# Patient Record
Sex: FEMALE | Race: OTHER | HISPANIC OR LATINO | Employment: UNEMPLOYED | URBAN - METROPOLITAN AREA
[De-identification: names, ages, dates, MRNs, and addresses within clinical notes are randomized per-mention and may not be internally consistent; named-entity substitution may affect disease eponyms.]

---

## 2018-04-17 ENCOUNTER — HOSPITAL ENCOUNTER (EMERGENCY)
Facility: HOSPITAL | Age: 23
Discharge: HOME | End: 2018-04-17

## 2018-04-17 VITALS
WEIGHT: 160 LBS | HEART RATE: 96 BPM | RESPIRATION RATE: 18 BRPM | DIASTOLIC BLOOD PRESSURE: 98 MMHG | SYSTOLIC BLOOD PRESSURE: 178 MMHG | OXYGEN SATURATION: 100 % | TEMPERATURE: 98.6 F

## 2018-04-17 DIAGNOSIS — H10.33 ACUTE BACTERIAL CONJUNCTIVITIS OF BOTH EYES: Primary | ICD-10-CM

## 2018-04-17 PROCEDURE — 99282 EMERGENCY DEPT VISIT SF MDM: CPT

## 2018-04-17 RX ORDER — ERYTHROMYCIN 5 MG/G
OINTMENT OPHTHALMIC EVERY 6 HOURS SCHEDULED
Qty: 3.5 G | Refills: 0 | Status: SHIPPED | OUTPATIENT
Start: 2018-04-17 | End: 2018-04-24

## 2018-04-17 NOTE — DISCHARGE INSTRUCTIONS
Conjuntivitis   LO QUE USTED DEBE SABER:   Conjuntivitis, también llamado madhuri myers, es inflamación de jones conjuntiva  La conjuntiva es courtney membrana delgada que cubre la parte anterior de jones madhuri y el interior de joslyn párpados  La conjuntiva ayuda a mantener jones madhuri protegido y húmedo  INSTRUCCIONES:   Medicamentos:   · Medicamento contra alergia:  Gem medicamento ayuda a disminuir Jade Spaniel o inflamación ocular causado por alergias  [employer] puede ser administrado en forma de Page, gotas oculares o aerosol de nariz  · Antibióticos:  Si la causa de jones conjuntivitis courtney bacteria, usted puede requerir antibióticos  Puede recibir [employer] forma de píldora, gotas oculares o pomada para el madhuri  · Medicamento de esteroide:  Gem medicamento ayuda con la disminución de inflamación  Puede ser administrado en forma de píldora, gotas oculares o aerosol de nariz  · [place] joslyn medicamentos daniela se le haya indicado  Llame a jones proveedor de angeles si piensa que jones medicamento no le está ayudando o tiene efectos secundarios  Infórmele si es alérgico a algún medicamento  Mantenga courtney lista de joslyn medicamentos, vitaminas, y hierbas que está tomando  Incluya la cantidad que manjeet, la Martín galicia, y por qué las manjeet  Traiga la lista o las botellas de las píldoras a joslyn visitas de seguimiento  Lleve siempre consigo courtney lista de joslyn medicamentos en [place] de emergencia  Programe courtney emmanuel con jones médico de cabecera daniela indicado:  Puede ser necesario regresar para más exámenes oculares  Estos ayudarán a jones médico de cabecera determinar si existe daño ocular  Anote joslyn preguntas para acordarse de hacerlas shannan joslyn visitas  Evite la propagación de conjuntivitis:   · Lave joslyn bartolo frecuentemente:  Lave joslyn bartolo antes de tocar joslyn ojos   También, lave joslyn bartolo antes de que prepare o coma alimentos y después que utilice el baño o cambie un pañal     · Evite alergenos:  Trate de evitar las cosas que provocan joslyn alergias, tales daniela las [place], el polvo y la césped  · Evite contacto:  No comparta toallas o paños para lavarse  Trate de alejarse de otras personas lo más posible  Pregunte cuándo puede regresar a la escuela o al [place]  · Disponga de los cosméticos oculares:  Disponga del rímel u otros cosméticos del madhuri  Manejar joslyn síntomas:  · Aplique courtney compresa fría: Moje un paño para lavarse con agua tibia y colócalo en jones madhuri  Fate ayudará a reducir el hinchazón  · Utilice gotas oculares:  Gotas oculares o lágrimas artificiales son disponibles sin [place] Plump  [place] para mantener el madhuri húmedo  · No utilice lentes de contacto:  Pueden irritar jones madhuri  Disponga de joslyn lentes actuales y pregunte cuándo puede volver a utilizarlos nuevamente  Cuando jones médico lo indica, utilice un par de [employer]  · Shelly Gilding jones madhuri:  Puede ser necesario que lave jones madhuri con salino para disminuir joslyn síntomas  Pida más información sobre cómo limpiar jones madhuri  Comuníquese con jones médico de cabecera si:  · Jones vista se vuelve nublosa  · Usted tiene pequeñas protuberancias o manchas de caryl en jones madhuri  · Usted tiene preguntas o inquietudes sobre jones condición o cuidado  Regrese a la rosie de emergencia si:  · Empeora el hinchazón en jones madhuri aún después de jones tratamiento  · Jones visión empeora repentinamente o no puede janie nada en absoluto  · Jones madhuri comienza a sangrar  © 2014 [address] is for End User's use only and may not be sold, redistributed or otherwise used for commercial purposes  All illustrations and images included in CareNotes® are the copyrighted property of A D A M , Inc  or Jean Peterson  Esta información es sólo para uso en educación  Jones intención no es darle un consejo médico sobre enfermedades o tratamientos   Colsulte con jones Flako Yandel farmacéutico antes de seguir cualquier régimen médico para saber si es seguro y [place] para usted

## 2018-04-17 NOTE — ED PROVIDER NOTES
History  Chief Complaint   Patient presents with    Eye Redness     Patient reports bilateral eye redness and draiange that began two days ago  28-year-old female who presents for evaluation of eye redness x2 days  Symptoms started in the left eye and have now began to develop in the right eye  She describes eye redness purulent drainage and crusting of her eyes  She has not attempted any alleviating factors  She is here with her daughter with similar symptoms  She wears glasses sometimes but no contacts  She denies any trauma or injury to her eye  She denies any blurred vision double vision or vision loss  She has no other complaints at this time  She denies any fevers or chills, eye swelling, chest pain, shortness breath, abdominal pain, vomiting, URI symptoms  None       History reviewed  No pertinent past medical history  History reviewed  No pertinent surgical history  History reviewed  No pertinent family history  I have reviewed and agree with the history as documented  Social History   Substance Use Topics    Smoking status: Never Smoker    Smokeless tobacco: Never Used    Alcohol use No        Review of Systems   Constitutional: Negative for chills and fever  HENT: Negative for congestion, facial swelling, rhinorrhea and sore throat  Eyes: Positive for discharge and redness  Negative for photophobia, pain, itching and visual disturbance  Respiratory: Negative for cough and shortness of breath  Cardiovascular: Negative for chest pain  Gastrointestinal: Negative for abdominal pain, nausea and vomiting  Neurological: Negative for dizziness, light-headedness and headaches         Physical Exam  ED Triage Vitals [04/17/18 1251]   Temperature Pulse Respirations Blood Pressure SpO2   98 6 °F (37 °C) 96 18 (!) 178/98 100 %      Temp Source Heart Rate Source Patient Position - Orthostatic VS BP Location FiO2 (%)   Oral Monitor Standing Right arm --      Pain Score       4           Orthostatic Vital Signs  Vitals:    04/17/18 1251   BP: (!) 178/98   Pulse: 96   Patient Position - Orthostatic VS: Standing       Physical Exam   Constitutional: She is oriented to person, place, and time  She appears well-developed and well-nourished  Non-toxic appearance  She does not have a sickly appearance  She does not appear ill  No distress  Well appearing   HENT:   Head: Normocephalic and atraumatic  Right Ear: Hearing, tympanic membrane, external ear and ear canal normal    Left Ear: Hearing, tympanic membrane, external ear and ear canal normal    Nose: Nose normal  No mucosal edema or rhinorrhea  Mouth/Throat: Uvula is midline, oropharynx is clear and moist and mucous membranes are normal  No tonsillar exudate  Eyes: EOM and lids are normal  Pupils are equal, round, and reactive to light  Lids are everted and swept, no foreign bodies found  Right eye exhibits no chemosis, no discharge, no exudate and no hordeolum  No foreign body present in the right eye  Left eye exhibits discharge and exudate  Left eye exhibits no chemosis and no hordeolum  No foreign body present in the left eye  Right conjunctiva is injected  Left conjunctiva is injected  No scleral icterus  Right eye exhibits normal extraocular motion  Left eye exhibits normal extraocular motion  Bilateral conjunctival injection in the periphery, left greater than right  There is purulent drainage noted in the left eye  EOMs do not pain, no nystagmus  There is no periorbital erythema or edema  Neck: Normal range of motion  Neck supple  Cardiovascular: Normal rate, regular rhythm and normal heart sounds  Exam reveals no gallop and no friction rub  No murmur heard  Pulmonary/Chest: Effort normal and breath sounds normal  No respiratory distress  She has no decreased breath sounds  She has no wheezes  She has no rhonchi  She has no rales  Musculoskeletal: Normal range of motion     Neurological: She is alert and oriented to person, place, and time  Skin: Skin is warm and dry  Capillary refill takes less than 2 seconds  She is not diaphoretic  Psychiatric: She has a normal mood and affect  Nursing note and vitals reviewed  ED Medications  Medications - No data to display    Diagnostic Studies  Results Reviewed     None                 No orders to display              Procedures  Procedures       Phone Contacts  ED Phone Contact    ED Course  ED Course                                MDM  Number of Diagnoses or Management Options  Acute bacterial conjunctivitis of both eyes: new and does not require workup  Diagnosis management comments:   42-year-old female presents for evaluation of eye redness  Symptoms started in the left eye and spread to the right  She has no visual complaints or URI symptoms  Her daughter is also being evaluated for the same symptoms  She does not wear contacts  Will treat for conjunctivitis with erythromycin ointment  Patient instructed to follow up with [facility] and eye doctor if symptoms worsen  Return to ED precautions were given  Patient and family verbalized understanding and agreed with plan  Patient Progress  Patient progress: stable    CritCare Time    Disposition  Final diagnoses:   Acute bacterial conjunctivitis of both eyes     Time reflects when diagnosis was documented in both MDM as applicable and the Disposition within this note     Time User Action Codes Description Comment    4/17/2018  2:16 PM Stanton Cruz Add [H10 33] Acute bacterial conjunctivitis of both eyes       ED Disposition     ED Disposition Condition Comment    Discharge  Vandana Boyce discharge to home/self care      Condition at discharge: Good        Follow-up Information     Follow up With Specialties Details Why Contact Info Additional Colten Byers [facility] Schedule an appointment as soon as possible for a visit  [address] [facility]  [place] [address]  [address] [facility] Schedule an appointment as soon as possible for a visit If symptoms worsen [address] [facility]  [phone]       [facility] Emergency Department Emergency Medicine  If symptoms worsen [address] [facility] ED, [address] [facility] [place], [place], [address]        Discharge Medication List as of 4/17/2018  2:17 PM      START taking these medications    Details   erythromycin (ILOTYCIN) ophthalmic ointment Administer to both eyes every 6 (six) hours for 7 days Place a 1/2 inch ribbon of ointment into the lower eyelid  , Starting Tue 4/17/2018, Until Tue 4/24/2018, Print           No discharge procedures on file      ED Provider  Electronically Signed by           Herminio Sheehan PA-C  04/17/18 7336

## 2018-07-02 ENCOUNTER — APPOINTMENT (EMERGENCY)
Dept: ULTRASOUND IMAGING | Facility: HOSPITAL | Age: 23
End: 2018-07-02
Payer: COMMERCIAL

## 2018-07-02 ENCOUNTER — HOSPITAL ENCOUNTER (EMERGENCY)
Facility: HOSPITAL | Age: 23
Discharge: HOME | End: 2018-07-02
Attending: EMERGENCY MEDICINE | Admitting: EMERGENCY MEDICINE
Payer: COMMERCIAL

## 2018-07-02 VITALS
SYSTOLIC BLOOD PRESSURE: 112 MMHG | TEMPERATURE: 98 F | OXYGEN SATURATION: 100 % | DIASTOLIC BLOOD PRESSURE: 61 MMHG | HEART RATE: 68 BPM | RESPIRATION RATE: 16 BRPM | WEIGHT: 182.1 LBS

## 2018-07-02 DIAGNOSIS — O03.4 INCOMPLETE ABORTION: ICD-10-CM

## 2018-07-02 DIAGNOSIS — O46.90 VAGINAL BLEEDING IN PREGNANCY: Primary | ICD-10-CM

## 2018-07-02 LAB
ABO GROUP BLD: NORMAL
ALBUMIN SERPL BCP-MCNC: 3.9 G/DL (ref 3.5–5)
ALP SERPL-CCNC: 64 U/L (ref 46–116)
ALT SERPL W P-5'-P-CCNC: 18 U/L (ref 12–78)
AST SERPL W P-5'-P-CCNC: 14 U/L (ref 5–45)
ATRIAL RATE: 70 BPM
B-HCG SERPL-ACNC: 3218 MIU/ML
BASOPHILS # BLD AUTO: 0.03 THOUSANDS/ΜL (ref 0–0.1)
BASOPHILS NFR BLD AUTO: 0 % (ref 0–1)
BILIRUB DIRECT SERPL-MCNC: 0.1 MG/DL (ref 0–0.2)
BILIRUB SERPL-MCNC: 0.32 MG/DL (ref 0.2–1)
BLD GP AB SCN SERPL QL: NEGATIVE
EOSINOPHIL # BLD AUTO: 0.04 THOUSAND/ΜL (ref 0–0.61)
EOSINOPHIL NFR BLD AUTO: 0 % (ref 0–6)
ERYTHROCYTE [DISTWIDTH] IN BLOOD BY AUTOMATED COUNT: 13.8 % (ref 11.6–15.1)
GLUCOSE SERPL-MCNC: 160 MG/DL (ref 65–140)
HCT VFR BLD AUTO: 28.6 % (ref 34.8–46.1)
HCT VFR BLD AUTO: 35.6 % (ref 34.8–46.1)
HGB BLD-MCNC: 11.9 G/DL (ref 11.5–15.4)
HGB BLD-MCNC: 9.4 G/DL (ref 11.5–15.4)
LYMPHOCYTES # BLD AUTO: 2.27 THOUSANDS/ΜL (ref 0.6–4.47)
LYMPHOCYTES NFR BLD AUTO: 17 % (ref 14–44)
MCH RBC QN AUTO: 29.2 PG (ref 26.8–34.3)
MCHC RBC AUTO-ENTMCNC: 33.4 G/DL (ref 31.4–37.4)
MCV RBC AUTO: 87 FL (ref 82–98)
MONOCYTES # BLD AUTO: 0.5 THOUSAND/ΜL (ref 0.17–1.22)
MONOCYTES NFR BLD AUTO: 4 % (ref 4–12)
NEUTROPHILS # BLD AUTO: 10.5 THOUSANDS/ΜL (ref 1.85–7.62)
NEUTS SEG NFR BLD AUTO: 79 % (ref 43–75)
NRBC BLD AUTO-RTO: 0 /100 WBCS
P AXIS: 47 DEGREES
PLATELET # BLD AUTO: 292 THOUSANDS/UL (ref 149–390)
PMV BLD AUTO: 10.1 FL (ref 8.9–12.7)
PR INTERVAL: 146 MS
PROT SERPL-MCNC: 7.9 G/DL (ref 6.4–8.2)
QRS AXIS: 73 DEGREES
QRSD INTERVAL: 94 MS
QT INTERVAL: 380 MS
QTC INTERVAL: 410 MS
RBC # BLD AUTO: 4.08 MILLION/UL (ref 3.81–5.12)
RH BLD: POSITIVE
SPECIMEN EXPIRATION DATE: NORMAL
T WAVE AXIS: 44 DEGREES
VENTRICULAR RATE: 70 BPM
WBC # BLD AUTO: 13.34 THOUSAND/UL (ref 4.31–10.16)

## 2018-07-02 PROCEDURE — 96374 THER/PROPH/DIAG INJ IV PUSH: CPT

## 2018-07-02 PROCEDURE — 85014 HEMATOCRIT: CPT | Performed by: EMERGENCY MEDICINE

## 2018-07-02 PROCEDURE — 36415 COLL VENOUS BLD VENIPUNCTURE: CPT | Performed by: EMERGENCY MEDICINE

## 2018-07-02 PROCEDURE — 76815 OB US LIMITED FETUS(S): CPT

## 2018-07-02 PROCEDURE — 86901 BLOOD TYPING SEROLOGIC RH(D): CPT | Performed by: EMERGENCY MEDICINE

## 2018-07-02 PROCEDURE — 82948 REAGENT STRIP/BLOOD GLUCOSE: CPT

## 2018-07-02 PROCEDURE — 86900 BLOOD TYPING SEROLOGIC ABO: CPT | Performed by: EMERGENCY MEDICINE

## 2018-07-02 PROCEDURE — 80076 HEPATIC FUNCTION PANEL: CPT | Performed by: EMERGENCY MEDICINE

## 2018-07-02 PROCEDURE — 85025 COMPLETE CBC W/AUTO DIFF WBC: CPT | Performed by: EMERGENCY MEDICINE

## 2018-07-02 PROCEDURE — 93005 ELECTROCARDIOGRAM TRACING: CPT

## 2018-07-02 PROCEDURE — 86850 RBC ANTIBODY SCREEN: CPT | Performed by: EMERGENCY MEDICINE

## 2018-07-02 PROCEDURE — 84702 CHORIONIC GONADOTROPIN TEST: CPT | Performed by: EMERGENCY MEDICINE

## 2018-07-02 PROCEDURE — 99285 EMERGENCY DEPT VISIT HI MDM: CPT

## 2018-07-02 PROCEDURE — 96361 HYDRATE IV INFUSION ADD-ON: CPT

## 2018-07-02 PROCEDURE — 93010 ELECTROCARDIOGRAM REPORT: CPT | Performed by: INTERNAL MEDICINE

## 2018-07-02 PROCEDURE — 85018 HEMOGLOBIN: CPT | Performed by: EMERGENCY MEDICINE

## 2018-07-02 PROCEDURE — 96375 TX/PRO/DX INJ NEW DRUG ADDON: CPT

## 2018-07-02 RX ORDER — MISOPROSTOL 200 UG/1
800 TABLET ORAL ONCE
Status: COMPLETED | OUTPATIENT
Start: 2018-07-02 | End: 2018-07-02

## 2018-07-02 RX ORDER — IBUPROFEN 600 MG/1
600 TABLET ORAL EVERY 6 HOURS PRN
Qty: 30 TABLET | Refills: 0 | Status: SHIPPED | OUTPATIENT
Start: 2018-07-02 | End: 2019-01-02 | Stop reason: ALTCHOICE

## 2018-07-02 RX ORDER — FENTANYL CITRATE 50 UG/ML
50 INJECTION, SOLUTION INTRAMUSCULAR; INTRAVENOUS ONCE
Status: COMPLETED | OUTPATIENT
Start: 2018-07-02 | End: 2018-07-02

## 2018-07-02 RX ORDER — ONDANSETRON 2 MG/ML
4 INJECTION INTRAMUSCULAR; INTRAVENOUS ONCE
Status: COMPLETED | OUTPATIENT
Start: 2018-07-02 | End: 2018-07-02

## 2018-07-02 RX ADMIN — SODIUM CHLORIDE 1000 ML: 0.9 INJECTION, SOLUTION INTRAVENOUS at 01:24

## 2018-07-02 RX ADMIN — MISOPROSTOL 800 MCG: 200 TABLET ORAL at 04:19

## 2018-07-02 RX ADMIN — FENTANYL CITRATE 50 MCG: 50 INJECTION, SOLUTION INTRAMUSCULAR; INTRAVENOUS at 01:45

## 2018-07-02 RX ADMIN — ONDANSETRON 4 MG: 2 INJECTION INTRAMUSCULAR; INTRAVENOUS at 01:45

## 2018-07-02 NOTE — ED PROVIDER NOTES
History  Chief Complaint   Patient presents with    Vaginal Bleeding     Pt reports she has not had period for 2 months; No home HCG test done; Pt began bleeding tonight and reports large amount of blood; Pt's spouse reports Pt had 5 syncopal episodes tonight; Pt c/o generalized weakness    Weakness - Generalized     30 yo female who missed MP for past 2 weeks who presents after 5 syncopal episodes with vaginal bleeding  Per  and cousin pt started spotting yesterday and bleeding heavily a few hours PTA with large clots  Was in bathroom with  bleeding briskly for 1 hour before they brought her in for eval   Pt very pale and weak  Unable to stand to give urine sample  History provided by:  Patient, spouse and relative   used: No    Vaginal Bleeding   Quality:  Clots, dark red and heavier than menses  Severity:  Severe  Onset quality:  Gradual  Duration:  2 days  Timing:  Constant  Progression:  Worsening  Chronicity:  New  Menstrual history:  Missed period  Possible pregnancy: yes    Context: spontaneously    Relieved by:  Nothing  Worsened by:  Nothing  Ineffective treatments:  None tried  Associated symptoms: abdominal pain (suprapubic cramping), dizziness, fatigue (severe) and nausea    Associated symptoms: no back pain, no dysuria, no fever and no vaginal discharge    Associated symptoms comment:  Syncope x 5      None       History reviewed  No pertinent past medical history  History reviewed  No pertinent surgical history  History reviewed  No pertinent family history  I have reviewed and agree with the history as documented  Social History   Substance Use Topics    Smoking status: Never Smoker    Smokeless tobacco: Never Used    Alcohol use No        Review of Systems   Constitutional: Positive for fatigue (severe)  Negative for appetite change, chills and fever  HENT: Negative for congestion, sore throat and trouble swallowing      Eyes: Negative for visual disturbance  Respiratory: Negative for apnea and shortness of breath  Cardiovascular: Negative for chest pain  Gastrointestinal: Positive for abdominal pain (suprapubic cramping) and nausea  Negative for diarrhea and vomiting  Genitourinary: Positive for menstrual problem (missed x 2 mo) and vaginal bleeding  Negative for dysuria, frequency and vaginal discharge  Musculoskeletal: Negative for back pain, neck pain and neck stiffness  Skin: Negative for pallor and rash  Allergic/Immunologic: Negative for immunocompromised state  Neurological: Positive for dizziness, syncope and weakness  Negative for light-headedness and headaches  Psychiatric/Behavioral: Negative for confusion  All other systems reviewed and are negative  Physical Exam  Physical Exam   Constitutional: She is oriented to person, place, and time  She appears well-developed and well-nourished  She appears distressed (very weak)  HENT:   Head: Normocephalic and atraumatic  Mouth/Throat: Oropharynx is clear and moist    Eyes: EOM are normal  Pupils are equal, round, and reactive to light  Neck: Normal range of motion  Neck supple  Cardiovascular: Normal rate and regular rhythm  No murmur heard  Pulmonary/Chest: Effort normal and breath sounds normal  No respiratory distress  Abdominal: Soft  Bowel sounds are normal  There is tenderness (moderate RLQ tenderness and mild suprapubic tenderness )  Genitourinary:   Genitourinary Comments: Brisk bleeding with large clots, os not visualized   Musculoskeletal: Normal range of motion  Neurological: She is alert and oriented to person, place, and time  Generalized fatigue/weakness   Skin: Skin is warm  Capillary refill takes less than 2 seconds  No rash noted  There is pallor  Nursing note and vitals reviewed        Vital Signs  ED Triage Vitals   Temperature Pulse Respirations Blood Pressure SpO2   07/02/18 0114 07/02/18 0114 07/02/18 0114 07/02/18 0114 07/02/18 0114   98 °F (36 7 °C) 68 16 118/62 100 %      Temp Source Heart Rate Source Patient Position - Orthostatic VS BP Location FiO2 (%)   07/02/18 0114 07/02/18 0114 07/02/18 0130 07/02/18 0130 --   Oral Monitor Lying Left arm       Pain Score       07/02/18 0114       No Pain           Vitals:    07/02/18 0230 07/02/18 0245 07/02/18 0300 07/02/18 0433   BP: 114/65 107/56 105/58 112/61   Pulse: 72 64 64 68   Patient Position - Orthostatic VS: Lying Lying         Visual Acuity  Visual Acuity      Most Recent Value   L Pupil Size (mm)  3   R Pupil Size (mm)  3          ED Medications  Medications   sodium chloride 0 9 % bolus 1,000 mL (0 mL Intravenous Stopped 7/2/18 0218)   fentanyl citrate (PF) 100 MCG/2ML 50 mcg (50 mcg Intravenous Given 7/2/18 0145)   ondansetron (ZOFRAN) injection 4 mg (4 mg Intravenous Given 7/2/18 0145)   misoprostol (CYTOTEC) tablet 800 mcg (800 mcg Oral Given by Other 7/2/18 0419)       Diagnostic Studies  Results Reviewed     Procedure Component Value Units Date/Time    Hemoglobin and hematocrit, blood [[id number]]  (Abnormal) Collected:  07/02/18 0313    Lab Status:  Final result Specimen:  Blood from Arm, Right Updated:  07/02/18 0319     Hemoglobin 9 4 (L) g/dL      Hematocrit 28 6 (L) %     Quantitative hCG [[id number]]  (Abnormal) Collected:  07/02/18 0124    Lab Status:  Final result Specimen:  Blood from Arm, Right Updated:  07/02/18 0216     HCG, Quant 3,218 0 (H) mIU/mL     Narrative:          Expected Ranges:     Approximate               Approximate HCG  Gestation age          Concentration ( mIU/mL)  _____________          ______________________   Edwena Nabeel                      HCG values  0 2-1                       5-50  1-2                           2-3                         100-5000  3-4                         500-46686  4-5                         1000-48003  5-6                         [id number]  6-8                         10605-536300  8-12 [id number]    Hepatic function panel [[id number]]  (Normal) Collected:  18    Lab Status:  Final result Specimen:  Blood from Arm, Right Updated:  18 0216     Total Bilirubin 0 32 mg/dL      Bilirubin, Direct 0 10 mg/dL      Alkaline Phosphatase 64 U/L      AST 14 U/L      ALT 18 U/L      Total Protein 7 9 g/dL      Albumin 3 9 g/dL     CBC and differential [[id number]]  (Abnormal) Collected:  18    Lab Status:  Final result Specimen:  Blood from Arm, Right Updated:  18 013     WBC 13 34 (H) Thousand/uL      RBC 4 08 Million/uL      Hemoglobin 11 9 g/dL      Hematocrit 35 6 %      MCV 87 fL      MCH 29 2 pg      MCHC 33 4 g/dL      RDW 13 8 %      MPV 10 1 fL      Platelets 473 Thousands/uL      nRBC 0 /100 WBCs      Neutrophils Relative 79 (H) %      Lymphocytes Relative 17 %      Monocytes Relative 4 %      Eosinophils Relative 0 %      Basophils Relative 0 %      Neutrophils Absolute 10 50 (H) Thousands/µL      Lymphocytes Absolute 2 27 Thousands/µL      Monocytes Absolute 0 50 Thousand/µL      Eosinophils Absolute 0 04 Thousand/µL      Basophils Absolute 0 03 Thousands/µL     POCT urinalysis dipstick [[id number]]     Lab Status:  No result Specimen:  Urine     POCT pregnancy, urine [[id number]]     Lab Status:  No result     Fingerstick Glucose (POCT) [[id number]]  (Abnormal) Collected:  18 011    Lab Status:  Final result Updated:  18     POC Glucose 160 (H) mg/dl                  US OB pregnancy limited with transvaginal   Final Result by Asiya Hodges MD ([id number])      No IUP  Ectopic pregnancy cannot be excluded  Recommend serial serum beta hCG and/or ultrasound for further evaluation  Based on the clinical history and the appearance of the endometrium which is heterogeneous and extends into the lower uterine segment, I suspect this patient has a missed / in progress               I personally discussed this study with Shaw Branch RN on 7/2/2018 at 3:56 AM                Workstation performed: [id number]                    Procedures  ECG 12 Lead Documentation  Date/Time: 7/2/2018 1:22 AM  Performed by: Jigar Mathur  Authorized by: Jigar Mathur     Indications / Diagnosis:  Syncope  Patient location:  ED  Previous ECG:     Previous ECG:  Unavailable  Interpretation:     Interpretation: normal    Rate:     ECG rate:  70    ECG rate assessment: normal    Rhythm:     Rhythm: sinus rhythm    Ectopy:     Ectopy: none    QRS:     QRS axis:  Normal    QRS intervals:  Normal  Conduction:     Conduction: normal    ST segments:     ST segments:  Normal  T waves:     T waves: normal             Phone Contacts  ED Phone Contact    ED Course  ED Course as of Jul 02 0506   Mon Jul 02, 2018   0113 Pt seen and examined  30 yo female who missed MP for past 2 weeks who presents after 5 syncopal episodes with vaginal bleeding  Per  and cousin pt started spotting yesterday and bleeding heavily a few hours PTA with large clots  Was in bathroom with  bleeding briskly for 1 hour before they brought her in for eval   Pt very pale and weak  Unable to stand to give urine sample  Will check labs including T&S and beta HCG  Will give IVF and check pelvic exam     0138 Pelvic exam performed with Krystal (MELODY) at bedside - large amount of clots with brisk bleeding, os unable to be visualized  Hgb 11 9  IVF, fentanyl and zofran ordered  Will d/w OBGYN      5736 OBGYN paged as per lab quant is being autodiluted and expected to be "very high"  5700 [facility] coming for eval   B HCG 3218     0228 OBGYN down seeing pt - agrees syncope likely from vasovagal response  US r/o ectopic ordered - [employer] calling them in  Will repeat H/H at 2 hour jennifer ~ 0330     0239 OBGYN feels pt likely needs D&E - os open and passing placental tissue        0410 US - No IUP   Ectopic pregnancy cannot be excluded   Recommend serial serum beta hCG and/or ultrasound for further evaluation  Based on the clinical history and the appearance of the endometrium which is heterogeneous and extends into the lower uterine segment, I suspect this patient has a missed / in progress  Repeat Hgb 9 4 - OBGYN aware and down to see pt, thinking best option would be cytotec  MDM  CritCare Time    Disposition  Final diagnoses:   Vaginal bleeding in pregnancy   Incomplete      Time reflects when diagnosis was documented in both MDM as applicable and the Disposition within this note     Time User Action Codes Description Comment    2018  2:20 AM Mercy SCHMITT Add [O46 90] Vaginal bleeding in pregnancy     2018  4:32 AM Socrates Reynolds Add [O03 4] Incomplete        ED Disposition     ED Disposition Condition Comment    Discharge  [facility] discharge to home/self care  Condition at discharge: Good        Follow-up Information     Follow up With Specialties Details Why [facility] Obstetrics and Gynecology Call Please call to schedule follow-up appointment  Brandon Rouse [address]  [phone]          Discharge Medication List as of 2018  4:25 AM      START taking these medications    Details   ibuprofen (MOTRIN) 600 mg tablet Take 1 tablet (600 mg total) by mouth every 6 (six) hours as needed for mild pain or moderate pain, Starting Mon 2018, Print             Outpatient Discharge Orders  hCG, quantitative   Standing Status: Future  Standing Exp   Date: 18         ED Provider  Electronically Signed by           Timoteo Perez DO  18 [id number]

## 2018-07-02 NOTE — ED NOTES
Provider at bedside requesting pelvic setup at this time due to family reports of "alot" of heavy bleeding vaginally        Herbert Gamble RN  07/02/18 5001

## 2018-07-02 NOTE — CONSULTS
Consultation - Gynecology   Sam Reynolds 29 y o  female MRN: [id number]  Unit/Bed#: ED 30 Encounter: [id number]    Chief Complaint   Patient presents with    Vaginal Bleeding     Pt reports she has not had period for 2 months; No home HCG test done; Pt began bleeding tonight and reports large amount of blood; Pt's spouse reports Pt had 5 syncopal episodes tonight; Pt c/o generalized weakness    Weakness - Generalized       History of Present Illness   Physician Requesting Consult: Johan Infante DO  Reason for Consult / Principal Problem: Vaginal Bleeding  Subspeciality: General GYN  HPI: Sam Reynolds is a 29y o  year old female who presents with vaginal bleeding     29 y o  Carmenza Marion, LMP unknown believes in to be in early April, using condoms intermittently for contraception, presents for evaluation of vaginal bleeding  Reports bleeding onset yesterday with spotting, progressing to heavy vaginal bleeding throughout the evening, passing large clots  Per  and cousins reports syncopal episodes when passing clots prior to ED presentation  Also reporting associated cramping and nausea  At time of initial ED presentation, patient did not know she was pregnancy, not completing a home pregnancy test in the setting of missed menstrual periods  Reports regular menses prior to 2018  Hx previous   Inpatient consult to Obstetrics / Gynecology  Consult performed by: Emanuel Sims ordered by: Lashawn Bronson          Review of Systems   Constitutional: Positive for chills and fatigue  Negative for fever  Respiratory: Negative for chest tightness and shortness of breath  Cardiovascular: Negative for chest pain, palpitations and leg swelling  Gastrointestinal: Positive for abdominal pain and nausea  Negative for constipation, diarrhea and vomiting  Genitourinary: Positive for menstrual problem and vaginal bleeding   Negative for dysuria, hematuria, vaginal discharge and vaginal pain  Musculoskeletal: Negative for arthralgias and myalgias  Neurological: Positive for weakness and light-headedness  Negative for dizziness  Historical Information   History reviewed  No pertinent past medical history  History reviewed  No pertinent surgical history  OB History   No data available     History reviewed  No pertinent family history  Social History   History   Alcohol Use No     History   Drug Use No     History   Smoking Status    Never Smoker   Smokeless Tobacco    Never Used       Meds/Allergies   Current Facility-Administered Medications   Medication Dose Route Frequency    misoprostol (CYTOTEC) tablet 800 mcg  800 mcg Oral Once         No Known Allergies    Objective   Vitals: Blood pressure 105/58, pulse 64, temperature 98 °F (36 7 °C), temperature source Oral, resp  rate 14, weight 82 6 kg (182 lb 1 6 oz), last menstrual period 07/01/2018, SpO2 100 %  There is no height or weight on file to calculate BMI  Intake/Output Summary (Last 24 hours) at 07/02/18 0411  Last data filed at 07/02/18 0218   Gross per 24 hour   Intake             1000 ml   Output                0 ml   Net             1000 ml       Invasive Devices     Peripheral Intravenous Line            Peripheral IV 07/02/18 Right Antecubital less than 1 day                Physical Exam   Constitutional: She is oriented to person, place, and time  She appears well-developed and well-nourished  No distress  Cardiovascular: Normal rate, regular rhythm and normal heart sounds  Pulmonary/Chest: Effort normal and breath sounds normal    Abdominal: Soft  Bowel sounds are normal  There is tenderness  There is no rebound and no guarding  Diffuse lower abdominal tenderness, mild    Genitourinary: There is no rash, tenderness or lesion on the right labia  There is no rash, tenderness or lesion on the left labia  There is tenderness and bleeding in the vagina  No foreign body in the vagina   No signs of injury around the vagina  No vaginal discharge found  Genitourinary Comments: Moderate vaginal bleeding + clots, approximately 100 cc evacuated during speculum exam; cervical os visually open passing products of conception    Neurological: She is alert and oriented to person, place, and time  Lab Results:   Admission on 07/02/2018   Component Date Value    POC Glucose 07/02/2018 160*    HCG, Quant 07/02/2018 3218 0*    Total Bilirubin 07/02/2018 0 32     Bilirubin, Direct 07/02/2018 0 10     Alkaline Phosphatase 07/02/2018 64     AST 07/02/2018 14     ALT 07/02/2018 18     Total Protein 07/02/2018 7 9     Albumin 07/02/2018 3 9     WBC 07/02/2018 13 34*    RBC 07/02/2018 4 08     Hemoglobin 07/02/2018 11 9     Hematocrit 07/02/2018 35 6     MCV 07/02/2018 87     MCH 07/02/2018 29 2     MCHC 07/02/2018 33 4     RDW 07/02/2018 13 8     MPV 07/02/2018 10 1     Platelets 86/49/1266 292     nRBC 07/02/2018 0     Neutrophils Relative 07/02/2018 79*    Lymphocytes Relative 07/02/2018 17     Monocytes Relative 07/02/2018 4     Eosinophils Relative 07/02/2018 0     Basophils Relative 07/02/2018 0     Neutrophils Absolute 07/02/2018 10 50*    Lymphocytes Absolute 07/02/2018 2 27     Monocytes Absolute 07/02/2018 0 50     Eosinophils Absolute 07/02/2018 0 04     Basophils Absolute 07/02/2018 0 03     Hemoglobin 07/02/2018 9 4*    Hematocrit 07/02/2018 28 6*    ABO Grouping 07/02/2018 A     Rh Factor 07/02/2018 Positive     Antibody Screen 07/02/2018 Negative     Specimen Expiration Date 07/02/2018 [id number]      Imaging Studies: I have personally reviewed pertinent reports  EKG, Pathology, and Other Studies: I have personally reviewed pertinent reports  PELVIC ULTRASOUND     INDICATION: RLQ abd pain, vag bleeding r/o ectopic      COMPARISON: None      TECHNIQUE:   Transabdominal ultrasound of the pelvis was performed    Additional transvaginal imaging was then performed to better assess myometrial/endometrial architecture and ovarian parenchymal detail  The study includes volumetric sweeps and   traditional still imaging technique        FINDINGS:     No IUP  Ectopic pregnancy cannot be excluded  Recommend serial serum beta hCG and/or ultrasound for further evaluation      UTERUS/ADNEXA:   The uterus and ovaries are within normal limits  Endometrial stripe measures 13 mm and is heterogeneous extending into the lower uterine segment  The cervix remains closed  No free fluid present      IMPRESSION:     No IUP  Ectopic pregnancy cannot be excluded  Recommend serial serum beta hCG and/or ultrasound for further evaluation      Based on the clinical history and the appearance of the endometrium which is heterogeneous and extends into the lower uterine segment, I suspect this patient has a missed / in progress  Assessment/Plan     Assessment:  29year old now  with incomplete , hemodynamically stable  Plan:  Counseled patient on current presentation of miscarriage/incomplete  in the setting of elevated bHCG and brisk vaginal bleeding  Discussed appropriate fall in hemoglobin, given current symptoms and clinical presentation no current need for blood transfusion  Discussed management options regarding incomplete , including expected management vs  Medical management vs  Surgical management  Patient opts for medical management  Counseled patient on continue cramping and bleeding with use of cytotec  Cytotec 800 mcg placed vaginally prior to discharge  Rx motrin 600 mg PO q6h for cramping also provided prior to discharge  Encouraged follow-up at the [facility] in 1 week, along with repeat BHCG for one week  To follow-up in the ED with worsening vaginal bleeding, fevers, chills  Patient agreeable with plan         Code Status: No Order  Advance Directive and Living Will:      Power of : POLST:      Counseling / Coordination of Care  Total floor / unit time spent today30 minutes  minutes  Greater than 50% of total time was spent with the patient and / or family counseling and / or coordination of care      DO Elda

## 2018-07-02 NOTE — ED NOTES
Patient resting in bed comfortably refusing to get into gown  Patient responds to name but not opening eyes   Patient asked why her eyes are closed and reports "Im tired "     Marianne Dwyer RN  07/02/18 0545

## 2019-01-02 ENCOUNTER — HOSPITAL ENCOUNTER (EMERGENCY)
Facility: HOSPITAL | Age: 24
Discharge: HOME | End: 2019-01-02
Attending: EMERGENCY MEDICINE
Payer: COMMERCIAL

## 2019-01-02 VITALS
HEART RATE: 71 BPM | OXYGEN SATURATION: 100 % | SYSTOLIC BLOOD PRESSURE: 113 MMHG | TEMPERATURE: 98.4 F | DIASTOLIC BLOOD PRESSURE: 69 MMHG | RESPIRATION RATE: 18 BRPM

## 2019-01-02 DIAGNOSIS — D64.9 ANEMIA: ICD-10-CM

## 2019-01-02 DIAGNOSIS — Z3A.14 14 WEEKS GESTATION OF PREGNANCY: ICD-10-CM

## 2019-01-02 DIAGNOSIS — O20.0 THREATENED ABORTION: Primary | ICD-10-CM

## 2019-01-02 DIAGNOSIS — N93.9 VAGINAL BLEEDING: ICD-10-CM

## 2019-01-02 LAB
ALBUMIN SERPL BCP-MCNC: 2.7 G/DL (ref 3.5–5)
ALP SERPL-CCNC: 41 U/L (ref 46–116)
ALT SERPL W P-5'-P-CCNC: 11 U/L (ref 12–78)
ANION GAP SERPL CALCULATED.3IONS-SCNC: 8 MMOL/L (ref 4–13)
AST SERPL W P-5'-P-CCNC: 12 U/L (ref 5–45)
B-HCG SERPL-ACNC: ABNORMAL MIU/ML
BACTERIA UR QL AUTO: ABNORMAL /HPF
BASOPHILS # BLD AUTO: 0.03 THOUSANDS/ΜL (ref 0–0.1)
BASOPHILS NFR BLD AUTO: 0 % (ref 0–1)
BILIRUB SERPL-MCNC: 0.15 MG/DL (ref 0.2–1)
BILIRUB UR QL STRIP: NEGATIVE
BUN SERPL-MCNC: 11 MG/DL (ref 5–25)
CALCIUM SERPL-MCNC: 7.8 MG/DL (ref 8.3–10.1)
CHLORIDE SERPL-SCNC: 104 MMOL/L (ref 100–108)
CLARITY UR: CLEAR
CO2 SERPL-SCNC: 26 MMOL/L (ref 21–32)
COLOR UR: YELLOW
CREAT SERPL-MCNC: 0.92 MG/DL (ref 0.6–1.3)
EOSINOPHIL # BLD AUTO: 0.09 THOUSAND/ΜL (ref 0–0.61)
EOSINOPHIL NFR BLD AUTO: 1 % (ref 0–6)
ERYTHROCYTE [DISTWIDTH] IN BLOOD BY AUTOMATED COUNT: 16.7 % (ref 11.6–15.1)
EXT PREG TEST URINE: ABNORMAL
GFR SERPL CREATININE-BSD FRML MDRD: 89 ML/MIN/1.73SQ M
GLUCOSE SERPL-MCNC: 87 MG/DL (ref 65–140)
GLUCOSE UR STRIP-MCNC: NEGATIVE MG/DL
HCT VFR BLD AUTO: 31.2 % (ref 34.8–46.1)
HGB BLD-MCNC: 9.6 G/DL (ref 11.5–15.4)
HGB UR QL STRIP.AUTO: ABNORMAL
IMM GRANULOCYTES # BLD AUTO: 0.03 THOUSAND/UL (ref 0–0.2)
IMM GRANULOCYTES NFR BLD AUTO: 0 % (ref 0–2)
KETONES UR STRIP-MCNC: NEGATIVE MG/DL
LEUKOCYTE ESTERASE UR QL STRIP: ABNORMAL
LYMPHOCYTES # BLD AUTO: 2.07 THOUSANDS/ΜL (ref 0.6–4.47)
LYMPHOCYTES NFR BLD AUTO: 24 % (ref 14–44)
MCH RBC QN AUTO: 24.7 PG (ref 26.8–34.3)
MCHC RBC AUTO-ENTMCNC: 30.8 G/DL (ref 31.4–37.4)
MCV RBC AUTO: 80 FL (ref 82–98)
MONOCYTES # BLD AUTO: 0.56 THOUSAND/ΜL (ref 0.17–1.22)
MONOCYTES NFR BLD AUTO: 6 % (ref 4–12)
NEUTROPHILS # BLD AUTO: 5.96 THOUSANDS/ΜL (ref 1.85–7.62)
NEUTS SEG NFR BLD AUTO: 69 % (ref 43–75)
NITRITE UR QL STRIP: NEGATIVE
NON-SQ EPI CELLS URNS QL MICRO: ABNORMAL /HPF
NRBC BLD AUTO-RTO: 0 /100 WBCS
PH UR STRIP.AUTO: 7 [PH] (ref 4.5–8)
PLATELET # BLD AUTO: 293 THOUSANDS/UL (ref 149–390)
PMV BLD AUTO: 10.1 FL (ref 8.9–12.7)
POTASSIUM SERPL-SCNC: 3.6 MMOL/L (ref 3.5–5.3)
PROT SERPL-MCNC: 7 G/DL (ref 6.4–8.2)
PROT UR STRIP-MCNC: NEGATIVE MG/DL
RBC # BLD AUTO: 3.88 MILLION/UL (ref 3.81–5.12)
RBC #/AREA URNS AUTO: ABNORMAL /HPF
SODIUM SERPL-SCNC: 138 MMOL/L (ref 136–145)
SP GR UR STRIP.AUTO: 1.02 (ref 1–1.03)
UROBILINOGEN UR QL STRIP.AUTO: 0.2 E.U./DL
WBC # BLD AUTO: 8.74 THOUSAND/UL (ref 4.31–10.16)
WBC #/AREA URNS AUTO: ABNORMAL /HPF

## 2019-01-02 PROCEDURE — 87491 CHLMYD TRACH DNA AMP PROBE: CPT | Performed by: NURSE PRACTITIONER

## 2019-01-02 PROCEDURE — 81001 URINALYSIS AUTO W/SCOPE: CPT

## 2019-01-02 PROCEDURE — 36415 COLL VENOUS BLD VENIPUNCTURE: CPT | Performed by: NURSE PRACTITIONER

## 2019-01-02 PROCEDURE — 80053 COMPREHEN METABOLIC PANEL: CPT | Performed by: NURSE PRACTITIONER

## 2019-01-02 PROCEDURE — 85025 COMPLETE CBC W/AUTO DIFF WBC: CPT | Performed by: NURSE PRACTITIONER

## 2019-01-02 PROCEDURE — 87086 URINE CULTURE/COLONY COUNT: CPT

## 2019-01-02 PROCEDURE — 84702 CHORIONIC GONADOTROPIN TEST: CPT | Performed by: NURSE PRACTITIONER

## 2019-01-02 PROCEDURE — 81025 URINE PREGNANCY TEST: CPT | Performed by: EMERGENCY MEDICINE

## 2019-01-02 PROCEDURE — 87591 N.GONORRHOEAE DNA AMP PROB: CPT | Performed by: NURSE PRACTITIONER

## 2019-01-02 PROCEDURE — 99284 EMERGENCY DEPT VISIT MOD MDM: CPT

## 2019-01-02 RX ORDER — PNV NO.95/FERROUS FUM/FOLIC AC 28MG-0.8MG
1 TABLET ORAL DAILY
Qty: 30 TABLET | Refills: 0 | Status: SHIPPED | OUTPATIENT
Start: 2019-01-02 | End: 2019-02-05 | Stop reason: SDUPTHER

## 2019-01-02 NOTE — ED NOTES
Pt ambulatory to the bathroom with steady gait at this time        Johanna Nichols RN  01/02/19 9750

## 2019-01-02 NOTE — ED NOTES
Pt states she is 14 weeks pregnant and has been cramping and bleeding for two weeks  Pt reports changing her panty liner on some day about 2-3x a day  Pt reports increased bleeding today  Pt denies and N/V/D        Alhaji Alatorre RN  01/02/19 6366

## 2019-01-03 ENCOUNTER — HOSPITAL ENCOUNTER (EMERGENCY)
Facility: HOSPITAL | Age: 24
Discharge: HOME | End: 2019-01-03
Attending: EMERGENCY MEDICINE
Payer: COMMERCIAL

## 2019-01-03 ENCOUNTER — TELEPHONE (OUTPATIENT)
Dept: OBGYN CLINIC | Facility: CLINIC | Age: 24
End: 2019-01-03

## 2019-01-03 VITALS
OXYGEN SATURATION: 98 % | TEMPERATURE: 97.8 F | DIASTOLIC BLOOD PRESSURE: 62 MMHG | RESPIRATION RATE: 16 BRPM | SYSTOLIC BLOOD PRESSURE: 116 MMHG | WEIGHT: 153 LBS | HEART RATE: 80 BPM

## 2019-01-03 DIAGNOSIS — O46.90 VAGINAL BLEEDING IN PREGNANCY: Primary | ICD-10-CM

## 2019-01-03 DIAGNOSIS — O46.92 SECOND TRIMESTER BLEEDING: ICD-10-CM

## 2019-01-03 LAB
BACTERIA UR QL AUTO: ABNORMAL /HPF
BASOPHILS # BLD AUTO: 0.04 THOUSANDS/ΜL (ref 0–0.1)
BASOPHILS NFR BLD AUTO: 0 % (ref 0–1)
BILIRUB UR QL STRIP: NEGATIVE
CLARITY UR: CLEAR
COLOR UR: YELLOW
EOSINOPHIL # BLD AUTO: 0.11 THOUSAND/ΜL (ref 0–0.61)
EOSINOPHIL NFR BLD AUTO: 1 % (ref 0–6)
ERYTHROCYTE [DISTWIDTH] IN BLOOD BY AUTOMATED COUNT: 17 % (ref 11.6–15.1)
GLUCOSE UR STRIP-MCNC: NEGATIVE MG/DL
HCT VFR BLD AUTO: 34.5 % (ref 34.8–46.1)
HGB BLD-MCNC: 10.4 G/DL (ref 11.5–15.4)
HGB UR QL STRIP.AUTO: ABNORMAL
IMM GRANULOCYTES # BLD AUTO: 0.05 THOUSAND/UL (ref 0–0.2)
IMM GRANULOCYTES NFR BLD AUTO: 1 % (ref 0–2)
KETONES UR STRIP-MCNC: NEGATIVE MG/DL
LEUKOCYTE ESTERASE UR QL STRIP: ABNORMAL
LYMPHOCYTES # BLD AUTO: 2.04 THOUSANDS/ΜL (ref 0.6–4.47)
LYMPHOCYTES NFR BLD AUTO: 21 % (ref 14–44)
MCH RBC QN AUTO: 24.4 PG (ref 26.8–34.3)
MCHC RBC AUTO-ENTMCNC: 30.1 G/DL (ref 31.4–37.4)
MCV RBC AUTO: 81 FL (ref 82–98)
MONOCYTES # BLD AUTO: 0.62 THOUSAND/ΜL (ref 0.17–1.22)
MONOCYTES NFR BLD AUTO: 6 % (ref 4–12)
NEUTROPHILS # BLD AUTO: 7.03 THOUSANDS/ΜL (ref 1.85–7.62)
NEUTS SEG NFR BLD AUTO: 71 % (ref 43–75)
NITRITE UR QL STRIP: NEGATIVE
NON-SQ EPI CELLS URNS QL MICRO: ABNORMAL /HPF
NRBC BLD AUTO-RTO: 0 /100 WBCS
PH UR STRIP.AUTO: 5.5 [PH] (ref 4.5–8)
PLATELET # BLD AUTO: 334 THOUSANDS/UL (ref 149–390)
PMV BLD AUTO: 10.2 FL (ref 8.9–12.7)
PROT UR STRIP-MCNC: NEGATIVE MG/DL
RBC # BLD AUTO: 4.26 MILLION/UL (ref 3.81–5.12)
RBC #/AREA URNS AUTO: ABNORMAL /HPF
SP GR UR STRIP.AUTO: >=1.03 (ref 1–1.03)
UROBILINOGEN UR QL STRIP.AUTO: 0.2 E.U./DL
WBC # BLD AUTO: 9.89 THOUSAND/UL (ref 4.31–10.16)
WBC #/AREA URNS AUTO: ABNORMAL /HPF

## 2019-01-03 PROCEDURE — 87086 URINE CULTURE/COLONY COUNT: CPT

## 2019-01-03 PROCEDURE — 99284 EMERGENCY DEPT VISIT MOD MDM: CPT

## 2019-01-03 PROCEDURE — 81001 URINALYSIS AUTO W/SCOPE: CPT

## 2019-01-03 PROCEDURE — 36415 COLL VENOUS BLD VENIPUNCTURE: CPT | Performed by: EMERGENCY MEDICINE

## 2019-01-03 PROCEDURE — 85025 COMPLETE CBC W/AUTO DIFF WBC: CPT | Performed by: EMERGENCY MEDICINE

## 2019-01-03 NOTE — CONSULTS
Consultation - Obstetrics & Gynecology   Samosn Herndon 26 y o  female MRN: [id number]  Unit/Bed#: ED 23 Encounter: [id number]    Chief Complaint   Patient presents with    Vaginal Bleeding - Pregnant     Patient presents reporting increased vaginal bleeding, reports blood clot presence, and worsening pain  Seen yesterday for same thing, reports symptoms are worsening  14 weeks pregnant  History of Present Illness   Physician Requesting Consult: Jc Duran MD  Reason for Consult / Principal Problem: Vaginal Bleeding in Pregnancy   Subspeciality: OB-GYN  HPI: 26 y o  Carmen Miramontes at approximately 14 weeks 1 day by patients LMP 9/26/2018 presents for continued evaluation of vaginal bleeding  Patient reports on and off vaginal bleeding for the past 2 weeks, stating that she was passing large clots around Pia time with eventual decrease in bleeding until early yesterday evening, when she reported passing dark blood  Patient denies ever bleeding enough to fill a pad  Patient has yet to establish prenatal care for current pregnancy  Patient seen in the ED earlier this morning for vaginal bleeding, was assessed to be in clinically stable, and was subsequently discharged from the ED with plan for follow-up in the outpatient setting  She called the nursing line at [facility] reporting continued vaginal bleeding and told to seek follow-up in the ED  Since earlier this afternoon, patient reports continued dark red bleeding, along with passing clots the size of a quarter  Occasional cramping, but denies persistent abdominal pain  Denies lightheadedness, dizziness, numbness, weakness  Denies fever, chills, nausea, vomiting, urinary complaints  Inpatient consult to Obstetrics / Gynecology  Consult performed by: Zoila Slade ordered by: Delma VITAL          Review of Systems   Constitutional: Negative for chills, diaphoresis, fatigue and fever     Respiratory: Negative for choking, chest tightness and shortness of breath  Cardiovascular: Negative for chest pain, palpitations and leg swelling  Gastrointestinal: Negative for abdominal pain, constipation, diarrhea, nausea and vomiting  Genitourinary: Positive for vaginal bleeding  Negative for dysuria, hematuria, pelvic pain, vaginal discharge and vaginal pain  Musculoskeletal: Negative for arthralgias, gait problem, joint swelling and myalgias  Neurological: Negative for dizziness, weakness, light-headedness and numbness  Historical Information   History reviewed  No pertinent past medical history  History reviewed  No pertinent surgical history  OB History    Para Term  AB Living   1             SAB TAB Ectopic Multiple Live Births                  # Outcome Date GA Lbr Fabian/2nd Weight Sex Delivery Anes PTL Lv   1 Current                 History reviewed  No pertinent family history  Social History   History   Alcohol Use No     History   Drug Use No     History   Smoking Status    Never Smoker   Smokeless Tobacco    Never Used       Meds/Allergies   No current facility-administered medications for this encounter  No Known Allergies    Objective   Vitals: Blood pressure 121/60, pulse 79, temperature 97 8 °F (36 6 °C), temperature source Temporal, resp  rate 18, weight 69 4 kg (153 lb), last menstrual period 2018, SpO2 100 %  There is no height or weight on file to calculate BMI  No intake or output data in the 24 hours ending 19 1822    Invasive Devices     Peripheral Intravenous Line            Peripheral IV 19 Right Antecubital less than 1 day                Physical Exam   Constitutional: She is oriented to person, place, and time  She appears well-developed and well-nourished  No distress  Cardiovascular: Normal rate, regular rhythm, normal heart sounds and intact distal pulses  Pulmonary/Chest: Effort normal and breath sounds normal  No respiratory distress   She has no wheezes  Abdominal: Soft  Bowel sounds are normal  She exhibits no distension  There is no tenderness  Genitourinary: There is no rash, tenderness or lesion on the right labia  There is no rash, tenderness or lesion on the left labia  There is bleeding in the vagina  No tenderness in the vagina  No vaginal discharge found  Genitourinary Comments: Dark brown old blood in the vaginal vault, no visible clots  Cervix visually long, minimal visual dilation-fingertip on digital check    Neurological: She is alert and oriented to person, place, and time  Skin: Skin is warm and dry  She is not diaphoretic  Psychiatric: She has a normal mood and affect   Her behavior is normal        Lab Results:   Admission on 01/03/2019   Component Date Value    WBC 01/03/2019 9 89     RBC 01/03/2019 4 26     Hemoglobin 01/03/2019 10 4*    Hematocrit 01/03/2019 34 5*    MCV 01/03/2019 81*    MCH 01/03/2019 24 4*    MCHC 01/03/2019 30 1*    RDW 01/03/2019 17 0*    MPV 01/03/2019 10 2     Platelets 10/54/4532 334     nRBC 01/03/2019 0     Neutrophils Relative 01/03/2019 71     Immat GRANS % 01/03/2019 1     Lymphocytes Relative 01/03/2019 21     Monocytes Relative 01/03/2019 6     Eosinophils Relative 01/03/2019 1     Basophils Relative 01/03/2019 0     Neutrophils Absolute 01/03/2019 7 03     Immature Grans Absolute 01/03/2019 0 05     Lymphocytes Absolute 01/03/2019 2 04     Monocytes Absolute 01/03/2019 0 62     Eosinophils Absolute 01/03/2019 0 11     Basophils Absolute 01/03/2019 0 04     Color, UA 01/03/2019 Yellow     Clarity, UA 01/03/2019 Clear     pH, UA 01/03/2019 5 5     Leukocytes, UA 01/03/2019 Small*    Nitrite, UA 01/03/2019 Negative     Protein, UA 01/03/2019 Negative     Glucose, UA 01/03/2019 Negative     Ketones, UA 01/03/2019 Negative     Urobilinogen, UA 01/03/2019 0 2     Bilirubin, UA 01/03/2019 Negative     Blood, UA 01/03/2019 Large*    Specific Gravity, UA 2019 >=1 030     RBC, UA 2019 2-4*    WBC, UA 2019 2-4*    Epithelial Cells 2019 Occasional     Bacteria, UA 2019 Occasional      Imaging Studies: I have personally reviewed pertinent reports  BPD: 2 54-2 66 cm (85q1g-34a4p)  Femur Length: 1 55-1 66 cm ([id number])  FHT: 162      EKG, Pathology, and Other Studies: I have personally reviewed pertinent reports  Assessment/Plan     Assessment/Plan:   at 14w1d gestation with threatened  with otherwise reassuring fetal status  Bleeding precautions reviewed for return to ED: heavy bleeding soaking more than 1 pad per hour, continual passage of large clots; episodes of dizziness, light headedness, passing out; or infectious symptoms  Strongly encourage patient to establish prenatal care with [facility] early next week  Will refer to  center at that time for early anatomy ultrasound to confirm dating  As blood type A+, Rhogam not indicated  Code Status: No Order  Advance Directive and Living Will:      Power of :    POLST:      Counseling / Coordination of Care  Total floor / unit time spent today15 minutes  minutes  Greater than 50% of total time was spent with the patient and / or family counseling and / or coordination of care      Behzad Mensah DO

## 2019-01-03 NOTE — ED ATTENDING ATTESTATION
Yahaira Brock MD, saw and evaluated the patient  I have discussed the patient with the resident/non-physician practitioner and agree with the resident's/non-physician practitioner's findings, Plan of Care, and MDM as documented in the resident's/non-physician practitioner's note, except where noted  All available labs and Radiology studies were reviewed  At this point I agree with the current assessment done in the Emergency Department  I have conducted an independent evaluation of this patient a history and physical is as follows:    25 YO female presents for evaluation of vaginal bleeding during pregnancy  Pt was evaluated for the same yesterday in the ED  States the bleeding has been waxing and waning, currently generally mild  Pt denies abdominal discomfort at this time  She denies dysuria, burning on urination or blood in urine  Pt denies CP/SOB/F/C/N/V/D/C, no dysuria, burning on urination or blood in urine  Gen: Pt is in NAD  HEENT: Head is atraumatic, EOM's intact, neck has FROM  Chest: CTAB, non-tender  Heart: RRR  Abdomen: Soft, NT/ND  Musculoskeletal: FROM in all extremities  Skin: No rash, no ecchymosis  Neuro: Awake, alert, oriented x4; Cranial nerves II-XII intact  Psych: Normal affect    MDM - Pt with continuing vaginal bleeding, states this is generally mild, thinks she may be ~14 weeks pregnant  Will check U/S, Hgb  Will speak with OB        Critical Care Time  CritCare Time    Procedures

## 2019-01-03 NOTE — DISCHARGE INSTRUCTIONS
You are having vaginal bleeding with pregnancy  This may be a threatened /miscarriage  You are to call [facility] tomorrow for an appointment       Sangrado vaginal shannan el primer trimestre   LO QUE NECESITA SABER:   El sangrado vaginal shannan el primer trimestre es un sangrado que ocurre shannan las primeras 13 semanas del [place]  El ritmo cardíaco y el tamaño del corazón de reina bebé están bethany  [facility]CCIONES SOBRE [facility]:   Regrese al departamento de emergencias si:   · Usted tiene fiebre  · Usted tiene dolor o cólicos en el abdomen o la parte baja de la espalda  · Usted tiene sangrado vaginal severo o coágulos  · Le sale un material que parece tejido o coágulos grandes  Recolecte el material y tráigalo con usted  Pregúntele a reina Alina Guise vitaminas y minerales son adecuados para usted  Usted tiene preguntas o inquietudes acerca de reina condición o cuidado  Cuidados personales:   · Esté atenta de reina sangrado  Use toallas sanitarias para saber exactamente cuánto sangrado vaginal está teniendo  No use tampones  Anote cuántos protectores [place] al día  · Pregunte acerca de la [place]  Es probable que usted necesite descansar, limitar ciertas actividades o evitar tener relaciones sexuales hasta que mejoren joslyn síntomas  Acuda a joslyn consultas de control con reina médico según le indicaron  Anote joslyn preguntas para que se acuerde de hacerlas shannan joslyn visitas  ©  2600 [employer] Information is for End User's use only and may not be sold, redistributed or otherwise used for commercial purposes  All illustrations and images included in CareNotes® are the copyrighted property of A D A M , Inc  or Jean Peterson  Esta información es sólo para uso en educación  Reina intención no es darle un consejo médico sobre enfermedades o tratamientos   Colsulte con reina médico, enfermera o farmacéutico antes de seguir cualquier régimen médico para saber si es seguro y efectivo para usted  Amenaza de aborto natural   LO QUE NECESITA SABER:   Caresse Speaks de aborto ocurre cuando se tiene sangrado vaginal dentro de las primeras 20 semanas de embarazo  Eso indica que podría ocurrir un aborto natural  Caresse Speaks de un aborto natural también se le conoce daniela courtney amenaza de pérdida del [facility]  INSTRUCCIONES SOBRE [facility]:   Regrese a la rosie de emergencias si:   · Se siente débil o que se desmaya  · Tiene dolor o cólicos en el abdomen o en la espalda que empeoran  · Tiene sangrado vaginal que en courtney hora empapa por lo menos 1 toalla sanitaria  · Le sale un material que parece tejido o coágulos grandes  Comuníquese con jones médico o obstreta si:   · Usted tiene fiebre  · Tiene problemas para orinar, siente ardor o la necesidad de orinar con frecuencia  · Tiene sangrado vaginal nuevo o que empeora  · Tiene dolor o comezón vaginal o flujo vaginal de color amarillo o joao o maloliente  · Usted tiene preguntas o inquietudes acerca de jones condición o cuidado  Cuidados personales:  Los siguientes podrían ayudar a controlar los síntomas y disminuir jones riesgo de un aborto natural:  · No se ponga nada dentro de jones vagina  No tenga relaciones sexuales, no tome duchas vaginales ni use tampones  Estas acciones pueden aumentar jones riesgo de courtney infección o de un aborto natural      · Repose según le indicaron  No practique ningún ejercicio ni actividades vigorosas  Estas actividades pueden causar un parto prematuro o un aborto natural  Pregunte a jones médico cuáles actividades físicas son las apropiadas para usted  Manténgase saludable shannan el embarazo:   · Consuma alimentos saludables y variados  Los alimentos sanos pueden ayudarla a obtener las proteínas y calorías adicionales que usted necesita shannan el [facility]   Los alimentos saludables incluyen frutas, verduras, pan integral, productos lácteos bajos en grasa, frijoles, kirk magras y pescado  Evite la carne y pescado crudos o que no estén cocinados completamente  Consulte con reina médico en [place] que sea necesario que siga courtney dieta especial      · [place] vitaminas prenatales según las indicaciones  Estas ayudan a obtener la cantidad correcta de vitaminas y minerales  También podrían ayudar a disminuir el riesgo de ciertos defectos de nacimiento  · No consuma alcohol ni drogas ilegales  Estos pueden aumentar el riesgo de un aborto espontáneo o perjudicar al bebé  · No fume  La nicotina y otros químicos en los cigarrillos y cigarros pueden perjudicar a reina bebé y provocar un aborto natural o un parto prematuro  Pida información a reina médico si usted actualmente fuma y necesita ayuda para dejar de fumar  Los cigarrillos electrónicos o tabaco sin humo todavía contienen nicotina  No use estos productos  · Disminuya el riesgo de courtney infección  Siempre lave joslyn bartolo antes de comer o preparar alimentos  No pase tiempo con gente que está enferma  Pregúntele a reina médico si necesita vacunas daniela la vacuna contra la gripe o la hepatitis B  Las vacunas pueden disminuir reina riesgo de contraer infecciones que pueden causar un aborto espontáneo  · Controle joslyn afecciones médicas  Cloretta Favre control reina presión arterial y azúcar en la caryl  Mantenga un peso saludable shannan Cristine Fryer  Programe courtney emmanuel con reina obstétrico daniela se le indique:  Es posible que usted deba acudir a consulta con reina ginecólogo frecuentemente para que le ordene ecografías o más exámenes de [place]  Anote joslyn preguntas para que se acuerde de hacerlas shannan joslyn visitas  © 2017 [employer] Information is for End User's use only and may not be sold, redistributed or otherwise used for commercial purposes  All illustrations and images included in CareNotes® are the copyrighted property of A D A M , Inc  or Jean Peterson  Esta información es sólo para uso en educación   Reina intención no es darle un consejo médico sobre enfermedades o tratamientos  Colsulte con jones Marisol Heidi farmacéutico antes de seguir cualquier régimen médico para saber si es seguro y efectivo para usted

## 2019-01-03 NOTE — DISCHARGE INSTRUCTIONS
El embarazo de la semana 15 a la 18   LO QUE NECESITA SABER:   Ahora que usted está en jones nikole trimestre, tiene [place]  Es posible que también sienta más [place] de lo normal  Usted podría comenzar a experimentar otros síntomas, daniela acidez o mareos  Es posible que usted esté aumentando de ½ a 1 shelia por semana, y que jones embarazo se empiece a notar  Posiblemente usted necesite comenzar a usar ropa de maternidad  INSTRUCCIONES SOBRE EL [facility]ITAL[facility]:   Busque atención médica de inmediato si:   · Usted tiene dolor o cólicos en el abdomen o la parte baja de la espalda  · Usted tiene sangrado vaginal abundante o coágulos  · Le sale un material que parece tejido o coágulos grandes  Recolecte el material y tráigalo con usted  Pregúntele a jones [place] vitaminas y minerales son adecuados para usted  · Usted no puede retener alimentos ni líquidos y está perdiendo [place]  · Usted tiene un sangrado leve  · Usted tiene escalofríos o fiebre  · Usted tiene comezón, ardor o dolor vaginal      · Usted tiene courtney secreción vaginal amarillenta, verdosa, derek o de [employer]  · Usted tiene dolor o ardor al Jacqlyn Peels, orina menos de lo habitual o tiene [place] rosada o sanguinolenta  · Usted tiene preguntas o inquietudes acerca de jones condición o cuidado  Cómo cuidarse en esta etapa de jones embarazo:   · Controle la acidez  comiendo 4 o 5 comidas pequeñas cada día en vez de comidas grandes  Evite los alimentos picantes  Evite comer hannah antes de irse a la cama  · Controle la náusea y el vómito  Evite los alimentos grasosos y picantes  Coma comidas pequeñas shannan el día en vez de porciones grandes  El jengibre puede ayudar a [employer]  Consulte con jones médico acerca de otras formas para disminuir las náuseas y el vómito  · Consuma alimentos saludables y variados    Alimentos saludables incluyen frutas, verduras, panes de cindy integral, alimentos lácteos bajos en grasa, frijoles, kirk magras y pescado  [place] líquidos daniela se le haya indicado  Pregunte cuánto líquido debe mega cada día y cuáles líquidos son los más adecuados para usted  Limite el consumo de cafeína a menos de [address]  Limite el consumo de pescado a 2 porciones cada semana  Escoja pescado con concentraciones bajas de sivan daniela atún al natural enlatado, camarón, salmón, bacalao o tilapia  No  coma pescado con concentraciones altas de sivan daniela pez marlene, caballa gigante, pargo rayado y tiburón  · [id number]  Jones necesidad de ciertas vitaminas y [address] [facility] [place], daniela el ácido fólico, aumenta shannan el [facility]  Las vitaminas prenatales proporcionan algunas de las vitaminas y minerales adicionales que usted necesita  Las vitaminas prenatales también podrían ayudar a disminuir el riesgo de ciertos defectos de nacimiento  · No fume  Si usted fuma, nunca es demasiado tarde para dejar de hacerlo  Fumar aumenta el riesgo de aborto espontáneo y otros problemas de angeles shannan jones [facility]  Fumar puede causar que jones bebé nazca antes de tiempo o que pese menos al nacer  Solicite información a jones médico si usted necesita ayuda para dejar de fumar  · No consuma alcohol  El alcohol pasa de jones cuerpo al bebé a través de la placenta  Puede afectar el desarrollo del cerebro de jones bebé y provocar el síndrome de alcoholismo fetal (SAF)  FAS es un vamsi de condiciones que causan [address], de comportamiento y de crecimiento  · Consulte con jones médico antes de mega cualquier medicamento  Muchos medicamentos pueden perjudicar a jones bebé si usted los manjeet [address]  No tome ningún medicamento, vitaminas, hierbas o suplementos sin cindy consultar con jones Jackie Jorje  use drogas ilegales o de la gaitan (daniela marihuana o cocaína) mientras está embarazada  Consejos de seguridad shannan el embarazo:   · Evite jacuzzis y saunas    No use un jacuzzi o un sauna mientras usted está embarazada, especialmente shannan el primer trimestre  [place]  y los saunas aumentan la temperatura de reina bebé y el riesgo de defectos de nacimiento  · Evite la toxoplasmosis  [place] es courtney infección causada por comer carne cruda o estar cerca del excremento de un lj infectado  [place] puede causar malformaciones congénitas, aborto espontáneo y Stevie Schein  Lávese las bartolo después de tocar carne cruda  Asegúrese de que la carne esté bethany cocida antes de comerla  Evite los huevos crudos y la Maria [place]  Use guantes o pida que alguien la ayude a limpiar la caja de arena del lj mientras usted Pamelia Slot  Cambios que están ocurriendo con reina bebé:  Para las 18 semanas, reina bebé podría medir alrededor de 6 pulgadas de jose desde la aleyda hasta la rabadilla (el cóccix)  Es posible que reina bebé pese alrededor de 11 onzas  Usted podría sentir los movimientos de reina bebé aproximadamente a las 18 semanas o después  Podría ser que los primeros movimientos no idnio tan notorios  Los movimientos podrían sentirse daniela courtney sensación de revoloteo  Reina bebé también hace movimientos de succión y puede escuchar ciertos sonidos  Lo que necesita saber acerca del cuidado prenatal:  Shannan las primeras 29 semanas de reina embarazo, usted tendrá citas mensuales con reina médico  Reina médico le revisará reina presión arterial y peso  Es posible que también necesite alguno de los siguientes tratamientos:  · Un examen de orina  también podría realizarse para revisarle el azúcar y la proteína  Estas son señales de diabetes gestacional o de infección  · Los análisis de caryl  se pueden realizar para revisar si tiene signos de anemia (nivel bajo del kendall)  · La revisión de la altura del fondo uterino  es courtney medición del útero para controlar el desarrollo de reina bebé  [employer] por lo general es igual al número de [address] que usted tiene de embarazo      · Ashley Ibarra podría realizarse para revisar el desarrollo de reina bebé  Es posible que reina médico pueda decirle cuál es el sexo de reina bebé shannan la ecografía  · El ritmo cardíaco de reina bebé  será revisado  © 2017 [address] Information is for End User's use only and may not be sold, redistributed or otherwise used for commercial purposes  All illustrations and images included in CareNotes® are the copyrighted property of A D A M , Inc  or Jean Peterson  Esta información es sólo para uso en educación  Reina intención no es darle un consejo médico sobre enfermedades o tratamientos  Colsulte con reina Nayana Lesser farmacéutico antes de seguir cualquier régimen médico para saber si es seguro y efectivo para usted

## 2019-01-03 NOTE — ED PROVIDER NOTES
History  Chief Complaint   Patient presents with    Vaginal Bleeding - Pregnant     Patient presents reporting increased vaginal bleeding, reports blood clot presence, and worsening pain  Seen yesterday for same thing, reports symptoms are worsening  14 weeks pregnant  Patient seen here yesterday evaluated for vaginal bleeding  Approximately 14 weeks per last menstrual period  Patient is a   She had vaginal bleeding and stable hemoglobin; had 2 large clots this morning; comes back for re-evaluation  She states she called and scheduled OB appointment and they recommended she be re-evaluated again  Denies any chest pain; shortness of breath  Notes intermittent cramping pain left pelvic area  Started last night; is not present now  Denies any urinary symptoms  Had a urine checked yesterday; along with a CBC and CMP  Denies any other symptoms at this time  Has not yet had a formal dating ultrasound  Heart rate within normal limits (144 at bedside)  States going through 3 pads per day            Prior to Admission Medications   Prescriptions Last Dose Informant Patient Reported? Taking? Prenatal Vit-Fe Fumarate-FA (PRENATAL VITAMIN) 28-0 8 mg   No Yes   Sig: Take 1 tablet by mouth daily      Facility-Administered Medications: None       History reviewed  No pertinent past medical history  History reviewed  No pertinent surgical history  History reviewed  No pertinent family history  I have reviewed and agree with the history as documented  Social History   Substance Use Topics    Smoking status: Never Smoker    Smokeless tobacco: Never Used    Alcohol use No        Review of Systems   Constitutional: Negative for activity change, appetite change, chills and fever  HENT: Negative for congestion, ear pain, rhinorrhea, sore throat and trouble swallowing  Eyes: Negative for photophobia and pain     Respiratory: Negative for cough, chest tightness, shortness of breath and wheezing  Cardiovascular: Negative for chest pain and palpitations  Gastrointestinal: Negative for abdominal distention, abdominal pain, constipation, diarrhea, nausea and vomiting  Genitourinary: Positive for pelvic pain and vaginal bleeding  Negative for decreased urine volume, dysuria, flank pain, hematuria, urgency and vaginal discharge  Musculoskeletal: Negative for arthralgias, back pain, joint swelling, neck pain and neck stiffness  Skin: Negative for color change, pallor, rash and wound  Neurological: Negative for dizziness, seizures, syncope, weakness, light-headedness and headaches  Hematological: Negative for adenopathy  Psychiatric/Behavioral: Negative for confusion, hallucinations and self-injury  Physical Exam  ED Triage Vitals [01/03/19 1618]   Temperature Pulse Respirations Blood Pressure SpO2   97 8 °F (36 6 °C) 79 18 121/60 100 %      Temp Source Heart Rate Source Patient Position - Orthostatic VS BP Location FiO2 (%)   Temporal Monitor Sitting Right arm --      Pain Score       5           Orthostatic Vital Signs  Vitals:    01/03/19 1618 01/03/19 1849   BP: 121/60 116/62   Pulse: 79 80   Patient Position - Orthostatic VS: Sitting Sitting       Physical Exam   Constitutional: She is oriented to person, place, and time  She appears well-developed and well-nourished  No distress  HENT:   Head: Normocephalic and atraumatic  Mouth/Throat: Oropharynx is clear and moist  No oropharyngeal exudate  Eyes: Pupils are equal, round, and reactive to light  Conjunctivae and EOM are normal  Right eye exhibits no discharge  Left eye exhibits no discharge  Neck: Normal range of motion  Neck supple  No tracheal deviation present  No thyromegaly present  Cardiovascular: Normal rate and normal heart sounds  No murmur heard  Pulmonary/Chest: Effort normal and breath sounds normal  No respiratory distress  She has no wheezes  She has no rales     No increased work of breathing comfortable   Abdominal: Soft  Bowel sounds are normal  She exhibits no distension  There is no tenderness  There is no rebound and no guarding  No reproducible abdominal discomfort  Musculoskeletal: Normal range of motion  She exhibits no edema, tenderness or deformity  Lymphadenopathy:     She has no cervical adenopathy  Neurological: She is alert and oriented to person, place, and time  No cranial nerve deficit  She exhibits normal muscle tone  Skin: Skin is warm  Capillary refill takes less than 2 seconds  No rash noted  She is not diaphoretic  No erythema  Psychiatric: She has a normal mood and affect   Her behavior is normal        ED Medications  Medications - No data to display    Diagnostic Studies  Results Reviewed     Procedure Component Value Units Date/Time    Urine Microscopic [[id number]]  (Abnormal) Collected:  01/03/19 1738    Lab Status:  Final result Specimen:  Urine from Urine, Clean Catch Updated:  01/03/19 1747     RBC, UA 2-4 (A) /hpf      WBC, UA 2-4 (A) /hpf      Epithelial Cells Occasional /hpf      Bacteria, UA Occasional /hpf     CBC and differential [[id number]]  (Abnormal) Collected:  01/03/19 1725    Lab Status:  Final result Specimen:  Blood from Arm, Right Updated:  01/03/19 1730     WBC 9 89 Thousand/uL      RBC 4 26 Million/uL      Hemoglobin 10 4 (L) g/dL      Hematocrit 34 5 (L) %      MCV 81 (L) fL      MCH 24 4 (L) pg      MCHC 30 1 (L) g/dL      RDW 17 0 (H) %      MPV 10 2 fL      Platelets 270 Thousands/uL      nRBC 0 /100 WBCs      Neutrophils Relative 71 %      Immat GRANS % 1 %      Lymphocytes Relative 21 %      Monocytes Relative 6 %      Eosinophils Relative 1 %      Basophils Relative 0 %      Neutrophils Absolute 7 03 Thousands/µL      Immature Grans Absolute 0 05 Thousand/uL      Lymphocytes Absolute 2 04 Thousands/µL      Monocytes Absolute 0 62 Thousand/µL      Eosinophils Absolute 0 11 Thousand/µL      Basophils Absolute 0 04 Thousands/µL     Urine culture [[id number]] Collected:  01/03/19 1738    Lab Status: In process Specimen:  Urine from Urine, Clean Catch Updated:  01/03/19 1725    POCT urinalysis dipstick [[id number]]  (Abnormal) Resulted:  01/03/19 1721    Lab Status:  Final result Updated:  01/03/19 1723    ED Urine Macroscopic [[id number]]  (Abnormal) Collected:  01/03/19 1738    Lab Status:  Final result Specimen:  Urine Updated:  01/03/19 1721     Color, UA Yellow     Clarity, UA Clear     pH, UA 5 5     Leukocytes, UA Small (A)     Nitrite, UA Negative     Protein, UA Negative mg/dl      Glucose, UA Negative mg/dl      Ketones, UA Negative mg/dl      Urobilinogen, UA 0 2 E U /dl      Bilirubin, UA Negative     Blood, UA Large (A)     Specific Gravity, UA >=1 030    Narrative:       CLINITEK RESULT                 No orders to display         Procedures  Procedures      Phone Consults  ED Phone Contact    ED Course  ED Course as of Jan 03 2132   Thu Jan 03, 2019   1705 Heart rate 144    1726 Spoke with OB will be down to evaluate    1738 9 6 prior Hemoglobin: (!) 10 4   1740 OB at bedside evaluating patient    1806 Past type and screen Rh positive    1810 OB resident checking with attending if patient cleared for discharge were discharged with OB follow-up                                MDM  Number of Diagnoses or Management Options  Second trimester bleeding:   Vaginal bleeding in pregnancy:   Diagnosis management comments: Likely 2nd trimester pregnancy  Almost 16 weeks per our bedside ultrasound  Ob came down and evaluated  OB also gave information for follow-up  Hemoglobin has increased since yesterday  Prior type and screen is Rh positive no indication for RhoGAM   Heart rate of 144 for fetus  Maternal vital signs abdominal exam unremarkable    Discharge with follow-up    CritCare Time    Disposition  Final diagnoses:   Vaginal bleeding in pregnancy   Second trimester bleeding     Time reflects when diagnosis was documented in both MDM as applicable and the Disposition within this note     Time User Action Codes Description Comment    1/3/2019  6:14 PM Robin Matias Add [O46 90] Vaginal bleeding in pregnancy     1/3/2019  7:03 PM Fang Delgado Add [O46 92] Second trimester bleeding       ED Disposition     ED Disposition Condition Comment    [place]  [facility] [place]/self care  Condition at discharge: Good        Follow-up Information     Follow up With Specialties Details Why [address]igo [address]  Obstetrics and Gynecology Call Please call to scheduled follow-up appointment for early next week  [place]  [phone]  [facility] [facility] [address], [address], [address] [place] [address] Emergency Department Emergency Medicine Go in 1 day As needed, If symptoms worsen [facility] [facility]  [phone] [place] ED, [address] [place] [address], [phone]          Discharge Medication List as of 1/3/2019  7:03 PM      CONTINUE these medications which have NOT CHANGED    Details   Prenatal Vit-Fe Fumarate-FA (PRENATAL VITAMIN) 28-0 8 mg Take 1 tablet by mouth daily, Starting Wed 1/2/2019, Print           No discharge procedures on file  ED Provider  Attending physically available and evaluated [facility]  I managed the patient along with the ED Attending      Electronically Signed by         Reymundo Romero DO  01/03/19 1598

## 2019-01-03 NOTE — TELEPHONE ENCOUNTER
Patient called stating that she was seen in the ER on 1/2/2019 and that she was told to go back to the ER  For uncontrolled pain and heavy vaginal bleeding  Patient states that she has left  pelvic pain  Patient instructed to go to ER for evaluation

## 2019-01-03 NOTE — ED NOTES
Pt watching TV in room with no complaints at this time  RN updated pt       Marilee Emmanuel, JENNIFER  01/02/19 8370

## 2019-01-03 NOTE — ED PROVIDER NOTES
History  Chief Complaint   Patient presents with    Vaginal Bleeding     Patient reports she is 14 weeks pregnant and has been experiencing spotting X2 weeks  No call to OB  This is a 26year old female who was pregnant in July 2018, had a miscarriage and came to [facility] ED for evaluation  She states she has no insurance and has not followed up  She states she is now approximately 14 weeks pregnant with LMP 9/26/18 and is now having vaginal bleeding x 1 week  She has not followed up with OB and came to the ED for eval  She states she is using approximately 3 panty liners a day  Denies passing clots  History provided by:  Patient and medical records   used: Yes (rubio )    Vaginal Bleeding   Severity:  Mild  Duration:  1 week  Possible pregnancy: yes        None       History reviewed  No pertinent past medical history  History reviewed  No pertinent surgical history  History reviewed  No pertinent family history  I have reviewed and agree with the history as documented  Social History   Substance Use Topics    Smoking status: Never Smoker    Smokeless tobacco: Never Used    Alcohol use No        Review of Systems   Constitutional: Negative  HENT: Negative  Eyes: Negative  Respiratory: Negative  Gastrointestinal: Negative  Endocrine: Negative  Genitourinary: Positive for vaginal bleeding  Musculoskeletal: Negative  Skin: Negative  Allergic/Immunologic: Negative  Hematological: Negative  Psychiatric/Behavioral: Negative  Physical Exam  Physical Exam   Constitutional: She is oriented to person, place, and time  She appears well-developed and well-nourished  No distress  HENT:   Head: Normocephalic and atraumatic  Eyes: Pupils are equal, round, and reactive to light  EOM are normal    Neck: Normal range of motion  Neck supple  Cardiovascular: Normal rate, regular rhythm and normal heart sounds      Pulmonary/Chest: Effort normal and breath sounds normal    Abdominal: Soft  Bowel sounds are normal  She exhibits no distension  There is tenderness  Genitourinary:   Genitourinary Comments: Pelvic exam: accompanied by Rafia Duke    + vaginal bleeding with insertion of speculum coming from OS  OS appears closed  Cervical GC/Chlamydia culture obtained and sent  Musculoskeletal: Normal range of motion  Neurological: She is alert and oriented to person, place, and time  Skin: Skin is warm and dry  Capillary refill takes less than 2 seconds  She is not diaphoretic  Psychiatric: She has a normal mood and affect  Her behavior is normal  Judgment and thought content normal    Nursing note and vitals reviewed  Vital Signs  ED Triage Vitals [01/02/19 1737]   Temperature Pulse Respirations Blood Pressure SpO2   98 4 °F (36 9 °C) 86 16 131/60 99 %      Temp Source Heart Rate Source Patient Position - Orthostatic VS BP Location FiO2 (%)   Temporal Monitor Sitting Right arm --      Pain Score       5           Vitals:    01/02/19 1737 01/02/19 1844 01/02/19 2030 01/02/19 2131   BP: 131/60 119/62 120/60 113/69   Pulse: 86 79 67 71   Patient Position - Orthostatic VS: Sitting Lying Lying Lying       Visual Acuity      ED Medications  Medications - No data to display    Diagnostic Studies  Results Reviewed     Procedure Component Value Units Date/Time    Chlamydia/GC amplified DNA by PCR [[id number]] Collected:  01/02/19 2203    Lab Status:   In process Specimen:  Genital from Cervix Updated:  01/02/19 2206    Quantitative hCG [[id number]]  (Abnormal) Collected:  01/02/19 1956    Lab Status:  Final result Specimen:  Blood from Arm, Left Updated:  01/02/19 2051     HCG, Quant 102,847 8 (H) mIU/mL     Narrative:          Expected Ranges:     Approximate               Approximate HCG  Gestation age          Concentration ( mIU/mL)  _____________          ______________________   Ayana Hemant                      HCG values  0 2-1 5-50  1-2                           2-3                         100-5000  3-4                         500-71235  4-5                         1000-64670  5-6                         [id number]  6-8                         74845-484790  8-12                        07557-236746    Comprehensive metabolic panel [[id number]]  (Abnormal) Collected:  01/02/19 1956    Lab Status:  Final result Specimen:  Blood from Arm, Left Updated:  01/02/19 2030     Sodium 138 mmol/L      Potassium 3 6 mmol/L      Chloride 104 mmol/L      CO2 26 mmol/L      ANION GAP 8 mmol/L      BUN 11 mg/dL      Creatinine 0 92 mg/dL      Glucose 87 mg/dL      Calcium 7 8 (L) mg/dL      AST 12 U/L      ALT 11 (L) U/L      Alkaline Phosphatase 41 (L) U/L      Total Protein 7 0 g/dL      Albumin 2 7 (L) g/dL      Total Bilirubin 0 15 (L) mg/dL      eGFR 89 ml/min/1 73sq m     Narrative:         National Kidney Disease Education Program recommendations are as follows:  GFR calculation is accurate only with a steady state creatinine  Chronic Kidney disease less than 60 ml/min/1 73 sq  meters  Kidney failure less than 15 ml/min/1 73 sq  meters      CBC and differential [[id number]]  (Abnormal) Collected:  01/02/19 1956    Lab Status:  Final result Specimen:  Blood from Arm, Left Updated:  01/02/19 2014     WBC 8 74 Thousand/uL      RBC 3 88 Million/uL      Hemoglobin 9 6 (L) g/dL      Hematocrit 31 2 (L) %      MCV 80 (L) fL      MCH 24 7 (L) pg      MCHC 30 8 (L) g/dL      RDW 16 7 (H) %      MPV 10 1 fL      Platelets 235 Thousands/uL      nRBC 0 /100 WBCs      Neutrophils Relative 69 %      Immat GRANS % 0 %      Lymphocytes Relative 24 %      Monocytes Relative 6 %      Eosinophils Relative 1 %      Basophils Relative 0 %      Neutrophils Absolute 5 96 Thousands/µL      Immature Grans Absolute 0 03 Thousand/uL      Lymphocytes Absolute 2 07 Thousands/µL      Monocytes Absolute 0 56 Thousand/µL      Eosinophils Absolute 0 09 Thousand/µL Basophils Absolute 0 03 Thousands/µL     Urine Microscopic [[id number]]  (Abnormal) Collected:  01/02/19 1928    Lab Status:  Final result Specimen:  Urine from Urine, Clean Catch Updated:  01/02/19 1949     RBC, UA None Seen /hpf      WBC, UA 4-10 (A) /hpf      Epithelial Cells Occasional /hpf      Bacteria, UA Occasional /hpf     Urine culture [[id number]] Collected:  01/02/19 1928    Lab Status: In process Specimen:  Urine from Urine, Clean Catch Updated:  01/02/19 1925    POCT urinalysis dipstick [[id number]]  (Abnormal) Resulted:  01/02/19 1914    Lab Status:  Final result Updated:  01/02/19 1914    POCT pregnancy, urine [[id number]]  (Abnormal) Resulted:  01/02/19 1913    Lab Status:  Final result Updated:  01/02/19 1913     EXT PREG TEST UR (Ref: Negative) Positive (+)    ED Urine Macroscopic [[id number]]  (Abnormal) Collected:  01/02/19 1928    Lab Status:  Final result Specimen:  Urine Updated:  01/02/19 1912     Color, UA Yellow     Clarity, UA Clear     pH, UA 7 0     Leukocytes, UA Trace (A)     Nitrite, UA Negative     Protein, UA Negative mg/dl      Glucose, UA Negative mg/dl      Ketones, UA Negative mg/dl      Urobilinogen, UA 0 2 E U /dl      Bilirubin, UA Negative     Blood, UA Moderate (A)     Specific [place], UA 1 025    Narrative:       CLINITEK RESULT                 No orders to display              Procedures  Procedures       Phone Contacts  ED Phone Contact    ED Course  ED Course as of Jan 02 2346 Wed Jan 02, 2019 2042  per JENNIFER Alicea  2042 Hgb 9 6 which is baseline from July 2018  It appears pt has iron deficiency anemia  2104 Spoke with Darci Nj resident who states that she can be referred to [facility] as outpatient that they would not do pelvic exam in ED  Type and screen was ordered however did not show up in orders or results  Type and screen from 7/2018 pt is Rh positive    Pt was given repeat beta quant for 1/4/18 and to have results sent to [facility]     /69 (BP Location: Right arm)   Pulse 71   Temp 98 4 °F (36 9 °C) (Temporal)   Resp 18   LMP 2018   SpO2 100%     by RN                       MDM  Number of Diagnoses or Management Options  Diagnosis management comments: Differential diagnosis  Pregnancy with miscarriage  Pregnancy with vaginal bleeding    Plan  Labs  Urine  UAhcg +  FHT    Consult OB        Amount and/or Complexity of Data Reviewed  Clinical lab tests: reviewed and ordered  Review and summarize past medical records: yes      CritCare Time    Disposition  Final diagnoses:   14 weeks gestation of pregnancy   Vaginal bleeding   Threatened    Anemia     Time reflects when diagnosis was documented in both MDM as applicable and the Disposition within this note     Time User Action Codes Description Comment    2019  9:01 PM Jacinto Yogesh Add [Z3A 14] 14 weeks gestation of pregnancy     2019  9:01 PM Jacinto Yogesh Add [N93 9] Vaginal bleeding     2019 10:05 PM Meghan Shines [O20 0] Threatened      2019 10:05 PM Jacinto Yogesh Modify [Z3A 14] 14 weeks gestation of pregnancy     2019 10:05 PM Paris Sow [O20 0] Threatened      2019 10:08 PM Jacinto Yogesh Add [D64 9] Anemia       ED Disposition     ED Disposition Condition Comment    Discharge  [facility] discharge to home/self care      Condition at discharge: Good        Follow-up Information     Follow up With Specialties Details Why Contact Info Additional [facility] Schedule an appointment as soon as possible for a visit in 1 day  [place] [id number]  [address], [address] [place] [address] Emergency Department Emergency Medicine  If symptoms worsen [facility] [place] [address]  [phone] [place] ED, [address]  , [place], [place], [address] Family Medicine  call for a primary care doctor   [address] [place] [address] [place]  Jai skaggss Falguni RIVERA [address] [address] [phone]             Discharge Medication List as of 1/2/2019 10:11 PM      START taking these medications    Details   Prenatal Vit-Fe Fumarate-FA (PRENATAL VITAMIN) 28-0 8 mg Take 1 tablet by mouth daily, Starting Wed 1/2/2019, Print             Outpatient Discharge Orders  hCG, quantitative   Standing Status: Future  Standing Exp   Date: 01/02/20         ED Provider  Electronically Signed by           Cristi Red  01/02/19 5181

## 2019-01-04 LAB
BACTERIA UR CULT: NORMAL
BACTERIA UR CULT: NORMAL
C TRACH DNA SPEC QL NAA+PROBE: NEGATIVE
N GONORRHOEA DNA SPEC QL NAA+PROBE: NEGATIVE

## 2019-01-23 ENCOUNTER — OFFICE VISIT (OUTPATIENT)
Dept: OBGYN CLINIC | Facility: CLINIC | Age: 24
End: 2019-01-23

## 2019-01-23 VITALS — SYSTOLIC BLOOD PRESSURE: 110 MMHG | DIASTOLIC BLOOD PRESSURE: 81 MMHG | HEART RATE: 84 BPM | WEIGHT: 163.8 LBS

## 2019-01-23 DIAGNOSIS — O20.0 THREATENED ABORTION IN SECOND TRIMESTER: Chronic | ICD-10-CM

## 2019-01-23 DIAGNOSIS — Z34.91 PRENATAL CARE, FIRST TRIMESTER: ICD-10-CM

## 2019-01-23 DIAGNOSIS — Z3A.17 17 WEEKS GESTATION OF PREGNANCY: Primary | ICD-10-CM

## 2019-01-23 PROCEDURE — 99214 OFFICE O/P EST MOD 30 MIN: CPT | Performed by: OBSTETRICS & GYNECOLOGY

## 2019-01-23 PROCEDURE — G0145 SCR C/V CYTO,THINLAYER,RESCR: HCPCS | Performed by: OBSTETRICS & GYNECOLOGY

## 2019-01-23 NOTE — PROGRESS NOTES
Assessment and Plan      Problem List Items Addressed This Visit     17 weeks gestation of pregnancy - Primary    Relevant Orders    Prenatal Panel    QUAD Screen    Ambulatory Referral to Maternal Fetal Medicine    Prenatal care, first trimester    Relevant Orders    Liquid-based pap, screening    Threatened  in second trimester (Chronic)     Patient is experiencing vaginal bleeding on and off  Last time she was st ED on 2019  She admit the bleeding get lesser and She is having dark colored  cloths only now  Lucy Henderson is a  at 17w0d gestation via LMP being seen today for her first obstetrical visit  This is not a planned pregnancy  Her obstetrical history is significant for non-compliance and vaginal bleeding   Relationship with FOB: spouse, living together  Patient does intend to breast feed  Pregnancy history fully reviewed  Menstrual History:  OB History      Para Term  AB Living    1              SAB TAB Ectopic Multiple Live Births                      Menarche age: 7  Patient's last menstrual period was 2018  The following portions of the patient's history were reviewed and updated as appropriate: allergies, current medications, past family history, past medical history, past social history, past surgical history and problem list     Review of Systems  Review of Systems   Constitutional: Negative  HENT: Negative  Respiratory: Negative  Cardiovascular: Negative  Gastrointestinal: Negative  Genitourinary: Negative  Objective    Physical Exam   Constitutional: She is oriented to person, place, and time  She appears well-developed and well-nourished  No distress  Cardiovascular: Normal rate  Pulmonary/Chest: Effort normal    Abdominal: Soft  Musculoskeletal: Normal range of motion  Neurological: She is alert and oriented to person, place, and time  Skin: Skin is warm   She is not diaphoretic            TAUS:     BPD: 3 69 17w5d  AC 11 98 cm 17 w 2d  HC 12 92 17w2d   FL 2 54 17w5d   Placenta  posterior   No placenta previa   KAYY 6 2cm       Ambrosio Castellon MD  OBGYN, PGY-1  1/25/2019  11:57 AM

## 2019-01-24 PROBLEM — O20.0 THREATENED ABORTION IN SECOND TRIMESTER: Chronic | Status: ACTIVE | Noted: 2019-01-24

## 2019-01-24 PROBLEM — Z3A.17 17 WEEKS GESTATION OF PREGNANCY: Status: ACTIVE | Noted: 2019-01-24

## 2019-01-24 PROBLEM — Z34.91 PRENATAL CARE, FIRST TRIMESTER: Status: ACTIVE | Noted: 2019-01-24

## 2019-01-24 NOTE — ASSESSMENT & PLAN NOTE
Patient is experiencing vaginal bleeding on and off  Last time she was st ED on 01/03/2019  She admit the bleeding get lesser and She is having dark colored  cloths only now

## 2019-01-29 ENCOUNTER — INITIAL PRENATAL (OUTPATIENT)
Dept: OBGYN CLINIC | Facility: CLINIC | Age: 24
End: 2019-01-29

## 2019-01-29 ENCOUNTER — ULTRASOUND (OUTPATIENT)
Dept: PERINATAL CARE | Facility: CLINIC | Age: 24
End: 2019-01-29
Payer: COMMERCIAL

## 2019-01-29 VITALS
DIASTOLIC BLOOD PRESSURE: 73 MMHG | HEART RATE: 85 BPM | SYSTOLIC BLOOD PRESSURE: 117 MMHG | BODY MASS INDEX: 23.34 KG/M2 | HEIGHT: 70 IN | WEIGHT: 163 LBS

## 2019-01-29 VITALS
HEART RATE: 78 BPM | HEIGHT: 70 IN | SYSTOLIC BLOOD PRESSURE: 120 MMHG | DIASTOLIC BLOOD PRESSURE: 65 MMHG | BODY MASS INDEX: 24.14 KG/M2 | WEIGHT: 168.6 LBS

## 2019-01-29 DIAGNOSIS — O20.0 THREATENED ABORTION IN SECOND TRIMESTER: Primary | Chronic | ICD-10-CM

## 2019-01-29 DIAGNOSIS — Z3A.17 17 WEEKS GESTATION OF PREGNANCY: ICD-10-CM

## 2019-01-29 DIAGNOSIS — Z34.92 PRENATAL CARE IN SECOND TRIMESTER: Primary | ICD-10-CM

## 2019-01-29 PROCEDURE — 99214 OFFICE O/P EST MOD 30 MIN: CPT

## 2019-01-29 PROCEDURE — 76805 OB US >/= 14 WKS SNGL FETUS: CPT | Performed by: OBSTETRICS & GYNECOLOGY

## 2019-01-29 NOTE — PROGRESS NOTES
OB Intake  1  Prenatal care in second trimester  -Prenatal panel lab slip given  -Referral  Maternal Fetal for dating ultrasound   Patient presents for OB intake interview  o Accompanied by: self  o FOB:  - Involved: yes  o                                 o LMP:   Patient's last menstrual period was 2018  o Estimated Date of Delivery: None noted  o Signs and Symptoms of pregnancy:                 - Constipation:    no  - Headaches:   no  - Cramping/spotting:    no  - PICA cravings:    no  - Diabetes: If you answer yes, please order 1 hr gtt testing, 50grams   History of gestational diabetes    no   BMI >35     no   Advance maternal age >35   no   First degree relative with type 2 diabetes    no   History of PCOS   no   Current metformin use    no   Prior history of macrosomia or LGA    no  o Immunization Record  -   - There is no immunization history on file for this patient   o Tdap:  - Counseled to be given after 28 weeks  o Influenza vaccine discussed  o MRSA questionnaire:     negative  o Dental visit within last 6 months-no, recommendations discussed  Interview education:  Educational material provided on After Visit Summary (AVS)   Handouts given at todays visit  o [employer] phone application guide  o [facility] support center  o CDCs Response to [address] [place] [address] Maternal Fetal Medicine  - Sequential screening pamphlet  - Cystic fibrosis pamphlet  o VANESA letter given    [facility] [address] Work   - Dentist    MyChart pending    Interview done by: Bre Hilliard RN 19

## 2019-01-29 NOTE — LETTER
Proof of Pregnancy Letter    Ml Torres  1995  [address] [facility]        01/29/19      Ml Torres is a patient at our facility  Ml Torres Estimated Date of Delivery: 7/3/2019       Any questions or concerns, please feel free to contact our office      Sincerely,     [facility]

## 2019-01-29 NOTE — PATIENT INSTRUCTIONS
Señales shannan el embarazo: Cuando llamar    1  sangrado vaginal  2  Dolor abdominal dax que no desaparece  3  Fiebre (más de 100 4 y no se horace con Tylenol)  4  Vómitos persistentes que payne más de 24 horas  5  dolor de pecho  6  Dolor o ardor al orinar  7  Dolor de gail severo que no se resuelve con Tylenol  8  Visión borrosa o janie puntos en jones visión  9  Hinchazón repentina de jones josé luis o bartolo  10  Enrojecimiento, hinchazón o dolor en courtney pierna  11  Un aumento de peso repentino en pocos días  12  Contar los movimientos fetales del bebé  (después de 28 semanas o el sexto mes de embarazo)  15  Courtney pérdida de líquido acuoso de la vagina: puede ser un chorro, un goteo o courtney humedad continua  14  Después de 20 semanas de embarazo, calambres rítmicos (más de 4 por hora) o menstruales daniela dolor Doree Burch / Bedelia [place] y Embarazo:  La siguiente lista de medicamentos de Onita Erb generalmente se considera mcdowell de mega shannan el [facility]  Tenga cuidado de no duplicar los productos que contienen acetaminofén (Tylenol)  Resfriados / dolor de garganta   Robitussin DM - Simple (guaifenesina)   Aerosol nasal salino   Gárgaras de agua salada caliente   Cepacol pastillas para la garganta o enjuague bucal (cetilpiridinio)   Sucrets (hexylresoricinol)    Juleen Drum LA D - o DESCONGESANTE   Claritin (loratadine)   Zrytec (cetirizina)   Allerga (fexofenadina)      Janet de [place] / Earlene Body y molestias:   Tylenol (paracetamol) (acetaminophen)  NO exceda más de 3000 mg de Tylenol en un período de 24 horas        Acidez   Mylanta (hidróxido de aluminio / simeticona, hidróxido de magnesio)   Maalaox (hidróxido de aluminio y [place], hidróxido de magnesio)   Tums (carbonato de calcio)   Riopan (magaldrate)      Estreñimiento   Colace (docusato de sodio)   Surfak (docusato de sodio)   MiraLAX   Supositorios de glicerina   Flota enema (fosfato de sodio y bifosfato de sodio)  Náuseas vómitos   Vitamina B6 (piridoxina): puede mega 50 mg a la hora de acostarse, 25 mg por la mañana, 25 mg por la tarde   Unisom (doxilamina): se puede usar para las náuseas / vómitos (laureen courtney tableta de 25 mg por la mitad)  Puede causar somnolencia  Dormir   Benadryl (difenhidramina): tome 1-2 tabletas según sea necesario antes de acostarse   Tableta Unisom (doxilamina) de 25 mg    Tableta de melatonina de 5 mg: según sea necesario antes de acostarse      En general, la forma genérica de la medicina suele ser más económica que la forma de levon del [place]  David Side Un embarazo,   CUIDADO AMBULATORIO:   Lo qué necesita saber sobre el Georgi Ruelas: Un embarazo normal dura alrededor de 40 semanas  El primer trimestre dura desde jones último periodo hasta la semana 12 de Georgi Ruelas  El nikole trimestre se extiende desde la semana 13 de jones embarazo hasta la semana 23  El tercer trimestre se extiende desde la semana 24 de embarazo hasta que nazca jones bebé  Si usted conoce la fecha de jones último periodo, jones médico puede calcular la fecha de nacimiento de jones bebé  Es posible que usted de a maureen a jones bebé en cualquier momento desde la semana 37 hasta 2 semanas después de la fecha calculada de [place]  Busque atención médica de inmediato si:   · Usted presenta un karen dolor de gail que no desaparece  · Usted tiene cambios en la visión nuevos o en aumento, daniela visión borrosa o con manchas  · Usted tiene inflamación nueva o creciente en jones josé luis o bartolo  · Usted tiene dolor o cólicos en el abdomen o la parte baja de la espalda  · Usted tiene sangrado vaginal   Comuníquese con jones médico o obstreta si:   · Usted tiene calambres, presión o tensión abdominal     · Usted tiene un cambio en la secreción vaginal     · Usted no puede retener alimentos ni líquidos y está perdiendo [place]  · Usted tiene escalofríos o fiebre      · Usted tiene comezón, ardor o dolor vaginal      · Usted tiene [facility] secreción vaginal amarillenta, verdosa, derek o de [employer]  · Usted tiene dolor o ardor al [facility], orina menos de lo habitual o tiene [place] rosada o sanguinolenta  · Usted tiene preguntas o inquietudes acerca de jones condición o cuidado  Cambios corporales que pueden ocurrir shannan jones embarazo:   · Los cambios en los senos  que usted Kandy Gustavo sensibilidad y cosquilleo shannan la primera parte de jones [facility]  Los senos se volverán más grandes  Es posible que necesite un sostén con soporte  Es posible que usted petrona [place] secreción delgada y CIT[place]AL[place], conocida daniela calostro, que sale de joslyn pezones shannan el nikole trimestre  El calostro es un líquido que se convertirá en [place] alrededor de 3 días después de usted bari dado a maureen  · Cambios en la piel y estrías  podrían ocurrir shannan jones embarazo  Es posible que usted tenga marcas [place], conocidas daniela estrías, en jones piel  Las [employer] se desvanecen después del [facility]  Utilice crema si jones piel está seca y con comezón  La piel de jones josé luis, alrededor de los pezones y debajo de jones ombligo podría oscurecerse  La mayoría del [place], jones piel Monna Baton a jones color normal después del nacimiento de jonse bebé  · El malestar matutino  consiste en náuseas y vómitos que pueden ocurrir en cualquier momento del día  Evite los alimentos grasosos y picantes  Coma comidas pequeñas shannan el día en vez de porciones grandes  El jengibre puede ayudar a [employer]  Consulte con jones médico acerca de otras formas para disminuir las náuseas y el vómito  · Acidez estomacal  puede ser causada por los cambios hormonales shannan jones [place]  El [employer] en crecimiento puede empujar jones estómago hacia arriba y forzar ácido estomacal a acumularse dentro de jones esófago  Pearson 4 o 5 comidas pequeñas cada día en vez de comidas grandes  Evite los alimentos picantes  Evite comer hannah antes de irse a la cama      · Estreñimiento  puede desarrollarse shannan jones embarazo  Para tratar el estreñimiento, coma alimentos altos en fibra daniela cereales con fibra, frijoles, frutas, verduras, panes integrales y [place]  Cherlynn Beath de Tram regular y tome suficiente agua  Es posible que jones médico sugiera un suplemento con fibra para ablandar joslyn evacuaciones intestinales  Consulte con jones médico antes de usar cualquier medicamento para disminuir el estreñimiento  · Las hemorroides  son Jannetta Connolly grandes en el área rectal  Pueden causar dolor, comezón y sangrado de color myers vivo en jones recto  Para disminuir el riesgo de hemorroides, prevenga el estreñimiento y no se esfuerce cuando tenga courtney evacuación intestinal  Si usted tiene hemorroides, sumérjase en courtney bañera con agua tibia para aliviar la incomodidad  Consulte con jones médico cómo puede tratar las hemorroides  · Los calambres y la hinchazón en las piernas  pueden ser causados por niveles bajos de calcio o por el peso adicional del Ansley Callow  Eleve joslyn piernas por encima del nivel de jones corazón para disminuir la hinchazón  Shannan un calambre en la pierna, estire o de un masaje al [employer] que tiene el calambre  El calor puede ayudar a disminuir el dolor y los espasmos musculares  Aplique calor sobre el músculo por 20 a 30 minutos cada 2 horas por la cantidad de días que se le indique  · Dolor en la espalda  puede ocurrir a medida que jones bebé crece  No esté de pie por largos periodos de tiempo ni levante objetos pesados  Use courtney buena postura mientras esté de pie, se agache o se doble  Use zapatos de tacón bajo con un buen soporte  Descansar puede también ayudarla a aliviar el dolor de espalda  Pregunte a jones médico acerca de ejercicios que usted pueda hacer para fortalecer los músculos de jones espalda  Manténgase saludable shannan jones embarazo:   · Consuma alimentos saludables y variados    Alimentos saludables incluyen frutas, verduras, panes de cindy integral, alimentos lácteos bajos en grasa, frijoles, Aundrea Gilbert y pescado  [employer] líquidos daniela se le haya indicado  Pregunte cuánto líquido debe mega cada día y cuáles líquidos son los más adecuados para usted  Limite el consumo de cafeína a menos de [address]  Limite el consumo de pescado a 2 porciones cada semana  Escoja pescado con concentraciones bajas de sivan daniela atún ligero enlatado, camarón, cangrejo, salmón, bacalao o tilapia  No  coma pescado con concentraciones altas de sivan daniela pez marlene, caballa gigante, pargo rayado y tiburón  · [address]  Jones necesidad de ciertas vitaminas y [address], daniela el ácido fólico, aumenta shannan el [facility]  Las vitaminas prenatales proporcionan algunas de las vitaminas y minerales adicionales que usted necesita  Las vitaminas prenatales también podrían ayudar a disminuir el riesgo de ciertos defectos de nacimiento  · Pregunte cuánto peso usted debe aumentar shannan jones embarazo  Demasiado aumento de peso o muy poco puede ser poco saludable para usted y jones bebé  · Consulte con jones médico acerca de hacer ejercicio  El ejercicio moderado puede ayudarla a mantenerse en forma  Jones médico la ayudará a planear un programa de ejercicios que sea seguro para usted shannan jones [facility]  · No fume  Si usted fuma, nunca es demasiado tarde para dejar de hacerlo  Fumar aumenta el riesgo de aborto espontáneo y otros problemas de [place] shannan jones [place]  Fumar puede causar que jones bebé nazca antes de tiempo o que pese menos al nacer  Solicite información a jones médico si usted necesita ayuda para dejar de fumar  · No consuma alcohol  El alcohol pasa de jones cuerpo al bebé a través de la placenta  Puede afectar el desarrollo del cerebro de jones bebé y provocar el síndrome de alcoholismo fetal (SAF)  FAS es un vamsi de condiciones que causan [address], de comportamiento y de crecimiento  · Consulte con jones médico antes de mega cualquier medicamento  Muchos medicamentos pueden perjudicar a reina bebé si usted los manjeet [address]  No tome ningún medicamento, vitaminas, hierbas o suplementos sin cindy consultar con reina Chelsey Nurse  use drogas ilegales o de la gaitan (daniela marihuana o cocaína) mientras está embarazada  Consejos de seguridad:   · Evite jacuzzis y saunas  No use un jacuzzi o un sauna mientras usted está embarazada, especialmente shannan el primer trimestre  [facility] y los saunas aumentan la temperatura de reina bebé y el riesgo de defectos de nacimiento  · Evite la toxoplasmosis  Louin es courtney infección causada por comer carne cruda o estar cerca del excremento de un lj infectado  Louin puede causar malformaciones congénitas, aborto espontáneo y Stevie Schein  Lávese las bartolo después de tocar carne cruda  Asegúrese de que la carne esté bethany cocida antes de comerla  Evite los huevos crudos y la [place]  Use guantes o pida que alguien la ayude a limpiar la caja de arena del lj mientras usted Lilibeth Lopez  · Consulte con reina médico acerca de viajar  El [address] cómodo para viajar es shannan el nikole trimestre  Pregunte a reina médico si usted puede viajar después de las 36 semanas  Es posible que no pueda viajar en avión después de las 36 [address]  También le puede recomendar que evite largos viajes por carretera  Programe un control con reina médico u obstetra según lo indicado:  Vaya a todas joslyn citas prenatales shannan reina embarazo  Anote joslyn preguntas para que se acuerde de hacerlas shannan joslyn visitas  © 2017 [address] Information is for End User's use only and may not be sold, redistributed or otherwise used for commercial purposes  All illustrations and images included in CareNotes® are the copyrighted property of A D A M , Inc  or Jean Peterson  Esta información es sólo para uso en educación  Reina intención no es darle un consejo médico sobre enfermedades o tratamientos  Colsulte con jones Gala Primer farmacéutico antes de seguir cualquier régimen médico para saber si es seguro y efectivo para usted  [place] y vómito en el embarazo   CUIDADO AMBULATORIO:   La náusea y el vómito en el embarazo  pueden suceder a cualquier hora del día  Estos síntomas usualmente comienzan antes de la semana 9 del embarazo y terminan para la semana 14 (nikole trimestre)  Algunas mujeres pueden tener náusea o vómito por un tiempo prolongado  Estos síntomas pueden afectar a algunas mujeres shannan el embarazo  La náusea y el vómito no dañan a jones bebé  Estos síntomas pueden dificultarle chiara actividades diarias  Busque atención médica de inmediato si:   · Usted presenta signos de deshidratación  Por ejemplo, orina de color amarillo oscuro, boca y labios resecos, piel reseca, latido cardíaco acelerado y orinar menos de lo normal     · Usted tiene dolor abdominal intenso  · Usted se siente demasiado débil o mareado daniela para ponerse de pie  · Usted nota caryl en jones vómito o en chiara deposiciones  Pregúntele a jones Tawnya Fernandez vitaminas y minerales son adecuados para usted  · Usted vomita más de 4 veces en 1 día  · Usted no ha podido retener líquidos en el estómago por más de 1 día  · Usted pierde más de 2 libras  · Usted tiene fiebre  · Chiara náuseas y vómito continúan por más de 14 semanas  · Usted tiene preguntas o inquietudes acerca de jones condición o cuidado  El tratamiento  para la náusea y el vómito en el embarazo generalmente no es necesario  Usted puede [employer] alimentos que come y en chiara actividades para ayudar a controlar chiara síntomas  Es posible que usted necesite probar varias cosas para determinar qué funciona mejor para usted  Hable con jones médico si chiara síntomas no mejoran con los cambios que se recomiendan a continuación  Es posible que usted necesite vitamina B6 y medicamento si estos cambios no ayudan o si chiara síntomas se vuelven graves     Cambios de nutrición que usted puede realizar para controlar la náusea y el vómito:   · Coma porciones pequeñas shannan el día en vez de 3 comidas con porciones grandes  Es más probable que usted tenga náusea y vómito cuando jones estómago está vacío  Consuma alimentos bajos en grasa y ricos en proteínas  Ejemplos son [place], frijoles, pavo y dejah sin mervin [place], daniela galletas saladas, cereal seco o un sandwich chico antes de [place]  · Coma galletas saladas o pan katie antes de levantarse de jones cama por la mañana  Levántese de la cama lentamente  Los movimientos repentinos podrían provocarle mareos y [place]  · Consuma alimentos blandos cuando se sienta con náuseas  Ejemplos de alimentos blandos son el pan katie, cereal seco, pasta sin Payton Hora y sims  Otros alimentos blandos incluyen a las [employer], plátanos, gelatina y pretzels  Evite los alimentos condimentados, grasosos y fritos  Evite otros alimentos que le provoquen náuseas  · [place] líquidos que contengan gengibre  [place] refresco de gengibre hecho con gengibre real o té de gengibre hecho con gengibre fresco rallado  Las [employer] o dulces de gengibre también podrían ayudar a aliviar la náusea y el vómito  · [place] líquidos entre alimentos en vez de tomarlos con los alimentos  Espere al menos 30 minutos después de comer para mega líquidos  [place] cantidades pequeñas de líquidos con frecuencia shannan el día para evitar la deshidratación  Consulte cuál es la cantidad de líquido que usted debería consumir al día  Otros cambios que usted puede realizar para controlar la náusea y el vómito:   · Evite los olores que la [place]  Los olores loraine podrían provocar que [employer] náuseas y el vómito, o podrían empeorarlo   Camine un poco, prenda un ventilador o trate de dormir con la ventana abierta para respirar aire fresco  Cuando esté cocinando, jose las ventanas para eliminar el olor que podría provocarle náuseas  · No se cepille joslyn dientes inmediatamente después de comer  si eso le provoca náuseas  · Descanse cuando lo necesite  Comience courtney actividad lentamente y vuelva a reina rutina normal conforme se empiece a sentir mejor  · Hable con reina médico acerca de las vitaminas prenatales  Las vitaminas prenatales pueden provocar náuseas a algunas mujeres  Trate de tomárselas por la noche o con un bocadillo  Si dianne cambio no le [facility], reina médico podría recomendarle un tipo de vitamina diferente  · No use ningún medicamento, vitamina o suplemento para controlar joslyn síntomas sin antes consultarlo con reina médico   Varios medicamentos pueden dañar a reina bebé que no ha nacido  · El ejercicio de ligero a moderado  podría ayudar a aliviar joslyn síntomas  También podría ayudarla a dormir mejor por la noche  Pregunte a reina médico acerca del mejor plan de ejercicio para usted  Acuda a joslyn consultas de control con reina médico según le indicaron  Anote joslyn preguntas para que se acuerde de hacerlas shannan joslyn visitas  © 2017 [facility] [facility] Information is for End User's use only and may not be sold, redistributed or otherwise used for commercial purposes  All illustrations and images included in CareNotes® are the copyrighted property of A D A M , Inc  or Jean Peterson  Esta información es sólo para uso en educación  Reina intención no es darle un consejo médico sobre enfermedades o tratamientos  Colsulte con reina Nayana Lesser farmacéutico antes de seguir cualquier régimen médico para saber si es seguro y efectivo para usted  Virus del Zika: Información para mujeres embarazadas   CUIDADO AMBULATORIO:   El virus del [facility] mosquitos son los portadores del virus del anda  El virus es transmitido a un humano a través de la picadura de un mosquito infectado   El virus también se podría transmitir de [place] Presser persona a otra por medio de la relación sexual  El virus del [facility] se podría transmitir de courtney madre al feto  [place] podría provocar defectos de nacimiento, daniela courtney falta de desarrollo cerebral  También puede causar la pérdida del embarazo  En la actualidad no hay courtney vacuna para evitar el virus del [facility]  Los signos y síntomas más comunes incluyen los siguientes:  Es posible que no presente signos ni síntomas del virus del [facility]  Si usted desarrolla signos o síntomas, podrían presentarse de repente y durar por 2 a 7 alva  Puede presentar cualquiera de los siguientes signos o síntomas:  · Fiebre o escalofríos    · Urticaria    · Dolor de gail    · Dolor muscular o articular    · Enrojecimiento o comezón en los ojos  Pregúntele a jones Cl[place]ia Green vitaminas y minerales son adecuados para usted  · Usted piensa que ha estado expuesto al virus del [facility]  · Tiene síntomas del virus del [facility]  · Usted tiene preguntas o inquietudes acerca de jones condición o cuidado  Evite las picaduras de mosquitos:  No debe viajar a un sitio donde le virus del Zika es común  Pregunte a jones médico qué lugar es seguro para viajar  Evite las picaduras de mosquito para disminuir jones riesgo de contraer la infección por el virus del Zika:  · Aplique repelente de insectos  Pregunte a jones médico cuál repelente de insectos es adecuado para usted  La mayoría de los repelentes de insectos se pueden usar sin riesgos shannan el embarazo  Siga las indicaciones en el empaque del repelente de insectos  La siguiente es courtney lista de consejos para usar el repelente de insectos:     ¨ No aplique repelente de insectos en la piel debajo de la ropa  ¨ Aplique protector solar antes de aplicar repelente de insectos  ¨ Use un repelente de insectos cada vez que tenga planeado estar al [employer]  Use un repelente todo el tiempo si usted viaja o vive en lugares de alto riesgo  Vuelva a aplicarse el repelente según las indicaciones       ¨ Aplíquese repelente de insectos todos los días por 3 semanas después de viajar a áreas de [address] riesgo  · Use courtney camisa de manga larga y pantalones  [place] le protegerá la piel de las picaduras de mosquito  · Use mallas o mosquiteros  Use un mosquitero alrededor de jones cama  Cuando viaje, escoja un lugar que tenga mosquiteros en todas las ventanas y matti  [address] [facility] y matti en jones casa  Repare las redes o mosquiteros que tengan agujeros, o compre mosquiteros o redes nuevos  · [address] matti y ventanas cerradas  En lo posible, use el aire acondicionado para mantener fresca la casa  · Aplique un repelente de insectos en la ropa y el equipo  [place] incluye las botas, pantalones, medias y carpas de campaña  Candace esto cuando acampe, escale o trabaje en el exterior  Usted también puede comprar prendas de vestir e implementos que ya vienen con el repelente de insectos  · Limpie y vacíe todos los recipientes con agua courtney vez a la semana  [address] recipientes para los [address], los baldes de agua, los wilson de desagüe de la casa, los floreros y la hilary para pájaros  Los mosquitos ponen los huevos cerca del agua  Es mejor que vacíe y restriegue estos recipientes con agua y [place]  Mantenga los recipientes de agua cubiertos o bethany cerrados en cuanto sea posible  · Use un repelente de insectos en el interior y exterior de jones casa  Use un atomizador repelente de insectos que es seguro para usar en el interior de jones casa  Coloque un aparato atomizador contra los mosquitos en el exterior de jones casa  Instale el aparato en un lugar oscuro y fresco  Consulte a jones médico dónde puede conseguir estos artículos  Siga las instrucciones que vienen con estos productos  Practique sexo seguro shannan el embarazo:  Lo siguiente disminuirá el riesgo de contraer el virus del [place]  También disminuirá el riesgo de que le pase el virus del Zika a jones bebé    · No tenga contacto sexual con un hombre o courtney rj con infección por el virus del [address] embarazada  No tenga contacto sexual con un hombre o courtney rj que hernandez estado expuestos al virus del Zika mientras usted Lilibeth Maeler  Reina jennifer puede estar en riesgo por exposición si ha viajado a courtney paulette donde existe infección de [facility]  Por sexo nos referimos al sexo oral, vaginal y anal      · Si usted decide tener relaciones sexuales shannan el [place], utilice un condón o un método de parker de látex cada vez que tenga relaciones sexuales  Use protección para todo tipo de contacto sexual con un hombre o courtney rj  [place] incluye el sexo oral, vaginal y anal  Use un condón nuevo o courtney parker de látex cada vez que tenga relaciones sexuales  Asegúrese que el condón se ajuste y esté bethany colocado  Si usted es alérgico al látex, use un producto sin base de látex daniela poliuretano  Para obtener la información más actualizada sobre el virus del Zika:  Lo que se conoce del virus del Zika está cambiando rápidamente  Obtenga la información más actualizada en:  · Centers for Disease Control and Prevention Information on [address]  [address] [facility]  Phone: [phone]  Web Address: [facility]  Acuda a joslyn consultas de control con reina médico según le indicaron  Anote joslyn preguntas para que se acuerde de hacerlas shannan joslyn visitas  © 2017 [address] Information is for End User's use only and may not be sold, redistributed or otherwise used for commercial purposes  All illustrations and images included in CareNotes® are the copyrighted property of A D A M , Inc  or Jean Peterson  Esta información es sólo para uso en educación  Reina intención no es darle un consejo médico sobre enfermedades o tratamientos  Colsulte con reina Arlet Seals farmacéutico antes de seguir cualquier régimen médico para saber si es seguro y efectivo para usted  Dieta para el embarazo   LO QUE NECESITA SABER:   ¿Qué es courtney dieta saludable shannan el [facility] Theodore Peaches saludable shannan el embarazo es un plan alimenticio que proporciona la cantidad de calorías y nutrientes que usted necesita shannan el embarazo  El cuerpo necesita calorías y nutrientes adicionales para apoyar el desarrollo del bebé  Usted necesita aumentar la cantidad correcta de peso para tener un bebé y un embarazo saludables  Los bebés que nacen con un peso saludable tienen un riesgo guillermo de ciertos problemas cardíacos al nacer y shannan jones jake  Theodore Peaches saludable podría ayudarlo a evitar que aumente mucho de [place]  El aumento excesivo de peso le podría provocar problemas shannan el Rajni Lincoln y Daina  ¿Qué debería evitar shannan el Rajni Lincoln? · Alcohol:  No bassem alcohol shannan el embarazo  El alcohol puede aumentar el riesgo de sufrir un aborto espontáneo (perder el bebé)  El bebé también podría nacer muy pequeño y Wendi Razo otros problemas de [place], daniela problemas de aprendizaje shannan jones jake  · Cafeína:  No se conoce cuales son Ethelle Piles  Limite el consumo de cafeína para evitar posibles problemas de angeles  La cafeína puede encontrase en el café, té, gaseosas, bebidas deportivas y chocolate  · Alimentos que contienen sivan:  El sivan se encuentra de [place]am natural en camilla todos los tipos de pescados y mariscos  Algunos tipos de pescados absorben niveles más altos de sivan que pueden ser nocivos para un bebé antes de nacer  Consuma solamente pescados y mariscos bajos en sivan  Coma un luis de 12 onzas a la semana de pescado o [employer] [facility] un nivel bajo de sivan  Entre éstos se [employer], el [employer] agua, el steven, el pooja y Alexanrde Liu In Pedriolo  Coma sólo 6 onzas de atún ortega por semana  El atún albacora tiene más sivan que el atún [place]  No  coma tiburón, pez marlene, caballa ni blanquillo       · Alimentos crudos o poco cocidos:  Usted no debería comer carne, aves, huevos, pescado o mariscos (camarón, rekha boo) crudos o poco cocidos  Cocine los alimentos sobrantes y listos para comer daniela los hot dogs hasta que estén bethany calientes  · Alimentos sin pasteurizar:  Los alimentos sin pasteurizar son aquéllos que no pasaron por el proceso de calentamiento (pasteurización) que destruye la bacteria  Usted no debe beber [place], [address] que no haya sido pasteurizado  Estos [place] [employer], feta, Camembert, blue y [place]  ¿Qué alimentos se pueden comer shannan el embarazo? Consuma courtney variedad de alimentos de cada tana de los grupos que se enumeran abajo  El [place]'s dirá cuántas porciones de cada vamsi usted debería comer diariamente para obtener las calorías suficientes  La cantidad de calorías que usted necesita depende de la actividad diaria, el peso antes del [facility] y [place] actual  Los médicos dividen el embarazo en 3 periodos de tiempo que se conocen daniela trimestres  En el primer trimestre, usualmente usted no necesita calorías adicionales  En el nikole y tercer trimestre, la mayoría de las mujeres deberían consumir alrededor de 300 calorías adicionales cada día  · Frutas y verduras:  La mitad del plato debería contener frutas y verduras  ¨ Frutas:  Seleccione frutas frescas, enlatadas o secas tan seguido daniela sea posible  ¨ 1 taza de [place], picada, cocida o enlatada (enlatada en almíbar o 100% de [facility])    ¨ Un durazno, naranja o plátano alex    ¨ ½ taza de fruta seca    ¨ 1 taza de jugo de fruta    ¨ Verduras:  Consuma mas verduras de color joao oscuro, myers o anaranjado  Los vegetales joao oscuro incluyen la brócoli, [facility] y repollo joao  Ejemplos de verduras de color anaranjado y myers son las zanahorias, el camote, la calabaza de invierno, naranjas y chile myers      ¨ 1 taza de vegetales cocidos o crudos    ¨ 1 taza de jugo de verduras    ¨ 2 tazas de hojas verdes crudas    · Granos:  La mitad de los granos que consume cada día deberían ser granos enteros  ¨ Granos integrales      ¨ ½ taza de arroz integral o elbert cocidos    ¨ 1 taza (1 onza) de cereal seco de grano entero    ¨ 1 rebanada de pan de 100% de grano entero o de reyes    ¨ 3 tazas de palomitas de maíz    ¨ Otros granos:      ¨ ½ taza de arroz ortega o pasta cocidos    ¨ ½ de un pan inglés    ¨ 1 tortilla pequeña de harina o de maíz    ¨ 1 rosquilla pequeña    · Productos lácteos:  Elija productos lácteos sin grasa o bajos en grasa:    ¨ 1½ onzas de queso firme (allyn Vanegas, rufino)     ¨ 1 taza (8 onzas) de leche o yogurt sin o bajo en grasa    ¨ 1 taza de yogurt o pudín congelado bajo en grasa    · Carne y otras foster de proteínas:  Elija kirk magras y de ave  Hornee, ase y cocine la carne a la pallavi en vez de freírla  Incluya courtney variedad de mariscos en lugar de algunas kirk y aves cada semana  Consuma courtney variedad de alimentos con proteínas:    ¨ ½ onza de eben secos (12 almendras, 24 pistachos, 7 mitades de nueces) o 1 cucharada de crema de cacahuate (1 onza)    ¨ ¼ de taza de soya tofu o tempeh (1 onza)    ¨ 1 huevo    ¨ ¼ de taza de frijoles, chícharos o lentejas cocidos (1 onza)    ¨ 1 pechuga de dejah pequeña o 1 trucha pequeña (alrededor de 3 onzas)    ¨ 1 trozo de salmón (4 a 6 onzas)    ¨ 1 hamburguesa pequeña de carne magra (2 a 3 onzas)    · Grasas:  Limite las grasas saturadas, trans y el colesterol  Estas grasas no son saludables y se encuentran en la [facility] Hospital[facility], la [place], la margarina de herb y en la grasa animal  Elija grasas saludables daniela la poliinsaturada y la monoinsaturada:     ¨ 1 cucharada de aceite de Vasiliy miguel [place], [place] o de soya    ¨ 1 cucharada de margarinurszula mixon    ¨ 1 cucharadita de [facility]    ¨ 2 cucharadas de aderezo para ensaladas    ¨ ½ de un aguacate  ¿Qué suplementos vitamínicos y minerales podría necesitar? El médico le indicará si usted necesita un suplemento y qué tipo debería mega   Hable con jones médico antes de mega cualquier otra clase de suplemento, incluyendo los herbales (naturales)  · Vitaminas prenatales:  Consuma courtney variedad de alimentos saludables, aun si usted está tomando vitaminas prenatales  Si olvida mega la vitamina, no se tome el doble al siguiente día  · Ácido fólico:  Usted necesita al menos 818 mcg de ácido fólico por día antes de embarazarse  El ácido fólico ayuda a formar el cerebro y la médula holder del bebé en el comienzo del [facility]  Shannan el [facility], reina necesidad diaria de ácido fólico aumenta a alrededor de 600 mcg  Obtenga diariamente ácido fólico, consumiendo frutas y jugos cítricos, verduras de hojas verdes, hígado o frijoles secos  El ácido fólico también se agrega a ciertos [employer] cereales para el desayuno, los productos de sims, las harinas y las pastas  · Kendall:  El kendall es un mineral que el cuerpo necesita para producir hemoglobina, que es courtney parte de los glóbulos rojos  La hemoglobina ayuda a que la caryl transporte oxígeno de los pulmones al cassie del cuerpo  Los alimentos que son Humberto Babak buena hilary de kendall son la carne, la carne de ave, pescado, frijoles, espinaca y cereales y panes fortificados  Reina cuerpo absorberá el kendall de foster de carne, si tiene courtney hilary de vitamina C al [employer]  Nissa té y café lejos de alimentos fortificados con kendall y suplementos de kendall  Usted necesita alrededor de 30 miligramos de kendall cada día shannan el embarazo  · Calcio y vitamina D:  Las mujeres que no consumen productos lácteos pueden necesitar un suplemento de calcio y vitamina D  Hable con reina médico sobre suplementos de calcio si no come regularmente buenas foster de calcio  La cantidad de calcio que usted necesita es de 1,300 mg si tiene entre 14 y 25 años de edad y de 1,000 mg si tiene entre 23 y 48 años de edad  ¿Qué cambios en la dieta podrían ayudar si tengo malestar matutino?   El malestar matutino es común [employer] [place] [employer] primeros meses de embarazo  Usted podría sentirse con náuseas y vomitar varias veces al día  Para mejorar los síntomas del malestar matutino, coma poco y varias veces en vez de 3 comidas grandes  Los [employer] en carbohidratos daniela las galletas saladas, el pan katie y la pasta podrían ser mas fáciles de comer  Nissa líquidos [employer] comidas en vez de hacerlo con las comidas  ¿Qué cambios en la dieta podrían disminuir el estreñimiento? Cindy dieta vic en fibra puede mejorar los síntomas de estreñimiento  Los cereales integrales para desayunar, los panes integrales y los jugos de [place] pasa son altos en [place]  Baeza Hanly y verduras crudas, así daniela los frijoles cocidos también son Sydnee Zakiya buena hilary de [place]  También podría ayudar el aumentar el consumo de líquidos y realizar actividad física regularmente  Consulte con jones médico antes de empezar un régimen de ejercicios  ¿Qué cambios en la dieta podrían disminuir la [place]? Para mejorar los síntomas de la [place], no se acueste después de comer  Cuando se acueste, duerma con la gail un poco elevada  Coma poco y con frecuencia, en vez de 3 comidas grandes  También podría ser de [employer] [facility] evitar la cafeína, el chocolate y las comidas condimentadas  ¿Cómo puedo obtener suficiente calcio si no puedo tolerar los productos lácteos? Si usted no puede beber [place] o consumir productos lácteos, trate la [place] sin lactosa o baja en lactosa, o la leche de soya fortificada con calcio  Pregunte a jones médico acerca de píldoras que pueda mega para ayudar a digerir los productos lácteos  Consuma otras bebidas y comidas que estén fortificadas con calcio, daniela el [place]  ¿Qué otras pautas saludables ayan seguir? · Rumalda Carina y veganos:  Si usted es vegetariana o vegana, consuma suficiente proteína, vitamina B12 y kendall shannan el embarazo   Algunas foster de estos nutrientes que no son carne, son los cereales fortificados, la [place] de [place], productos de soya (tofu y [place] de soya), nueces, granos y legumbres  Estos nutrientes también se [employer] y los productos lácteos  · Antojos:  Usted podría tener antojos de ciertos alimentos shannan el Lauren Ross  Los alimentos que son altos en calorías, grasa y azúcar, no deben reemplazar a los alimentos que son saludables  Algunas mujeres tienen antojos por sustancias inusuales daniela el Wayne solis almidón para ropa, hielo y [place] S  Esta condición se conoce daniela pica  [place] podría conllevar a problemas de angeles daniela anemia y provocar otros [address] [place] [address] Pkwy  ¿Cuándo ayan comunicarme con mi médico?   · Usted pierde peso sin proponérselo  · Tiene antojos por sustancias daniela el Wayne solis almidón para ropa o hielo  · Usted tiene preguntas o inquietudes acerca de garnica condición o cuidado  ACUERDOS SOBRE GARNICA CUIDADO:   Usted tiene el derecho de ayudar a planear garnica cuidado  Discuta chiara opciones de tratamiento con chiara médicos para decidir el cuidado que usted desea recibir  Usted siempre tiene el derecho de rechazar el tratamiento  Esta información es sólo para uso en educación  Garnica intención no es darle un consejo médico sobre enfermedades o tratamientos  Colsulte con garnica [place] farmacéutico antes de seguir cualquier régimen médico para saber si es seguro y efectivo para usted  © 2017 [address] Information is for End User's use only and may not be sold, redistributed or otherwise used for commercial purposes  All illustrations and images included in CareNotes® are the copyrighted property of A D A M , Inc  or Jean Peterson  Embarazo de la semana 7 a la 10   CUIDADO AMBULATORIO:   Qué cambios están ocurriendo en garnica cuerpo:  La hormonas del embarazo podrían provocar que garnica cuerpo pase por varios cambios shannan esta etapa del Lauren Ross  Es posible que usted se sienta más cansado de lo normal y que tenga cambios de humor, náuseas y vómitos, y luz de [place]   Chiara senos podrían sentirse sensibles e inflamados y usted podría orinar con más frecuencia  Busque atención médica de inmediato si:   · Usted tiene dolor o cólicos en el abdomen o la parte baja de la espalda  · Usted tiene sangrado vaginal abundante o coágulos  · Le sale un material que parece tejido o coágulos grandes  Recolecte el material y tráigalo con usted  Pregúntele a reina Tana Tony vitaminas y minerales son adecuados para usted  · Usted tiene un sangrado leve  · Usted tiene escalofríos o fiebre  · Usted tiene comezón, ardor o dolor vaginal      · Usted tiene courtney secreción vaginal amarillenta, verdosa, derek o de [employer]  · Usted tiene dolor o ardor al Kenia Pong, orina menos de lo habitual o tiene [place] rosada o sanguinolenta  · Usted tiene preguntas o inquietudes acerca de reina condición o cuidado  Cómo cuidarse en esta etapa de reina embarazo:   · Controle la náusea y el vómito  Evite los alimentos grasosos y picantes  Coma comidas pequeñas shannan el día en vez de porciones grandes  El jengibre puede ayudar a [employer]  Consulte con reina médico acerca de otras formas para disminuir las náuseas y el vómito  · Consuma alimentos saludables y variados  Alimentos saludables incluyen frutas, verduras, panes de cindy integral, alimentos lácteos bajos en grasa, frijoles, kirk magras y pescado  [place] líquidos daniela se le haya indicado  Pregunte cuánto líquido debe mega cada día y cuáles líquidos son los más adecuados para usted  Limite el consumo de cafeína a menos de [address]  Limite el consumo de pescado a 2 porciones cada semana  Escoja pescado con concentraciones bajas de sivan daniela atún al natural enlatado, camarón, salmón, bacalao o tilapia  No  coma pescado con concentraciones altas de sivan daniela pez marlene, diana garcia, leonel mckinney y olive  · [address] [place]    Reina necesidad de ciertas vitaminas y Moses Alas el ácido fólico, aumenta shannan el [facility]  Las vitaminas prenatales proporcionan algunas de las vitaminas y minerales adicionales que usted necesita  Las vitaminas prenatales también podrían ayudar a disminuir el riesgo de ciertos defectos de nacimiento  · Pregunte cuánto peso usted debería aumentar cada mes  Demasiado aumento de peso o muy poco puede ser poco saludable para usted y jones bebé  · No fume  Si usted fuma, nunca es demasiado tarde para dejar de hacerlo  Fumar aumenta el riesgo de aborto espontáneo y otros problemas de angeles shannan jones [facility]  Fumar puede causar que jones bebé nazca antes de tiempo o que pese menos al nacer  Solicite información a jones médico si usted necesita ayuda para dejar de fumar  · No consuma alcohol  El alcohol pasa de jones cuerpo al bebé a través de la placenta  Puede afectar el desarrollo del cerebro de jones bebé y provocar el síndrome de alcoholismo fetal (SAF)  FAS es un vamsi de condiciones que causan [address], de comportamiento y de crecimiento  · Consulte con jones médico antes de mega cualquier medicamento  Muchos medicamentos pueden perjudicar a jones bebé si usted los manjeet [address] [place] [address]  No tome ningún medicamento, vitaminas, hierbas o suplementos sin cindy consultar con jones Laverne Sella  use drogas ilegales o de la gaitan (daniela marihuana o cocaína) mientras está embarazada  Consejos de seguridad shannan el embarazo:   · Evite jacuzzis y saunas  No use un jacuzzi o un sauna mientras usted está embarazada, especialmente shannan el primer trimestre  Los [facility] y los saunas aumentan la temperatura de jones bebé y el riesgo de defectos de nacimiento  · Evite la toxoplasmosis  [place] es courtney infección causada por comer carne cruda o estar cerca del excremento de un lj infectado  [place] puede causar malformaciones congénitas, aborto espontáneo y Stevie Schein  Lávese las bartolo después de tocar carne cruda   Asegúrese de que la carne esté bethany cocida antes de comerla  Evite los huevos crudos y la Gelacio Davina  Use guantes o pida que alguien la ayude a limpiar la caja de arena del lj mientras usted Joelene Dyer  Cambios que están ocurriendo con jones bebé:  Para las 10 semanas, jones bebé medirá alrededor de 2 ½ pulgadas desde la aleyda hasta la rabadilla (parte inferior o cóccix del bebé)  Jones bebé pesa alrededor de ½ onza  Los [employer] del cuerpo, daniela el cerebro, corazón y pulmones se están formando  Las características faciales de jones bebé también están comenzando a formarse  Lo que necesita saber acerca del cuidado prenatal:  El cuidado prenatal se trata de courtney serie de visitas con jones médico a lo jose del embarazo  Shannan las primeras 28 semanas de jones neptali Monge tendrá citas mensuales con jones médico  El cuidado prenatal puede ayudar a evitar problemas shannan el [facility] y Daina  Jones médico le hará preguntas acerca de jones angeles y de cualquier embarazo previo que usted haya tenido  Él también le preguntará acerca de cualquier medicamento que esté tomando  Es posible que también necesite alguno de los siguientes tratamientos:  · Courtney prueba de [place]  se realiza para revisar si jones sumanth uterino tiene células anormales  El sumanth uterino es la apertura angosta que está en la parte inferior de [place]  El sumanth uterino se junta con la parte superior de la vagina  · Un examen pélvico  le permite a jones médico observar jones sumanth uterino (la parte inferior de jones [place])  Jones médico utiliza un espéculo para abrir jones vagina suavemente  Él examinará el tamaño y forma de jones útero  · Los análisis de caryl:  podrían realizarse para revisar signos de anemia o el tipo de [place]  Jones médico también podría ordenarle otros exámenes de caryl para revisar si usted es inmune a ciertas enfermedades daniela la Hepatitis B  También podría recomendarle un examen del VIH      · Análisis de orina  también podrían realizarse para revisar si hay signos de infección  · Jones presión arterial y peso  será revisado  © 2017 [address] Information is for End User's use only and may not be sold, redistributed or otherwise used for commercial purposes  All illustrations and images included in CareNotes® are the copyrighted property of A D A M , Inc  or Jean Peterson  Esta información es sólo para uso en educación  Jones intención no es darle un consejo médico sobre enfermedades o tratamientos  Colsulte con jones Arlet Seals farmacéutico antes de seguir cualquier régimen médico para saber si es seguro y efectivo para usted  El embarazo de la semana 11 a la 14   CUIDADO AMBULATORIO:   Qué cambios están ocurriendo en jones cuerpo: Ahora usted está al término del primer trimestre y entrando al nikole trimestre  Los [employer] matutinos por lo general desaparecen para [address] [employer]  Es posible que usted tenga otros síntomas daniela fatiga, orinar con frecuencia y luz de [place]  Usted podría bari [employer] 2 a 4 libras hasta ahora  Busque atención médica de inmediato si:   · Usted tiene dolor o cólicos en el abdomen o la parte baja de la espalda  · Usted tiene sangrado vaginal abundante o coágulos  · Le sale un material que parece tejido o coágulos grandes  Recolecte el material y tráigalo con usted  Pregúntele a jones Yola Gault vitaminas y minerales son adecuados para usted  · Usted no puede retener alimentos ni líquidos y está perdiendo [place]  · Usted tiene un sangrado leve  · Usted tiene escalofríos o fiebre  · Usted tiene comezón, ardor o dolor vaginal      · Usted tiene courtney secreción vaginal amarillenta, verdosa, derek o de [employer]  · Usted tiene dolor o ardor al Lajune Gentle, orina menos de lo habitual o tiene [facility] rosada o sanguinolenta  · Usted tiene preguntas o inquietudes acerca de jones condición o cuidado  Cómo cuidarse en esta etapa de jones embarazo:   · Descanse lo suficiente    Es posible que usted se sienta más cansada de lo normal  Usted podría necesitar mega siestas o acostarse más temprano  · Controle la náusea y el vómito  Evite los alimentos grasosos y picantes  Coma comidas pequeñas shannan el día en vez de porciones grandes  El jengibre puede ayudar a [employer]  Consulte con jones médico acerca de otras formas para disminuir las náuseas y el vómito  · Consuma alimentos saludables y variados  Alimentos saludables incluyen frutas, verduras, panes de cindy integral, alimentos lácteos bajos en grasa, frijoles, kirk magras y pescado  [place] líquidos daniela se le haya indicado  Pregunte cuánto líquido debe mega cada día y cuáles líquidos son los más adecuados para usted  Limite el consumo de cafeína a menos de [address]  Limite el consumo de pescado a 2 porciones cada semana  Escoja pescado con concentraciones bajas de sivan daniela atún al natural enlatado, camarón, salmón, bacalao o tilapia  No  coma pescado con concentraciones altas de sivan daniela pez marlene, caballa gigante, pargo rayado y tiburón  · [id number]  Jones necesidad de ciertas vitaminas y [address] [facility], daniela el ácido fólico, aumenta shannan el [facility]  Las vitaminas prenatales proporcionan algunas de las vitaminas y minerales adicionales que usted necesita  Las vitaminas prenatales también podrían ayudar a disminuir el riesgo de ciertos defectos de nacimiento  · No fume  Si usted fuma, nunca es demasiado tarde para dejar de hacerlo  Fumar aumenta el riesgo de aborto espontáneo y otros problemas de angeles shannan jones [facility]  Fumar puede causar que jones bebé nazca antes de tiempo o que pese menos al nacer  Solicite información a jones médico si usted necesita ayuda para dejar de fumar  · No consuma alcohol  El alcohol pasa de jones cuerpo al bebé a través de la placenta   Puede afectar el desarrollo del cerebro de jones bebé y provocar el síndrome de alcoholismo fetal (SAF)  FAS es un vamsi de condiciones que causan [address], de comportamiento y de crecimiento  · Consulte con reina médico antes de mega cualquier medicamento  Muchos medicamentos pueden perjudicar a reina bebé si usted los manjeet [address]  No tome ningún medicamento, vitaminas, hierbas o suplementos sin cindy consultar con reina Peng Diesel  use drogas ilegales o de la gaitan (daniela marihuana o cocaína) mientras está embarazada  Consejos de seguridad james el embarazo:   · Evite jacuzzis y saunas  No use un jacuzzi o un sauna mientras usted está embarazada, especialmente james el primer trimestre  Los [facility] y los saunas aumentan la temperatura de reina bebé y el riesgo de defectos de nacimiento  · Evite la toxoplasmosis  [place] es courtney infección causada por comer carne cruda o estar cerca del excremento de un lj infectado  [place] puede causar malformaciones congénitas, aborto espontáneo y Stevie Schein  Lávese las bartolo después de tocar carne cruda  Asegúrese de que la carne esté bethany cocida antes de comerla  Evite los huevos crudos y la Delayne Pupa  Use guantes o pida que alguien la ayude a limpiar la caja de arena del lj mientras usted Andrews Adriana  Cambios que están ocurriendo con reina bebé: Reina bebé tiene completamente formadas las uñas de joslyn bartolo y pies  Ahora reina latido se puede escuchar  Pregunte a reina médico si usted puede escuchar el latido cardíaco de reina bebé  Para la semana 14, reina bebé mide más de 4 pulgadas desde la punta de la gail hasta la rabadilla (parte inferior del bebé)  Reina bebé pesa más de 3 onzas  Lo que necesita saber acerca del cuidado prenatal:  James las primeras 29 semanas de reina embarazo, usted tendrá citas mensuales con reina médico  El cuidado prenatal puede ayudar a evitar problemas james el Jerelene Avalos y Daina  Reina médico le revisará reina presión arterial y [place]   Es posible que también necesite alguno de los siguientes tratamientos:  · Un examen de orina  también podría realizarse para revisarle el azúcar y la proteína  Estas son señales de diabetes gestacional o de infección  · Se le pueden ofrecer  pruebas de detección de trastornos genéticos  Estos exámenes de detección revisan el riesgo de reina bebé de trastornos genéticos daniela el síndrome de [place]  Los exámenes de detección incluyen un examen de caryl y un ultrasonido  · El ritmo cardíaco de reina bebé  será revisado  © 2017 26[address] [employer] Stevens Information is for End User's use only and may not be sold, redistributed or otherwise used for commercial purposes  All illustrations and images included in CareNotes® are the copyrighted property of A D A M , Inc  or Jean Peterson  Esta información es sólo para uso en educación  Reina intención no es darle un consejo médico sobre enfermedades o tratamientos  Colsulte con reina Mag Poncho farmacéutico antes de seguir cualquier régimen médico para saber si es seguro y efectivo para usted  Vacuna de refuerzo contra la Difteria/tos ferina/tétanos (Por inyección)   Protege contra las infecciones causadas por el tétanos (trismo), la difteria o la pertusis (tos [place])  Esta es courtney vacuna de refuerzo  Alexandru(s) : Adacel, Boostrix   Existen muchas otras marcas de Domingo  Dianne medicamento no debe ser usado cuando:   Usted no debe recibir esta vacuna si alguna vez ha tenido courtney reacción alérgica a la vacuna contra el tétanos, la difteria o tos ferina por separado o en combinación  Usted no debe recibir esta vacuna si ha tenido convulsiones, cambios del Toribio mental o cualquier otra reacción grave dentro de los 7 días de bari recibido courtney vacuna contra la tos [place]  Forma de usar dianne medicamento:   Inyectable  · Lala Del enfermera u otro médico le administrará dianne medicamento  · Reina médico le recetará reina dosis exacta y le indicará la frecuencia con la que debe administrarse   Dianne medicamento se administra mediante courtney inyección en tana de joslyn músculos  · Usted podría recibir otras vacunas al mismo tiempo que reciba Pamela, kalyn en courtney paulette distinta del cuerpo  Usted debe recibir las instrucciones para el paciente para todas las vacunas  Coméntele a jones médico o enfermera cualquier pregunta que tenga al respecto  · Nadine y siga las instrucciones para el paciente que vienen con el medicamento  Hable con jones médico o farmacéutico si tiene alguna pregunta  Medicamentos y [employer] que debe evitar:   Consulte con jones médico o farmacéutico antes de usar cualquier medicamento, incluyendo los que compra sin receta médica, las vitaminas y los productos herbales  · Asegúrese de informarle a jones médico si usted está recibiendo un tratamiento o medicamento que debilita jones sistema inmunológico  Ésto incluye la radioterapia, medicamentos esteroides (daniela dexametasona, hidrocortisona, metilprednisolona, prednisolona, prednisona, Medrol®), o medicamentos contra el cáncer  Precauciones shannan el uso de dianne medicamento:   · Asegúrese que jones médico sepa si usted está embarazada o lactando o sufre de epilepsia, un sistema inmunológico débil o un antecedente de un derrame cerebral  Informe a jones médico si usted está enfermo o tiene fiebre  · Avísele a jones médico acerca de cualquier reacción que usted tenga después de bari recibido courtney vacuna  Ésto incluye desmayos, convulsiones, courtney fiebre que sobrepasa los 40 5º C (105° F), o enrojecimiento o inflamación severa donde se le aplicó la inyección  Dígale a jones médico si usted tiene antecedentes del síndrome de Guillain-Barré después de bari recibido courtney vacuna antitetánica  · Llame a jones médico inmediatamente si usted se desmaya o tiene [employer] vista, entumecimiento o cosquilleo en joslyn brazos, bartolo o pies o sufre courtney convulsión después de recibir esta vacuna  · Informe a jones médico si tiene courtney alergia al látex   [place] [employer] [employer] pueden que contengan caucho de látex natural seco   · Esta vacuna no sirve para tratar [employer]  Si usted tiene Pitney Jodi de difteria, tétanos, o tos ferina, necesitará un medicamento para tratar la infección  Efectos secundarios que pueden presentarse shannan el uso de dianne medicamento:   Consulte inmediatamente con el médico si nota cualquiera de estos efectos secundarios:  · Reacción alérgica: Comez[place] o ronchas, hinchazón del edna o las bartolo, hinchazón u hormigueo en la boca o garganta, opresión en el pecho, dificultad para respirar  · Cambios en la visión  · Fiebre por encima de los 39 4 grados C (105 grados F)  · Desvanecimientos o desmayos  · Pérdida de la sensación, hormigueo o ardor en las bartolo, los brazos, las piernas o los pies  · Convulsiones  · TXU Darcie entumecimiento o debilidad en joslyn brazos o piernas  · Dolor intenso, enrojecimiento o inflamación donde le aplicaron la inyección  Consulte con el médico si nota los siguientes efectos secundarios menos graves:   · Dolor de gail  · Dolor moderado, enrojecimiento o inflamación donde le aplicaron la inyección  · [place], vómito, diarrea o dolor de estómago  · Cansancio  Consulte con el médico si nota otros efectos secundarios que isma son causados por dianne medicamento  Llame a reina médico para consultarle Ed Webster puede notificar joslyn efectos secundarios al FDA al [phone]  © 2017 2600 [employer] Stevens Information is for End User's use only and may not be sold, redistributed or otherwise used for commercial purposes  Esta información es sólo para uso en educación  Reina intención no es darle un consejo médico sobre enfermedades o tratamientos  Colsulte con reina Gaylyn Harsh farmacéutico antes de seguir cualquier régimen médico para saber si es seguro y efectivo para usted         Vacuna contra la gripe   CUIDADO AMBULATORIO:   La vacuna contra la influenza  es courtney inyección que se aplica para ayudar en la prevención de la influenza (gripe)  La influenza es causada por un virus  El virus se propaga de persona a persona por medio de la tos y los estornudos  Varios tipos de virus causan la influenza  Debido a que los virus [place] con el [place], es necesaria la producción de nuevas vacunas cada año  La vacuna comienza la protección contra la influenza aproximadamente 2 semanas después de recibirla  La vacuna antigripal suele inyectarse en el brazo  Podría aplicarse en reina muslo  Es posible que le administren courtney vacuna hecha con virus débiles o muertos  Llame al 911 en shelton de presentar lo siguiente:   · Reina boca y garganta están inflamadas  · Usted tiene sibilancias o dificultad para respirar  · Usted tiene dolor en el pecho o reina corazón está latiendo más rápido de lo que es normal para usted  · Usted siente que se va a desmayar  Busque atención médica de inmediato si:   · Reina edna está myers o inflamado  · Usted tiene urticaria que se propaga por todo reina cuerpo  · Usted se siente débil o mareado  Pregúntele a reina Liliana Patten vitaminas y minerales son adecuados para usted  · Usted tiene [employer], enrojecimiento o inflamación alrededor del área donde le aplicaron la inyección  · Usted tiene preguntas o inquietudes sobre la vacuna contra la influenza  Cuándo ponerse la vacuna contra la influenza:  La vacuna antigripal se ofrece cada año empezando en septiembre u octubre  Se recomienda ser vacunado contra la gripe tan pronto esté disponible  Los W [employer] 6 meses y 6 años de edad deben recibir 2 dosis shannan el primer año después de bari recibido la vacuna  Las 2 dosis deben recibirse con courtney diferencia de 4 semanas daniela mínimo  Es mejor si se aplica el mismo tipo de vacuna ambas veces  Luego, el gloria puede recibir 1 dosis [employer]  Los niños de 9 años o mayores deben recibir 1 dosis [employer]          Quiénes deben recibir la vacuna contra la influenza:   · Bebés de 6 meses o más    · Cualquier adulto saludable a quien le gustaría reducir el riesgo de contraer la gripe    · Cualquier persona que vive con, o provee cuidado a niños menores de 5 años de edad     · Personal en el dallin de la angeles    · Cualquier persona que viva en centros de convalecencia de jose plazo    · Cualquier persona que sufra de problemas de angeles crónicos, daniela asma, diabetes, o trastornos en la caryl    · Cualquier persona con un sistema inmunitario débil    · Mujeres embarazadas o que están planificando quedar embarazadas shannan la temporada de la gripe  An [place] no deben recibir la vacuna contra la influenza:  Si tiene alergia a los [place], pregúntele a jones médico si es seguro recibir la inyección contra la influenza  Un médico deberá controlarlo de cerca mientras recibe la vacuna y shannan courtney hora o más tras haberla recibido  An [place] no deben recibir la vacuna contra la influenza:  · Bebés menores de 6 meses     · Cualquier persona que haya sufrido courtney reacción alérgica a la vacuna contra la gripe    · Cualquier persona que esté enferma o tenga fiebre    · Cualquier persona que haya recibido un diagnóstico del síndrome de Guillain-[place] dentro de las primeras 6 semanas de bari recibido courtney vacuna contra la gripe    · Cualquier persona alérgica al timerosal (sivan)  Los riesgos de la vacuna contra la gripe:  La vacuna contra la influenza podría causar síntomas leves, daniela fiebre, dolor de [place] y luz musculares  También es posible que provoque sensibilidad o enrojecimiento de leve a moderado en el área donde le aplicaron la inyección  El aerosol nasal podría causar fiebre, congestión o flujo nasal, dolor de gail, luz musculares o vómito  Usted aún podría contraer la gripe después de recibir la vacuna contra la influenza  Si usted es alérgico a los [place], pregunte acerca de courtney vacuna sin huevo  Usted podría tener courtney reacción alérgica a la vacuna  [place] puede poner en peligro jones jake     Acuda a joslyn consultas de control con jones médico según le indicaron  Anote ojslyn preguntas para que se acuerde de hacerlas shannan joslyn visitas  © 2017 2600 Ziggy Stevens Information is for End User's use only and may not be sold, redistributed or otherwise used for commercial purposes  All illustrations and images included in CareNotes® are the copyrighted property of A D A M , Inc  or Jean Peterson  Esta información es sólo para uso en educación  Reina intención no es darle un consejo médico sobre enfermedades o tratamientos  Colsulte con reina Debby Bjornstad farmacéutico antes de seguir cualquier régimen médico para saber si es seguro y efectivo para usted  TDAP:  Vacuna de refuerzo contra la Difteria/tos ferina/tétanos (Por inyección)   Protege contra las infecciones causadas por el tétanos (trismo), la difteria o la pertusis (tos [place])  Esta es courtney vacuna de refuerzo  Alexandru(s) : Adacel, Boostrix   Existen muchas otras marcas de Domingo  Dianne medicamento no debe ser usado cuando:   Usted no debe recibir esta vacuna si alguna vez ha tenido courtney reacción alérgica a la vacuna contra el tétanos, la difteria o tos ferina por separado o en combinación  Usted no debe recibir esta vacuna si ha tenido convulsiones, cambios del Toribio mental o cualquier otra reacción grave dentro de los 7 días de bari recibido courtney vacuna contra la tos [place]  Forma de usar dianne medicamento:   Inyectable  · [place] enfermera u otro médico le administrará dianne medicamento  · Reina médico le recetará reina dosis exacta y le indicará la frecuencia con la que debe administrarse  Dianne medicamento se administra mediante courtney inyección en tana de joslyn músculos  · Usted podría recibir otras vacunas al mismo tiempo que reciba [place], kalyn en courtney paulette distinta del cuerpo  Usted debe recibir las instrucciones para el paciente para todas las vacunas  Coméntele a reina médico o enfermera cualquier pregunta que tenga al respecto    · Nadine y siga las instrucciones para el paciente que vienen con el medicamento  Hable con jones médico o farmacéutico si tiene alguna pregunta  Medicamentos y [employer] que debe evitar:   Consulte con jones médico o farmacéutico antes de usar cualquier medicamento, incluyendo los que compra sin receta médica, las vitaminas y los productos herbales  · Asegúrese de informarle a jones médico si usted está recibiendo un tratamiento o medicamento que debilita jones sistema inmunológico  Ésto incluye la radioterapia, medicamentos esteroides (daniela dexametasona, hidrocortisona, metilprednisolona, prednisolona, prednisona, Medrol®), o medicamentos contra el cáncer  Precauciones shannan el uso de dianne medicamento:   · Asegúrese que jones médico sepa si usted está embarazada o lactando o sufre de epilepsia, un sistema inmunológico débil o un antecedente de un derrame cerebral  Informe a jones médico si usted está enfermo o tiene fiebre  · Avísele a jones médico acerca de cualquier reacción que usted tenga después de bari recibido courtney vacuna  Ésto incluye desmayos, convulsiones, courtney fiebre que sobrepasa los 40 5º C (105° F), o enrojecimiento o inflamación severa donde se le aplicó la inyección  Dígale a jones médico si usted tiene antecedentes del síndrome de Guillain-Barré después de bari recibido courtney vacuna antitetánica  · Llame a jones médico inmediatamente si usted se desmaya o tiene [employer] vista, entumecimiento o cosquilleo en joslyn brazos, bartolo o pies o sufre courtney convulsión después de recibir esta vacuna  · Informe a jones médico si tiene courtney alergia al látex  Las jeringas pueden que contengan caucho de látex natural seco   · Esta vacuna no sirve para tratar [employer]  Si usted tiene Pitney Jodi de difteria, tétanos, o tos ferina, necesitará un medicamento para tratar la infección    Efectos secundarios que pueden presentarse shannan el uso de dianne medicamento:   Consulte inmediatamente con el médico si nota cualquiera de estos efectos secundarios:  · Reacción alérgica: Everton Loser o ronchas, hinchazón del edna o las [address], hinchazón u hormigueo en la boca o garganta, opresión en el pecho, dificultad para respirar  · Cambios en la visión  · Fiebre por encima de los 39 4 grados C (105 grados F)  · Desvanecimientos o desmayos  · Pérdida de la sensación, hormigueo o ardor en las bartolo, los brazos, las piernas o los pies  · Convulsiones  · TXU Darcie entumecimiento o debilidad en joslyn brazos o piernas  · Dolor intenso, enrojecimiento o inflamación donde le aplicaron la inyección  Consulte con el médico si nota los siguientes efectos secundarios menos graves:   · Dolor de gail  · Dolor moderado, enrojecimiento o inflamación donde le aplicaron la inyección  · [employer], vómito, diarrea o dolor de estómago  · Cansancio  Consulte con el médico si nota otros efectos secundarios que isma son causados por dianne medicamento  Llame a reina médico para consultarle Ed Webster puede notificar joslyn efectos secundarios al FDA al [phone]  © 2017 2600 Ziggy Stevens Information is for End User's use only and may not be sold, redistributed or otherwise used for commercial purposes  Esta información es sólo para uso en educación  Reina intención no es darle un consejo médico sobre enfermedades o tratamientos  Colsulte con robert Chapin farmacéutico antes de seguir cualquier régimen médico para saber si es seguro y efectivo para usmegan

## 2019-01-29 NOTE — LETTER
[facility] Letter    Bebeto Grey  1995  [address] [facility]       01/29/19          Bebeto Grey is a patient and under our care in our office  Hill Mohan's Estimated Date of Delivery: 7/3/2019  Any questions or concerns feel free to contact our office       Thank you,    [address] [facility],[address]  [address] [place] [address]/Adwoa Hendrix 15  [address] [facility]/Bernadette Narayanan [facility] [address] [place] [address]/[address]  [phone]

## 2019-01-30 LAB
LAB AP GYN PRIMARY INTERPRETATION: NORMAL
Lab: NORMAL

## 2019-02-04 ENCOUNTER — APPOINTMENT (OUTPATIENT)
Dept: LAB | Facility: HOSPITAL | Age: 24
End: 2019-02-04
Payer: COMMERCIAL

## 2019-02-04 DIAGNOSIS — Z3A.17 17 WEEKS GESTATION OF PREGNANCY: ICD-10-CM

## 2019-02-04 LAB
ABO GROUP BLD: NORMAL
BACTERIA UR QL AUTO: ABNORMAL /HPF
BASOPHILS # BLD AUTO: 0.04 THOUSANDS/ΜL (ref 0–0.1)
BASOPHILS NFR BLD AUTO: 1 % (ref 0–1)
BILIRUB UR QL STRIP: NEGATIVE
BLD GP AB SCN SERPL QL: NEGATIVE
CLARITY UR: ABNORMAL
COLOR UR: YELLOW
EOSINOPHIL # BLD AUTO: 0.1 THOUSAND/ΜL (ref 0–0.61)
EOSINOPHIL NFR BLD AUTO: 1 % (ref 0–6)
ERYTHROCYTE [DISTWIDTH] IN BLOOD BY AUTOMATED COUNT: 17.5 % (ref 11.6–15.1)
GLUCOSE UR STRIP-MCNC: NEGATIVE MG/DL
HCT VFR BLD AUTO: 34.2 % (ref 34.8–46.1)
HGB BLD-MCNC: 10.3 G/DL (ref 11.5–15.4)
HGB UR QL STRIP.AUTO: NEGATIVE
IMM GRANULOCYTES # BLD AUTO: 0.02 THOUSAND/UL (ref 0–0.2)
IMM GRANULOCYTES NFR BLD AUTO: 0 % (ref 0–2)
KETONES UR STRIP-MCNC: NEGATIVE MG/DL
LEUKOCYTE ESTERASE UR QL STRIP: ABNORMAL
LYMPHOCYTES # BLD AUTO: 1.57 THOUSANDS/ΜL (ref 0.6–4.47)
LYMPHOCYTES NFR BLD AUTO: 20 % (ref 14–44)
MCH RBC QN AUTO: 25.4 PG (ref 26.8–34.3)
MCHC RBC AUTO-ENTMCNC: 30.1 G/DL (ref 31.4–37.4)
MCV RBC AUTO: 84 FL (ref 82–98)
MONOCYTES # BLD AUTO: 0.42 THOUSAND/ΜL (ref 0.17–1.22)
MONOCYTES NFR BLD AUTO: 5 % (ref 4–12)
NEUTROPHILS # BLD AUTO: 5.77 THOUSANDS/ΜL (ref 1.85–7.62)
NEUTS SEG NFR BLD AUTO: 73 % (ref 43–75)
NITRITE UR QL STRIP: NEGATIVE
NON-SQ EPI CELLS URNS QL MICRO: ABNORMAL /HPF
NRBC BLD AUTO-RTO: 0 /100 WBCS
PH UR STRIP.AUTO: 6 [PH] (ref 4.5–8)
PLATELET # BLD AUTO: 302 THOUSANDS/UL (ref 149–390)
PMV BLD AUTO: 10.3 FL (ref 8.9–12.7)
PROT UR STRIP-MCNC: NEGATIVE MG/DL
RBC # BLD AUTO: 4.05 MILLION/UL (ref 3.81–5.12)
RBC #/AREA URNS AUTO: ABNORMAL /HPF
RH BLD: POSITIVE
RUBV IGG SERPL IA-ACNC: 27 IU/ML
SP GR UR STRIP.AUTO: >=1.03 (ref 1–1.03)
SPECIMEN EXPIRATION DATE: NORMAL
UROBILINOGEN UR QL STRIP.AUTO: 0.2 E.U./DL
WBC # BLD AUTO: 7.92 THOUSAND/UL (ref 4.31–10.16)
WBC #/AREA URNS AUTO: ABNORMAL /HPF

## 2019-02-04 PROCEDURE — 36415 COLL VENOUS BLD VENIPUNCTURE: CPT

## 2019-02-04 PROCEDURE — 84702 CHORIONIC GONADOTROPIN TEST: CPT

## 2019-02-04 PROCEDURE — 80081 OBSTETRIC PANEL INC HIV TSTG: CPT

## 2019-02-04 PROCEDURE — 86336 INHIBIN A: CPT

## 2019-02-04 PROCEDURE — 82105 ALPHA-FETOPROTEIN SERUM: CPT

## 2019-02-04 PROCEDURE — 82677 ASSAY OF ESTRIOL: CPT

## 2019-02-04 PROCEDURE — 87086 URINE CULTURE/COLONY COUNT: CPT

## 2019-02-04 PROCEDURE — 81001 URINALYSIS AUTO W/SCOPE: CPT

## 2019-02-05 ENCOUNTER — TELEPHONE (OUTPATIENT)
Dept: OBGYN CLINIC | Facility: CLINIC | Age: 24
End: 2019-02-05

## 2019-02-05 ENCOUNTER — ROUTINE PRENATAL (OUTPATIENT)
Dept: OBGYN CLINIC | Facility: CLINIC | Age: 24
End: 2019-02-05

## 2019-02-05 VITALS
BODY MASS INDEX: 24.2 KG/M2 | WEIGHT: 169 LBS | DIASTOLIC BLOOD PRESSURE: 71 MMHG | SYSTOLIC BLOOD PRESSURE: 114 MMHG | HEART RATE: 83 BPM | HEIGHT: 70 IN

## 2019-02-05 DIAGNOSIS — Z3A.14 14 WEEKS GESTATION OF PREGNANCY: ICD-10-CM

## 2019-02-05 DIAGNOSIS — Z3A.18 18 WEEKS GESTATION OF PREGNANCY: Primary | Chronic | ICD-10-CM

## 2019-02-05 DIAGNOSIS — N93.9 VAGINAL BLEEDING: ICD-10-CM

## 2019-02-05 DIAGNOSIS — O99.019 ANTEPARTUM ANEMIA: Primary | ICD-10-CM

## 2019-02-05 DIAGNOSIS — O20.0 THREATENED ABORTION: ICD-10-CM

## 2019-02-05 LAB
BACTERIA UR CULT: NORMAL
HBV SURFACE AG SER QL: NORMAL
HIV 1+2 AB+HIV1 P24 AG SERPL QL IA: NORMAL
RPR SER QL: NORMAL

## 2019-02-05 PROCEDURE — 99214 OFFICE O/P EST MOD 30 MIN: CPT | Performed by: OBSTETRICS & GYNECOLOGY

## 2019-02-05 RX ORDER — PNV NO.95/FERROUS FUM/FOLIC AC 28MG-0.8MG
1 TABLET ORAL DAILY
Qty: 30 TABLET | Refills: 0 | Status: SHIPPED | OUTPATIENT
Start: 2019-02-05 | End: 2020-05-19

## 2019-02-05 RX ORDER — DOCUSATE SODIUM 100 MG/1
100 CAPSULE, LIQUID FILLED ORAL 2 TIMES DAILY
Qty: 30 CAPSULE | Refills: 3 | Status: SHIPPED | OUTPATIENT
Start: 2019-02-05 | End: 2020-05-19

## 2019-02-05 RX ORDER — FERROUS SULFATE TAB EC 324 MG (65 MG FE EQUIVALENT) 324 (65 FE) MG
324 TABLET DELAYED RESPONSE ORAL
Qty: 60 TABLET | Refills: 5 | Status: SHIPPED | OUTPATIENT
Start: 2019-02-05 | End: 2020-05-21

## 2019-02-05 RX ORDER — FERROUS SULFATE TAB EC 324 MG (65 MG FE EQUIVALENT) 324 (65 FE) MG
324 TABLET DELAYED RESPONSE ORAL
Qty: 60 TABLET | Refills: 5 | Status: SHIPPED | OUTPATIENT
Start: 2019-02-05 | End: 2020-05-19

## 2019-02-05 RX ORDER — CALCIUM CARBONATE 500(1250)
1 TABLET ORAL DAILY
Qty: 90 TABLET | Refills: 3 | Status: SHIPPED | OUTPATIENT
Start: 2019-02-05 | End: 2020-05-21 | Stop reason: SDUPTHER

## 2019-02-05 NOTE — PROGRESS NOTES
OB/GYN  PRENATAL H&P VISIT  Lucas Granda  2/5/2019  11:13 AM  Dr Tfif Romero MD      SUBJECTIVE  Patient is here for initial prenatal H&P  This is an intended pregnancy  Patient is currently doing well  She is here alone  Her previous pregnancies have been 1 term baby with vaginal delivery and 1 miscarriage at 5 weeks  She currently works at unemployed  She has   a support system at home  She does not have hx of STD/STI, denies a hx of TB or close contacts with persons with TB  She  a family history of inheritable conditions such as physical or intellectual disabilities, birth defects, blood disorders, heart or neural tube defects  She never had MRSA  She has not been traveled recently or  plan travel in the near future  She never use of nicotine or recreational drug use  She denies vaginal bleeding, cramping, leakage, abnormal discharge  Review of Systems   HENT: Negative  Respiratory: Negative  Cardiovascular: Negative  Gastrointestinal: Negative  Genitourinary: Negative  Neurological: Negative  Psychiatric/Behavioral: Negative  Past Medical History:   Diagnosis Date    Miscarriage        No past surgical history on file  Social History     Social History    Marital status: Single     Spouse name: N/A    Number of children: N/A    Years of education: N/A     Occupational History    Not on file  Social History Main Topics    Smoking status: Never Smoker    Smokeless tobacco: Never Used    Alcohol use No    Drug use: No    Sexual activity: Yes     Partners: Male     Birth control/ protection: None     Other Topics Concern    Not on file     Social History Narrative    No narrative on file       OBJECTIVE  Vitals:    02/05/19 1101   BP: 114/71   Pulse: 83     Physical Exam   Constitutional: She appears well-developed and well-nourished  No distress  Cardiovascular: Normal rate  Pulmonary/Chest: Effort normal    Abdominal: Soft     Musculoskeletal: Normal range of motion  Neurological: She is alert  Skin: Skin is warm  She is not diaphoretic  ASSESSMENT AND PLAN    26 y o , , with /71   Pulse 83   Ht 5' 10" (1 778 m)   Wt 76 7 kg (169 lb)   LMP 2018 , at Gestational Age: <None> here for her prenatal H&P   by doppler US    1  Pregnancy: H&P completed today  PN Labs reviewed today  Final dating via LMP  TVUS showed 17w 6d on 19  Labor expectations discussed with patient, including appointment schedule, nutrition, weight gain, exercise, medications, sexual intercourse, and nausea/vomiting  2  Screening: Pap smear Negative for intraepithelial neoplasia on 19  GC/CT collected and negative on 19  Sequential screening reviewed with patient - will proceed with quad screen ordered  Reviewed carrier screening for cystic fibrosis, hemoglobinapathies, Fragile X, and somatic muscular atrophy  3  Consents: Delivery process including potential OVD and  reviewed  Consents signed today  4  Labor: For analgesia, patient plans on epidural maybe she did not have previously any pain management during labor  5  Postpartum: Patient plans on  breastfeeding  Different methods of contraception were discussed with patient, including progesterone only oral pills, depo provera, nexplanon, mirena, and paragard  Patient would like to use one of the LARC during postpartum phase  6  Follow up: RTC in 4 weeks  Precautions regarding labor, leakage, bleeding, and fetal movement reviewed        D/w Dr Chevy Barton MD  3313  24:81 AM

## 2019-02-07 LAB
2ND TRIMESTER 4 SCREEN SERPL-IMP: NORMAL
2ND TRIMESTER 4 SCREEN SERPL-IMP: NORMAL
AFP ADJ MOM SERPL: 0.68
AFP SERPL-MCNC: 29.9 NG/ML
AGE AT DELIVERY: 23.3 YR
FET TS 18 RISK FROM MAT AGE: NORMAL
FET TS 21 RISK FROM MAT AGE: 1094
GA METHOD: NORMAL
GA: 18.7 WEEKS
HCG ADJ MOM SERPL: 1.28
HCG SERPL-ACNC: NORMAL MIU/ML
IDDM PATIENT QL: NO
INHIBIN A ADJ MOM SERPL: 0.9
INHIBIN A SERPL-MCNC: 149.04 PG/ML
KARYOTYP BLD/T: NORMAL
MULTIPLE PREGNANCY: NO
NEURAL TUBE DEFECT RISK FETUS: NORMAL %
SERVICE CMNT-IMP: NORMAL
TS 18 RISK FETUS: NORMAL
TS 21 RISK FETUS: 3799
U ESTRIOL ADJ MOM SERPL: 1.04
U ESTRIOL SERPL-MCNC: 1.54 NG/ML

## 2019-03-05 ENCOUNTER — ROUTINE PRENATAL (OUTPATIENT)
Dept: PERINATAL CARE | Facility: CLINIC | Age: 24
End: 2019-03-05
Payer: COMMERCIAL

## 2019-03-05 ENCOUNTER — ROUTINE PRENATAL (OUTPATIENT)
Dept: OBGYN CLINIC | Facility: CLINIC | Age: 24
End: 2019-03-05

## 2019-03-05 VITALS
DIASTOLIC BLOOD PRESSURE: 72 MMHG | BODY MASS INDEX: 24.94 KG/M2 | HEART RATE: 80 BPM | SYSTOLIC BLOOD PRESSURE: 120 MMHG | WEIGHT: 174.2 LBS | HEIGHT: 70 IN

## 2019-03-05 VITALS — SYSTOLIC BLOOD PRESSURE: 101 MMHG | DIASTOLIC BLOOD PRESSURE: 59 MMHG | BODY MASS INDEX: 24.85 KG/M2 | WEIGHT: 173.2 LBS

## 2019-03-05 DIAGNOSIS — Z3A.22 22 WEEKS GESTATION OF PREGNANCY: ICD-10-CM

## 2019-03-05 DIAGNOSIS — Z34.92 PRENATAL CARE, SECOND TRIMESTER: Primary | ICD-10-CM

## 2019-03-05 DIAGNOSIS — Z36.3 ENCOUNTER FOR ANTENATAL SCREENING FOR MALFORMATION USING ULTRASOUND: Primary | ICD-10-CM

## 2019-03-05 DIAGNOSIS — Z36.86 ENCOUNTER FOR ANTENATAL SCREENING FOR CERVICAL LENGTH: ICD-10-CM

## 2019-03-05 PROCEDURE — 99213 OFFICE O/P EST LOW 20 MIN: CPT | Performed by: NURSE PRACTITIONER

## 2019-03-05 PROCEDURE — 76805 OB US >/= 14 WKS SNGL FETUS: CPT | Performed by: OBSTETRICS & GYNECOLOGY

## 2019-03-05 PROCEDURE — 76817 TRANSVAGINAL US OBSTETRIC: CPT | Performed by: OBSTETRICS & GYNECOLOGY

## 2019-03-05 NOTE — PROGRESS NOTES
A transvaginal ultrasound was performed   Sonographer note on use of High Level Disinfection Process (Trophon) for transvaginal probe# 1 used, serial #[id number]  Dylan Joseph RDMS

## 2019-03-05 NOTE — PATIENT INSTRUCTIONS
Keep  center follow up  Call with vaginal bleeding, loss of fluid, regular contractions,  other needs or concerns    Return in 4 weeks

## 2019-03-05 NOTE — PROGRESS NOTES
The patient was seen today for an ultrasound  Please see ultrasound report (located under Ob Procedures) for additional details  Thank you very much for allowing us to participate in the care of this very nice patient  Should you have any questions, please do not hesitate to contact me  Shubham Tovar MD [address]  Attending Physician, Emily

## 2019-03-05 NOTE — PROGRESS NOTES
Assessment & Plan  26 y o   at 22w6d presenting for routine prenatal visit  Pt had  U/S size =dates  WNL anatomy seen, has F/U missed anatomy  Has not started iron states she is waiting for insurance  Explained it can be purchased OTC  Offered assistance with insurance  Problem List Items Addressed This Visit        Other    22 weeks gestation of pregnancy    Prenatal care, second trimester - Primary        ____________________________________________________________  Subjective  She is without complaint  She denies contractions, loss of fluid, or vaginal bleeding  She feels regular fetal movements  Objective  /59   Wt 78 6 kg (173 lb 3 2 oz)   LMP 2018   BMI 24 85 kg/m²   FHR: 155     Patient's Active Problem List  Patient Active Problem List   Diagnosis    22 weeks gestation of pregnancy    Prenatal care, second trimester    Threatened  in second trimester   Plan  Keep  center follow up  Call with vaginal bleeding, loss of fluid, regular contractions,  other needs or concerns  Return in 4 weeks  Pt verbalized understanding of all discussed

## 2019-04-02 ENCOUNTER — ROUTINE PRENATAL (OUTPATIENT)
Dept: OBGYN CLINIC | Facility: CLINIC | Age: 24
End: 2019-04-02

## 2019-04-02 VITALS
DIASTOLIC BLOOD PRESSURE: 67 MMHG | BODY MASS INDEX: 25.77 KG/M2 | SYSTOLIC BLOOD PRESSURE: 106 MMHG | WEIGHT: 179.6 LBS | HEART RATE: 87 BPM

## 2019-04-02 DIAGNOSIS — Z3A.26 26 WEEKS GESTATION OF PREGNANCY: Primary | ICD-10-CM

## 2019-04-02 DIAGNOSIS — Z34.92 PRENATAL CARE, SECOND TRIMESTER: ICD-10-CM

## 2019-04-02 PROCEDURE — 99213 OFFICE O/P EST LOW 20 MIN: CPT | Performed by: NURSE PRACTITIONER

## 2019-04-23 ENCOUNTER — ROUTINE PRENATAL (OUTPATIENT)
Dept: OBGYN CLINIC | Facility: CLINIC | Age: 24
End: 2019-04-23

## 2019-04-23 VITALS — DIASTOLIC BLOOD PRESSURE: 68 MMHG | BODY MASS INDEX: 26.29 KG/M2 | WEIGHT: 183.2 LBS | SYSTOLIC BLOOD PRESSURE: 112 MMHG

## 2019-04-23 DIAGNOSIS — Z3A.29 29 WEEKS GESTATION OF PREGNANCY: Primary | ICD-10-CM

## 2019-04-23 PROCEDURE — 90715 TDAP VACCINE 7 YRS/> IM: CPT

## 2019-04-23 PROCEDURE — 99213 OFFICE O/P EST LOW 20 MIN: CPT | Performed by: OBSTETRICS & GYNECOLOGY

## 2019-04-23 PROCEDURE — 90471 IMMUNIZATION ADMIN: CPT

## 2019-04-24 LAB
BASOPHILS # BLD AUTO: 31 CELLS/UL (ref 0–200)
BASOPHILS NFR BLD AUTO: 0.3 %
EOSINOPHIL # BLD AUTO: 51 CELLS/UL (ref 15–500)
EOSINOPHIL NFR BLD AUTO: 0.5 %
ERYTHROCYTE [DISTWIDTH] IN BLOOD BY AUTOMATED COUNT: 16.1 % (ref 11–15)
GLUCOSE 1H P 50 G GLC PO SERPL-MCNC: 120 MG/DL
HCT VFR BLD AUTO: 33.7 % (ref 35–45)
HGB BLD-MCNC: 10.9 G/DL (ref 11.7–15.5)
LYMPHOCYTES # BLD AUTO: 1948 CELLS/UL (ref 850–3900)
LYMPHOCYTES NFR BLD AUTO: 19.1 %
MCH RBC QN AUTO: 28 PG (ref 27–33)
MCHC RBC AUTO-ENTMCNC: 32.3 G/DL (ref 32–36)
MCV RBC AUTO: 86.6 FL (ref 80–100)
MONOCYTES # BLD AUTO: 459 CELLS/UL (ref 200–950)
MONOCYTES NFR BLD AUTO: 4.5 %
NEUTROPHILS # BLD AUTO: 7711 CELLS/UL (ref 1500–7800)
NEUTROPHILS NFR BLD AUTO: 75.6 %
PLATELET # BLD AUTO: 241 THOUSAND/UL (ref 140–400)
PMV BLD REES-ECKER: 10.7 FL (ref 7.5–12.5)
RBC # BLD AUTO: 3.89 MILLION/UL (ref 3.8–5.1)
RPR SER QL: NORMAL
WBC # BLD AUTO: 10.2 THOUSAND/UL (ref 3.8–10.8)

## 2019-05-14 ENCOUNTER — ROUTINE PRENATAL (OUTPATIENT)
Dept: OBGYN CLINIC | Facility: CLINIC | Age: 24
End: 2019-05-14

## 2019-05-14 VITALS — BODY MASS INDEX: 26.6 KG/M2 | SYSTOLIC BLOOD PRESSURE: 119 MMHG | WEIGHT: 185.4 LBS | DIASTOLIC BLOOD PRESSURE: 72 MMHG

## 2019-05-14 DIAGNOSIS — Z3A.32 32 WEEKS GESTATION OF PREGNANCY: ICD-10-CM

## 2019-05-14 PROBLEM — Z34.93 PRENATAL CARE IN THIRD TRIMESTER: Status: ACTIVE | Noted: 2019-01-24

## 2019-05-14 PROCEDURE — 99213 OFFICE O/P EST LOW 20 MIN: CPT | Performed by: NURSE PRACTITIONER

## 2019-05-30 ENCOUNTER — ROUTINE PRENATAL (OUTPATIENT)
Dept: OBGYN CLINIC | Facility: CLINIC | Age: 24
End: 2019-05-30

## 2019-05-30 VITALS
SYSTOLIC BLOOD PRESSURE: 118 MMHG | HEART RATE: 79 BPM | HEIGHT: 70 IN | BODY MASS INDEX: 27.49 KG/M2 | DIASTOLIC BLOOD PRESSURE: 71 MMHG | WEIGHT: 192 LBS

## 2019-05-30 DIAGNOSIS — Z3A.35 35 WEEKS GESTATION OF PREGNANCY: ICD-10-CM

## 2019-05-30 DIAGNOSIS — Z34.93 PRENATAL CARE IN THIRD TRIMESTER: Primary | ICD-10-CM

## 2019-05-30 PROBLEM — O20.0 THREATENED ABORTION IN SECOND TRIMESTER: Chronic | Status: RESOLVED | Noted: 2019-01-24 | Resolved: 2019-05-30

## 2019-05-30 PROCEDURE — 99213 OFFICE O/P EST LOW 20 MIN: CPT | Performed by: NURSE PRACTITIONER

## 2019-06-06 ENCOUNTER — ROUTINE PRENATAL (OUTPATIENT)
Dept: OBGYN CLINIC | Facility: CLINIC | Age: 24
End: 2019-06-06

## 2019-06-06 VITALS
HEART RATE: 72 BPM | DIASTOLIC BLOOD PRESSURE: 78 MMHG | SYSTOLIC BLOOD PRESSURE: 127 MMHG | BODY MASS INDEX: 27.75 KG/M2 | WEIGHT: 193.4 LBS

## 2019-06-06 DIAGNOSIS — A64 STD (SEXUALLY TRANSMITTED DISEASE): ICD-10-CM

## 2019-06-06 DIAGNOSIS — Z3A.36 36 WEEKS GESTATION OF PREGNANCY: ICD-10-CM

## 2019-06-06 DIAGNOSIS — Z72.51 HIGH RISK HETEROSEXUAL BEHAVIOR: ICD-10-CM

## 2019-06-06 DIAGNOSIS — Z34.93 PRENATAL CARE IN THIRD TRIMESTER: Primary | ICD-10-CM

## 2019-06-06 PROCEDURE — 87653 STREP B DNA AMP PROBE: CPT | Performed by: NURSE PRACTITIONER

## 2019-06-06 PROCEDURE — 87491 CHLMYD TRACH DNA AMP PROBE: CPT | Performed by: NURSE PRACTITIONER

## 2019-06-06 PROCEDURE — 87591 N.GONORRHOEAE DNA AMP PROB: CPT | Performed by: NURSE PRACTITIONER

## 2019-06-06 PROCEDURE — 99215 OFFICE O/P EST HI 40 MIN: CPT | Performed by: NURSE PRACTITIONER

## 2019-06-07 LAB
C TRACH DNA SPEC QL NAA+PROBE: NEGATIVE
N GONORRHOEA DNA SPEC QL NAA+PROBE: NEGATIVE

## 2019-06-08 LAB — GP B STREP DNA SPEC QL NAA+PROBE: NORMAL

## 2019-06-13 ENCOUNTER — ROUTINE PRENATAL (OUTPATIENT)
Dept: OBGYN CLINIC | Facility: CLINIC | Age: 24
End: 2019-06-13

## 2019-06-13 VITALS
HEART RATE: 67 BPM | WEIGHT: 195 LBS | DIASTOLIC BLOOD PRESSURE: 68 MMHG | SYSTOLIC BLOOD PRESSURE: 114 MMHG | BODY MASS INDEX: 27.98 KG/M2

## 2019-06-13 DIAGNOSIS — Z34.93 PRENATAL CARE IN THIRD TRIMESTER: Primary | ICD-10-CM

## 2019-06-13 PROBLEM — Z3A.37 37 WEEKS GESTATION OF PREGNANCY: Status: ACTIVE | Noted: 2019-01-24

## 2019-06-13 PROCEDURE — 99215 OFFICE O/P EST HI 40 MIN: CPT | Performed by: NURSE PRACTITIONER

## 2019-06-20 ENCOUNTER — ROUTINE PRENATAL (OUTPATIENT)
Dept: OBGYN CLINIC | Facility: CLINIC | Age: 24
End: 2019-06-20

## 2019-06-20 VITALS — SYSTOLIC BLOOD PRESSURE: 154 MMHG | DIASTOLIC BLOOD PRESSURE: 77 MMHG | BODY MASS INDEX: 28.35 KG/M2 | WEIGHT: 197.6 LBS

## 2019-06-20 DIAGNOSIS — Z34.93 PRENATAL CARE IN THIRD TRIMESTER: Primary | ICD-10-CM

## 2019-06-20 DIAGNOSIS — Z3A.38 38 WEEKS GESTATION OF PREGNANCY: ICD-10-CM

## 2019-06-20 PROBLEM — A64 STD (SEXUALLY TRANSMITTED DISEASE): Status: RESOLVED | Noted: 2019-06-06 | Resolved: 2019-06-20

## 2019-06-20 PROBLEM — Z72.51 HIGH RISK HETEROSEXUAL BEHAVIOR: Status: RESOLVED | Noted: 2019-06-06 | Resolved: 2019-06-20

## 2019-06-20 PROCEDURE — 99215 OFFICE O/P EST HI 40 MIN: CPT | Performed by: NURSE PRACTITIONER

## 2019-06-21 ENCOUNTER — ANESTHESIA (INPATIENT)
Dept: ANESTHESIOLOGY | Facility: HOSPITAL | Age: 24
DRG: 560 | End: 2019-06-21
Payer: COMMERCIAL

## 2019-06-21 ENCOUNTER — ANESTHESIA EVENT (INPATIENT)
Dept: ANESTHESIOLOGY | Facility: HOSPITAL | Age: 24
DRG: 560 | End: 2019-06-21
Payer: COMMERCIAL

## 2019-06-21 ENCOUNTER — HOSPITAL ENCOUNTER (INPATIENT)
Facility: HOSPITAL | Age: 24
LOS: 2 days | Discharge: HOME | DRG: 560 | End: 2019-06-23
Attending: OBSTETRICS & GYNECOLOGY | Admitting: OBSTETRICS & GYNECOLOGY
Payer: COMMERCIAL

## 2019-06-21 DIAGNOSIS — Z3A.38 38 WEEKS GESTATION OF PREGNANCY: Primary | ICD-10-CM

## 2019-06-21 LAB
ABO GROUP BLD: NORMAL
BASE EXCESS BLDCOA CALC-SCNC: -4.1 MMOL/L (ref 3–11)
BASE EXCESS BLDCOV CALC-SCNC: -3.8 MMOL/L (ref 1–9)
BASOPHILS # BLD AUTO: 0.04 THOUSANDS/ΜL (ref 0–0.1)
BASOPHILS NFR BLD AUTO: 0 % (ref 0–1)
BLD GP AB SCN SERPL QL: NEGATIVE
EOSINOPHIL # BLD AUTO: 0.03 THOUSAND/ΜL (ref 0–0.61)
EOSINOPHIL NFR BLD AUTO: 0 % (ref 0–6)
ERYTHROCYTE [DISTWIDTH] IN BLOOD BY AUTOMATED COUNT: 15.4 % (ref 11.6–15.1)
HCO3 BLDCOA-SCNC: 24.9 MMOL/L (ref 17.3–27.3)
HCO3 BLDCOV-SCNC: 22.2 MMOL/L (ref 12.2–28.6)
HCT VFR BLD AUTO: 40.4 % (ref 34.8–46.1)
HGB BLD-MCNC: 12.6 G/DL (ref 11.5–15.4)
IMM GRANULOCYTES # BLD AUTO: 0.06 THOUSAND/UL (ref 0–0.2)
IMM GRANULOCYTES NFR BLD AUTO: 1 % (ref 0–2)
LYMPHOCYTES # BLD AUTO: 2.17 THOUSANDS/ΜL (ref 0.6–4.47)
LYMPHOCYTES NFR BLD AUTO: 18 % (ref 14–44)
MCH RBC QN AUTO: 28.7 PG (ref 26.8–34.3)
MCHC RBC AUTO-ENTMCNC: 31.2 G/DL (ref 31.4–37.4)
MCV RBC AUTO: 92 FL (ref 82–98)
MONOCYTES # BLD AUTO: 0.64 THOUSAND/ΜL (ref 0.17–1.22)
MONOCYTES NFR BLD AUTO: 5 % (ref 4–12)
NEUTROPHILS # BLD AUTO: 9.39 THOUSANDS/ΜL (ref 1.85–7.62)
NEUTS SEG NFR BLD AUTO: 76 % (ref 43–75)
NRBC BLD AUTO-RTO: 0 /100 WBCS
O2 CT VFR BLDCOA CALC: 10.3 ML/DL
OXYHGB MFR BLDCOA: 44.6 %
OXYHGB MFR BLDCOV: 64.8 %
PCO2 BLDCOA: 61.5 MM[HG] (ref 30–60)
PCO2 BLDCOV: 43.2 MM HG (ref 27–43)
PH BLDCOA: 7.23 [PH] (ref 7.23–7.43)
PH BLDCOV: 7.33 [PH] (ref 7.19–7.49)
PLATELET # BLD AUTO: 268 THOUSANDS/UL (ref 149–390)
PMV BLD AUTO: 10.6 FL (ref 8.9–12.7)
PO2 BLDCOA: 23.3 MM HG (ref 5–25)
PO2 BLDCOV: 29.3 MM HG (ref 15–45)
RBC # BLD AUTO: 4.39 MILLION/UL (ref 3.81–5.12)
RH BLD: POSITIVE
SAO2 % BLDCOV: 14.9 ML/DL
SPECIMEN EXPIRATION DATE: NORMAL
WBC # BLD AUTO: 12.33 THOUSAND/UL (ref 4.31–10.16)

## 2019-06-21 PROCEDURE — 85025 COMPLETE CBC W/AUTO DIFF WBC: CPT | Performed by: OBSTETRICS & GYNECOLOGY

## 2019-06-21 PROCEDURE — 86901 BLOOD TYPING SEROLOGIC RH(D): CPT | Performed by: OBSTETRICS & GYNECOLOGY

## 2019-06-21 PROCEDURE — 99203 OFFICE O/P NEW LOW 30 MIN: CPT

## 2019-06-21 PROCEDURE — 86592 SYPHILIS TEST NON-TREP QUAL: CPT | Performed by: OBSTETRICS & GYNECOLOGY

## 2019-06-21 PROCEDURE — 4A1HXCZ MONITORING OF PRODUCTS OF CONCEPTION, CARDIAC RATE, EXTERNAL APPROACH: ICD-10-PCS | Performed by: OBSTETRICS & GYNECOLOGY

## 2019-06-21 PROCEDURE — NC001 PR NO CHARGE: Performed by: OBSTETRICS & GYNECOLOGY

## 2019-06-21 PROCEDURE — 86850 RBC ANTIBODY SCREEN: CPT | Performed by: OBSTETRICS & GYNECOLOGY

## 2019-06-21 PROCEDURE — 86900 BLOOD TYPING SEROLOGIC ABO: CPT | Performed by: OBSTETRICS & GYNECOLOGY

## 2019-06-21 PROCEDURE — 82805 BLOOD GASES W/O2 SATURATION: CPT | Performed by: OBSTETRICS & GYNECOLOGY

## 2019-06-21 PROCEDURE — 59409 OBSTETRICAL CARE: CPT | Performed by: OBSTETRICS & GYNECOLOGY

## 2019-06-21 RX ORDER — OXYTOCIN/RINGER'S LACTATE 30/500 ML
250 PLASTIC BAG, INJECTION (ML) INTRAVENOUS
Status: DISCONTINUED | OUTPATIENT
Start: 2019-06-21 | End: 2019-06-23 | Stop reason: HOSPADM

## 2019-06-21 RX ORDER — LIDOCAINE HYDROCHLORIDE AND EPINEPHRINE 15; 5 MG/ML; UG/ML
INJECTION, SOLUTION EPIDURAL AS NEEDED
Status: DISCONTINUED | OUTPATIENT
Start: 2019-06-21 | End: 2019-06-21 | Stop reason: SURG

## 2019-06-21 RX ORDER — DIAPER,BRIEF,INFANT-TODD,DISP
1 EACH MISCELLANEOUS AS NEEDED
Status: DISCONTINUED | OUTPATIENT
Start: 2019-06-21 | End: 2019-06-23 | Stop reason: HOSPADM

## 2019-06-21 RX ORDER — CALCIUM CARBONATE 200(500)MG
1000 TABLET,CHEWABLE ORAL DAILY PRN
Status: DISCONTINUED | OUTPATIENT
Start: 2019-06-21 | End: 2019-06-23 | Stop reason: HOSPADM

## 2019-06-21 RX ORDER — ACETAMINOPHEN 325 MG/1
650 TABLET ORAL EVERY 4 HOURS PRN
Status: DISCONTINUED | OUTPATIENT
Start: 2019-06-21 | End: 2019-06-23 | Stop reason: HOSPADM

## 2019-06-21 RX ORDER — DOCUSATE SODIUM 100 MG/1
100 CAPSULE, LIQUID FILLED ORAL 2 TIMES DAILY
Status: DISCONTINUED | OUTPATIENT
Start: 2019-06-21 | End: 2019-06-23 | Stop reason: HOSPADM

## 2019-06-21 RX ORDER — SODIUM CHLORIDE, SODIUM LACTATE, POTASSIUM CHLORIDE, CALCIUM CHLORIDE 600; 310; 30; 20 MG/100ML; MG/100ML; MG/100ML; MG/100ML
125 INJECTION, SOLUTION INTRAVENOUS CONTINUOUS
Status: DISCONTINUED | OUTPATIENT
Start: 2019-06-21 | End: 2019-06-23 | Stop reason: HOSPADM

## 2019-06-21 RX ORDER — METHYLERGONOVINE MALEATE 0.2 MG/ML
INJECTION INTRAVENOUS
Status: COMPLETED
Start: 2019-06-21 | End: 2019-06-21

## 2019-06-21 RX ORDER — OXYTOCIN/RINGER'S LACTATE 30/500 ML
62.5 PLASTIC BAG, INJECTION (ML) INTRAVENOUS CONTINUOUS
Status: ACTIVE | OUTPATIENT
Start: 2019-06-21 | End: 2019-06-22

## 2019-06-21 RX ORDER — ROPIVACAINE HYDROCHLORIDE 2 MG/ML
INJECTION, SOLUTION EPIDURAL; INFILTRATION; PERINEURAL CONTINUOUS PRN
Status: DISCONTINUED | OUTPATIENT
Start: 2019-06-21 | End: 2019-06-21 | Stop reason: SURG

## 2019-06-21 RX ORDER — ROPIVACAINE HYDROCHLORIDE 2 MG/ML
INJECTION, SOLUTION EPIDURAL; INFILTRATION; PERINEURAL AS NEEDED
Status: DISCONTINUED | OUTPATIENT
Start: 2019-06-21 | End: 2019-06-21 | Stop reason: SURG

## 2019-06-21 RX ORDER — ONDANSETRON 2 MG/ML
4 INJECTION INTRAMUSCULAR; INTRAVENOUS EVERY 6 HOURS PRN
Status: DISCONTINUED | OUTPATIENT
Start: 2019-06-21 | End: 2019-06-23 | Stop reason: HOSPADM

## 2019-06-21 RX ORDER — FERROUS SULFATE 325(65) MG
325 TABLET ORAL
Status: DISCONTINUED | OUTPATIENT
Start: 2019-06-22 | End: 2019-06-23 | Stop reason: HOSPADM

## 2019-06-21 RX ORDER — METHYLERGONOVINE MALEATE 0.2 MG/ML
0.2 INJECTION INTRAVENOUS ONCE
Status: COMPLETED | OUTPATIENT
Start: 2019-06-21 | End: 2019-06-21

## 2019-06-21 RX ORDER — IBUPROFEN 600 MG/1
600 TABLET ORAL EVERY 6 HOURS PRN
Status: DISCONTINUED | OUTPATIENT
Start: 2019-06-21 | End: 2019-06-23 | Stop reason: HOSPADM

## 2019-06-21 RX ORDER — OXYTOCIN/RINGER'S LACTATE 30/500 ML
PLASTIC BAG, INJECTION (ML) INTRAVENOUS
Status: COMPLETED
Start: 2019-06-21 | End: 2019-06-21

## 2019-06-21 RX ADMIN — Medication 250 MILLI-UNITS/MIN: at 20:50

## 2019-06-21 RX ADMIN — ROPIVACAINE HYDROCHLORIDE 10 ML/HR: 2 INJECTION, SOLUTION EPIDURAL; INFILTRATION at 18:37

## 2019-06-21 RX ADMIN — ROPIVACAINE HYDROCHLORIDE: 2 INJECTION, SOLUTION EPIDURAL; INFILTRATION at 18:35

## 2019-06-21 RX ADMIN — ROPIVACAINE HYDROCHLORIDE 5 ML: 2 INJECTION, SOLUTION EPIDURAL; INFILTRATION at 18:31

## 2019-06-21 RX ADMIN — METHYLERGONOVINE MALEATE 0.2 MG: 0.2 INJECTION, SOLUTION INTRAMUSCULAR; INTRAVENOUS at 21:10

## 2019-06-21 RX ADMIN — LIDOCAINE HYDROCHLORIDE AND EPINEPHRINE 3 ML: 15; 5 INJECTION, SOLUTION EPIDURAL at 18:28

## 2019-06-21 RX ADMIN — WITCH HAZEL 1 PAD: 500 SOLUTION RECTAL; TOPICAL at 22:36

## 2019-06-21 RX ADMIN — ONDANSETRON 4 MG: 2 INJECTION INTRAMUSCULAR; INTRAVENOUS at 17:13

## 2019-06-21 RX ADMIN — METHYLERGONOVINE MALEATE 0.2 MG: 0.2 INJECTION INTRAVENOUS at 21:10

## 2019-06-21 RX ADMIN — BENZOCAINE AND LEVOMENTHOL: 200; 5 SPRAY TOPICAL at 22:36

## 2019-06-21 RX ADMIN — ROPIVACAINE HYDROCHLORIDE 5 ML: 2 INJECTION, SOLUTION EPIDURAL; INFILTRATION at 18:35

## 2019-06-21 RX ADMIN — Medication 62.5 MILLI-UNITS/MIN: at 21:33

## 2019-06-21 RX ADMIN — SODIUM CHLORIDE, SODIUM LACTATE, POTASSIUM CHLORIDE, AND CALCIUM CHLORIDE 999 ML/HR: .6; .31; .03; .02 INJECTION, SOLUTION INTRAVENOUS at 17:20

## 2019-06-21 RX ADMIN — SODIUM CHLORIDE, SODIUM LACTATE, POTASSIUM CHLORIDE, AND CALCIUM CHLORIDE 999 ML/HR: .6; .31; .03; .02 INJECTION, SOLUTION INTRAVENOUS at 18:30

## 2019-06-22 PROCEDURE — 99024 POSTOP FOLLOW-UP VISIT: CPT | Performed by: OBSTETRICS & GYNECOLOGY

## 2019-06-22 PROCEDURE — NC001 PR NO CHARGE: Performed by: OBSTETRICS & GYNECOLOGY

## 2019-06-22 RX ORDER — DIAPER,BRIEF,INFANT-TODD,DISP
1 EACH MISCELLANEOUS AS NEEDED
Qty: 30 G | Refills: 0 | Status: SHIPPED | OUTPATIENT
Start: 2019-06-22 | End: 2019-06-23

## 2019-06-22 RX ORDER — ACETAMINOPHEN 325 MG/1
650 TABLET ORAL EVERY 4 HOURS PRN
Qty: 30 TABLET | Refills: 0 | Status: SHIPPED | OUTPATIENT
Start: 2019-06-22 | End: 2019-06-23

## 2019-06-22 RX ORDER — IBUPROFEN 600 MG/1
600 TABLET ORAL EVERY 6 HOURS PRN
Qty: 30 TABLET | Refills: 0 | Status: SHIPPED | OUTPATIENT
Start: 2019-06-22 | End: 2019-06-23

## 2019-06-22 RX ADMIN — IBUPROFEN 600 MG: 600 TABLET ORAL at 20:25

## 2019-06-22 RX ADMIN — DOCUSATE SODIUM 100 MG: 100 CAPSULE, LIQUID FILLED ORAL at 18:49

## 2019-06-23 VITALS
WEIGHT: 165 LBS | HEIGHT: 70 IN | HEART RATE: 77 BPM | TEMPERATURE: 98.3 F | DIASTOLIC BLOOD PRESSURE: 69 MMHG | RESPIRATION RATE: 18 BRPM | SYSTOLIC BLOOD PRESSURE: 105 MMHG | BODY MASS INDEX: 23.62 KG/M2 | OXYGEN SATURATION: 100 %

## 2019-06-23 PROCEDURE — 99024 POSTOP FOLLOW-UP VISIT: CPT | Performed by: OBSTETRICS & GYNECOLOGY

## 2019-06-23 PROCEDURE — NC001 PR NO CHARGE: Performed by: OBSTETRICS & GYNECOLOGY

## 2019-06-23 RX ORDER — IBUPROFEN 600 MG/1
600 TABLET ORAL EVERY 6 HOURS PRN
Qty: 30 TABLET | Refills: 0 | Status: SHIPPED | OUTPATIENT
Start: 2019-06-23 | End: 2020-05-16 | Stop reason: HOSPADM

## 2019-06-23 RX ORDER — IBUPROFEN 600 MG/1
600 TABLET ORAL EVERY 6 HOURS PRN
Qty: 30 TABLET | Refills: 0 | Status: SHIPPED | OUTPATIENT
Start: 2019-06-23 | End: 2019-06-23

## 2019-06-23 RX ORDER — MEDROXYPROGESTERONE ACETATE 150 MG/ML
150 INJECTION, SUSPENSION INTRAMUSCULAR ONCE
Status: COMPLETED | OUTPATIENT
Start: 2019-06-23 | End: 2019-06-23

## 2019-06-23 RX ORDER — DIAPER,BRIEF,INFANT-TODD,DISP
1 EACH MISCELLANEOUS AS NEEDED
Qty: 30 G | Refills: 0
Start: 2019-06-23 | End: 2020-05-16 | Stop reason: HOSPADM

## 2019-06-23 RX ORDER — ACETAMINOPHEN 325 MG/1
650 TABLET ORAL EVERY 4 HOURS PRN
Qty: 30 TABLET | Refills: 0
Start: 2019-06-23 | End: 2020-05-21

## 2019-06-23 RX ADMIN — MEDROXYPROGESTERONE ACETATE 150 MG: 150 INJECTION, SUSPENSION, EXTENDED RELEASE INTRAMUSCULAR at 09:46

## 2019-06-23 RX ADMIN — Medication 1 TABLET: at 08:05

## 2019-06-23 RX ADMIN — FERROUS SULFATE TAB 325 MG (65 MG ELEMENTAL FE) 325 MG: 325 (65 FE) TAB at 08:05

## 2019-06-23 RX ADMIN — DOCUSATE SODIUM 100 MG: 100 CAPSULE, LIQUID FILLED ORAL at 08:05

## 2019-06-24 LAB — RPR SER QL: NORMAL

## 2019-06-29 LAB — PLACENTA IN STORAGE: NORMAL

## 2019-07-12 ENCOUNTER — POSTPARTUM VISIT (OUTPATIENT)
Dept: OBGYN CLINIC | Facility: CLINIC | Age: 24
End: 2019-07-12

## 2019-07-12 VITALS — SYSTOLIC BLOOD PRESSURE: 102 MMHG | DIASTOLIC BLOOD PRESSURE: 66 MMHG | HEART RATE: 54 BPM

## 2019-07-12 DIAGNOSIS — Z30.42 ENCOUNTER FOR SURVEILLANCE OF INJECTABLE CONTRACEPTIVE: ICD-10-CM

## 2019-07-12 PROBLEM — Z3A.38 38 WEEKS GESTATION OF PREGNANCY: Status: RESOLVED | Noted: 2019-01-24 | Resolved: 2019-07-12

## 2019-07-12 PROBLEM — Z34.93 PRENATAL CARE IN THIRD TRIMESTER: Status: RESOLVED | Noted: 2019-01-24 | Resolved: 2019-07-12

## 2019-07-12 PROCEDURE — 99214 OFFICE O/P EST MOD 30 MIN: CPT | Performed by: NURSE PRACTITIONER

## 2019-07-12 RX ORDER — MEDROXYPROGESTERONE ACETATE 150 MG/ML
150 INJECTION, SUSPENSION INTRAMUSCULAR
Qty: 1 ML | Refills: 5 | Status: SHIPPED | OUTPATIENT
Start: 2019-07-12 | End: 2020-05-16 | Stop reason: HOSPADM

## 2019-07-12 NOTE — LETTER
[address] [facility]  [address]  [address]  [phone]  Dept: [phone]    July 12, 2019     Patient: Pricila Johnson   YOB: 1995   Date of Visit: 7/12/2019       To Whom it May Concern: Cassandra Ventura is under my professional care  She was seen in the [facility] 7/12/2019 for her postpartum visit  Erickayla Burkitt may continue to work but may not lift more than 20 lbs until 6 weeks postpartum     If you have any questions or concerns, please don't hesitate to call           Sincerely,          AMA Kennedy

## 2019-07-12 NOTE — PROGRESS NOTES
OB POSTPARTUM VISIT PROGRESS NOTE  Date of Encounter: 2019    Xiomara Myers    : 1995  (22 y o )  MR: [id number]    Subjective   Si How is in for her postpartum visit  She is now [id number]  She delivered by normal spontaneous vaginal delivery  She's generally doing well, denies current pain or bleeding issues, and has no significant depression issues  She is breast feeding with some supplementation  We discussed all appropriate contraceptive options and she chooses Depoprovera, had first dose in the hospital   Denies problem with urinating or BM's  Aware exercise and intercourswe should not resume until 6 weeks postpartum  Objective   EXAM:  GENERAL: alert, well appearing, and in no distress  VITALS: Blood pressure 102/66, pulse (!) 54, last menstrual period 2018, currently breastfeeding  BMI: There is no height or weight on file to calculate BMI  Denies pain  BREASTS: Denies pain, redness or lumps breastfeeding and pumping  :   EXTREMITIES: Denies pain, redness or edema    Assessment/Plan   Diagnoses and all orders for this visit:    Spontaneous vaginal delivery    Encounter for surveillance of injectable contraceptive  -     medroxyPROGESTERone (DEPO-PROVERA) 150 mg/mL injection; Inject 1 mL (150 mg total) into a muscle every 3 (three) months        Plan  Patient Instructions   Return for next Depoprovera  Call with needs or concerns  Return in [facility] for annual GYN     Return in about 2 months (around 2019) for next Depoprovera      AMA Abraham

## 2019-11-03 ENCOUNTER — HOSPITAL ENCOUNTER (EMERGENCY)
Facility: HOSPITAL | Age: 24
Discharge: HOME | End: 2019-11-03
Attending: EMERGENCY MEDICINE
Payer: COMMERCIAL

## 2019-11-03 VITALS
RESPIRATION RATE: 18 BRPM | SYSTOLIC BLOOD PRESSURE: 128 MMHG | BODY MASS INDEX: 25.65 KG/M2 | TEMPERATURE: 98.1 F | WEIGHT: 178.79 LBS | OXYGEN SATURATION: 100 % | DIASTOLIC BLOOD PRESSURE: 60 MMHG | HEART RATE: 71 BPM

## 2019-11-03 DIAGNOSIS — M54.50 ACUTE LOW BACK PAIN: Primary | ICD-10-CM

## 2019-11-03 LAB
EXT PREG TEST URINE: NEGATIVE
EXT. CONTROL ED NAV: NORMAL

## 2019-11-03 PROCEDURE — 81025 URINE PREGNANCY TEST: CPT | Performed by: PHYSICIAN ASSISTANT

## 2019-11-03 PROCEDURE — 96372 THER/PROPH/DIAG INJ SC/IM: CPT

## 2019-11-03 PROCEDURE — 99283 EMERGENCY DEPT VISIT LOW MDM: CPT

## 2019-11-03 PROCEDURE — 99284 EMERGENCY DEPT VISIT MOD MDM: CPT | Performed by: PHYSICIAN ASSISTANT

## 2019-11-03 RX ORDER — NAPROXEN 500 MG/1
500 TABLET ORAL 2 TIMES DAILY WITH MEALS
Qty: 10 TABLET | Refills: 0 | Status: SHIPPED | OUTPATIENT
Start: 2019-11-03 | End: 2020-05-16 | Stop reason: HOSPADM

## 2019-11-03 RX ORDER — CYCLOBENZAPRINE HCL 10 MG
5 TABLET ORAL 2 TIMES DAILY PRN
Qty: 10 TABLET | Refills: 0 | Status: SHIPPED | OUTPATIENT
Start: 2019-11-03 | End: 2020-05-21

## 2019-11-03 RX ORDER — CYCLOBENZAPRINE HCL 10 MG
5 TABLET ORAL 2 TIMES DAILY PRN
Qty: 10 TABLET | Refills: 0 | Status: SHIPPED | OUTPATIENT
Start: 2019-11-03 | End: 2019-11-03 | Stop reason: SDUPTHER

## 2019-11-03 RX ORDER — NAPROXEN 500 MG/1
500 TABLET ORAL 2 TIMES DAILY WITH MEALS
Qty: 10 TABLET | Refills: 0 | Status: SHIPPED | OUTPATIENT
Start: 2019-11-03 | End: 2019-11-03 | Stop reason: SDUPTHER

## 2019-11-03 RX ORDER — KETOROLAC TROMETHAMINE 30 MG/ML
15 INJECTION, SOLUTION INTRAMUSCULAR; INTRAVENOUS ONCE
Status: COMPLETED | OUTPATIENT
Start: 2019-11-03 | End: 2019-11-03

## 2019-11-03 RX ADMIN — KETOROLAC TROMETHAMINE 15 MG: 30 INJECTION, SOLUTION INTRAMUSCULAR at 16:38

## 2019-11-07 NOTE — ED PROVIDER NOTES
History  Chief Complaint   Patient presents with    Back Pain     Pt reports mid lower back pain after reaching forward and standing  Pain began this morning  No meds taken for pain  22year old female presents today complaining of low back pain that began after she bent over to pick something up  The pain does not radiate  Has never had pain back like this before  Has not tried taking anything for it  Denies fevers, chills, headaches, nausea, vomiting, diarrhea  No chest pain, shortness of breath, abdominal pain, dysuria  Pt is 5 months postpartum  She is not breastfeeding  Prior to Admission Medications   Prescriptions Last Dose Informant Patient Reported? Taking?    Prenatal Vit-Fe Fumarate-FA (GOODSENSE PRENATAL VITAMINS) 28-0 8 MG TABS  Self No No   Sig: Take 1 tablet by mouth daily   Patient not taking: Reported on 7/12/2019   Prenatal Vit-Fe Fumarate-FA (PRENATAL VITAMIN) 28-0 8 mg  Self No No   Sig: Take 1 tablet by mouth daily   Patient not taking: Reported on 7/12/2019   acetaminophen (TYLENOL) 325 mg tablet   No No   Sig: Take 2 tablets (650 mg total) by mouth every 4 (four) hours as needed for mild pain   Patient not taking: Reported on 7/12/2019   benzocaine-menthol-lanolin-aloe (DERMOPLAST) 20-0 5 % topical spray   No No   Sig: Apply 1 application topically 4 (four) times a day as needed (topically as needed for mild pain)   Patient not taking: Reported on 7/12/2019   docusate sodium (COLACE) 100 mg capsule  Self No No   Sig: Take 1 capsule (100 mg total) by mouth 2 (two) times a day   Patient not taking: Reported on 7/12/2019   docusate sodium (COLACE) 100 mg capsule  Self No No   Sig: Take 1 capsule (100 mg total) by mouth 2 (two) times a day   Patient not taking: Reported on 7/12/2019   ferrous sulfate 324 (65 Fe) mg  Self No No   Sig: Take 1 tablet (324 mg total) by mouth 2 (two) times a day before meals   Patient not taking: Reported on 7/12/2019   ferrous sulfate 324 (65 Fe) mg Self No No   Sig: Take 1 tablet (324 mg total) by mouth 2 (two) times a day before meals   Patient not taking: Reported on 7/12/2019   hydrocortisone 1 % cream   No No   Sig: Apply 1 application topically as needed for irritation   Patient not taking: Reported on 7/12/2019   ibuprofen (MOTRIN) 600 mg tablet   No No   Sig: Take 1 tablet (600 mg total) by mouth every 6 (six) hours as needed for moderate pain   Patient not taking: Reported on 7/12/2019   medroxyPROGESTERone (DEPO-PROVERA) 150 mg/mL injection   No Yes   Sig: Inject 1 mL (150 mg total) into a muscle every 3 (three) months   witch hazel-glycerin (TUCKS) topical pad   No No   Sig: Apply 1 pad topically as needed for irritation   Patient not taking: Reported on 7/12/2019      Facility-Administered Medications: None       Past Medical History:   Diagnosis Date    Migraine     Miscarriage        History reviewed  No pertinent surgical history  Family History   Problem Relation Age of Onset    No Known Problems Mother     No Known Problems Father     No Known Problems Sister     No Known Problems Brother     No Known Problems Daughter      I have reviewed and agree with the history as documented  Social History     Tobacco Use    Smoking status: Never Smoker    Smokeless tobacco: Never Used   Substance Use Topics    Alcohol use: No    Drug use: No        Review of Systems   Musculoskeletal: Positive for back pain  All other systems reviewed and are negative  Physical Exam  Physical Exam   Constitutional: She appears well-developed and well-nourished  No distress  HENT:   Head: Normocephalic and atraumatic  Eyes: Conjunctivae are normal    Neck: Normal range of motion  Cardiovascular: Normal rate, regular rhythm and normal heart sounds  Pulmonary/Chest: Effort normal and breath sounds normal  No stridor  No respiratory distress  She has no wheezes  Abdominal: Soft  She exhibits no distension  There is no tenderness   There is no guarding  Musculoskeletal:   Bilateral paraspinal muscular tenderness to palpation  No spinous process tenderness  No deformities or stepoffs  No rashes, lesions or erythema  Full range of motion with some discomfort  5/5 strength bilateral lower extremities  Distal pulses and sensation intact  Neurological: She is alert  Skin: Skin is warm and dry  Capillary refill takes less than 2 seconds  She is not diaphoretic  No erythema  Psychiatric: She has a normal mood and affect   Her behavior is normal        Vital Signs  ED Triage Vitals [11/03/19 1618]   Temperature Pulse Respirations Blood Pressure SpO2   98 1 °F (36 7 °C) 71 18 128/60 100 %      Temp Source Heart Rate Source Patient Position - Orthostatic VS BP Location FiO2 (%)   Temporal Monitor Sitting Right arm --      Pain Score       7           Vitals:    11/03/19 1618   BP: 128/60   Pulse: 71   Patient Position - Orthostatic VS: Sitting         Visual Acuity      ED Medications  Medications   ketorolac (TORADOL) injection 15 mg (15 mg Intramuscular Given 11/3/19 1638)       Diagnostic Studies  Results Reviewed     Procedure Component Value Units Date/Time    POCT pregnancy, urine [[id number]]  (Normal) Resulted:  11/03/19 1638    Lab Status:  Final result Updated:  11/03/19 1638     EXT PREG TEST UR (Ref: Negative) Negative     Control Valid                 No orders to display              Procedures  Procedures       ED Course                               MDM  Number of Diagnoses or Management Options  Acute low back pain:       Disposition  Final diagnoses:   Acute low back pain     Time reflects when diagnosis was documented in both MDM as applicable and the Disposition within this note     Time User Action Codes Description Comment    11/3/2019  4:40 PM Tio Kumar Add [M54 5] Acute low back pain       ED Disposition     ED Disposition Condition Date/Time Comment    Discharge Stable Sun Nov 3, 2019  4:40 PM Akira Reynolds discharge to home/self care              Follow-up Information     Follow up With Specialties Details Why [facility] [place],  Family Medicine Schedule an appointment as soon as possible for a visit   [address] [place] [address]  [phone]            Discharge Medication List as of 11/3/2019  4:42 PM      START taking these medications    Details   cyclobenzaprine (FLEXERIL) 10 mg tablet Take 0 5 tablets (5 mg total) by mouth 2 (two) times a day as needed for muscle spasms, Starting Sun 11/3/2019, Print      naproxen (NAPROSYN) 500 mg tablet Take 1 tablet (500 mg total) by mouth 2 (two) times a day with meals for 5 days, Starting Sun 11/3/2019, Until Fri 11/8/2019, Print         CONTINUE these medications which have NOT CHANGED    Details   medroxyPROGESTERone (DEPO-PROVERA) 150 mg/mL injection Inject 1 mL (150 mg total) into a muscle every 3 (three) months, Starting Fri 7/12/2019, Normal      acetaminophen (TYLENOL) 325 mg tablet Take 2 tablets (650 mg total) by mouth every 4 (four) hours as needed for mild pain, Starting Sun 6/23/2019, No Print      benzocaine-menthol-lanolin-aloe (DERMOPLAST) 20-0 5 % topical spray Apply 1 application topically 4 (four) times a day as needed (topically as needed for mild pain), Starting Sun 6/23/2019, No Print      !! docusate sodium (COLACE) 100 mg capsule Take 1 capsule (100 mg total) by mouth 2 (two) times a day, Starting Tue 2/5/2019, Print      !! docusate sodium (COLACE) 100 mg capsule Take 1 capsule (100 mg total) by mouth 2 (two) times a day, Starting Tue 2/5/2019, Normal      !! ferrous sulfate 324 (65 Fe) mg Take 1 tablet (324 mg total) by mouth 2 (two) times a day before meals, Starting Tue 2/5/2019, Print      !! ferrous sulfate 324 (65 Fe) mg Take 1 tablet (324 mg total) by mouth 2 (two) times a day before meals, Starting Tue 2/5/2019, Normal      hydrocortisone 1 % cream Apply 1 application topically as needed for irritation, Starting Sun 6/23/2019, No Print      ibuprofen (MOTRIN) 600 mg tablet Take 1 tablet (600 mg total) by mouth every 6 (six) hours as needed for moderate pain, Starting Sun 6/23/2019, Normal      !! Prenatal Vit-Fe Fumarate-FA (GOODSENSE PRENATAL VITAMINS) 28-0 8 MG TABS Take 1 tablet by mouth daily, Starting Tue 2/5/2019, Normal      !! Prenatal Vit-Fe Fumarate-FA (PRENATAL VITAMIN) 28-0 8 mg Take 1 tablet by mouth daily, Starting Tue 2/5/2019, Print      witch hazel-glycerin (TUCKS) topical pad Apply 1 pad topically as needed for irritation, Starting Sun 6/23/2019, No Print       !! - Potential duplicate medications found  Please discuss with provider  No discharge procedures on file      ED Provider  Electronically Signed by           Vince Cooper PA-C  11/07/19 2675

## 2019-12-16 ENCOUNTER — CLINICAL SUPPORT (OUTPATIENT)
Dept: OBGYN CLINIC | Facility: CLINIC | Age: 24
End: 2019-12-16

## 2019-12-16 VITALS
HEART RATE: 94 BPM | WEIGHT: 181 LBS | DIASTOLIC BLOOD PRESSURE: 77 MMHG | BODY MASS INDEX: 25.97 KG/M2 | SYSTOLIC BLOOD PRESSURE: 120 MMHG

## 2019-12-16 DIAGNOSIS — Z30.42 ENCOUNTER FOR SURVEILLANCE OF INJECTABLE CONTRACEPTIVE: Primary | ICD-10-CM

## 2019-12-16 LAB — SL AMB POCT URINE HCG: NEGATIVE

## 2019-12-16 PROCEDURE — 96372 THER/PROPH/DIAG INJ SC/IM: CPT

## 2019-12-16 PROCEDURE — 81025 URINE PREGNANCY TEST: CPT

## 2019-12-16 RX ORDER — MEDROXYPROGESTERONE ACETATE 150 MG/ML
150 INJECTION, SUSPENSION INTRAMUSCULAR ONCE
Status: COMPLETED | OUTPATIENT
Start: 2019-12-16 | End: 2019-12-16

## 2019-12-16 RX ADMIN — MEDROXYPROGESTERONE ACETATE 150 MG: 150 INJECTION, SUSPENSION INTRAMUSCULAR at 15:03

## 2020-02-08 ENCOUNTER — TELEPHONE (OUTPATIENT)
Dept: OTHER | Facility: OTHER | Age: 25
End: 2020-02-08

## 2020-02-08 ENCOUNTER — TELEPHONE (OUTPATIENT)
Dept: LABOR AND DELIVERY | Facility: HOSPITAL | Age: 25
End: 2020-02-08

## 2020-02-08 NOTE — TELEPHONE ENCOUNTER
Patient call the answering service complaining of abdominal pain  She was concerned that this was related to her Depo shot  Patient was called back  I explained to the patient that she received her last Depo Shot in December of 2019, and that her abdominal pain is not likely related to receiving that shot  I reassured her that it is normal to not have periods while receiving the depo shot  I encouraged her to try a heating pad, warm shower, and to make sure she is keeping her bowels regular with fiber supplements  Patient was willing to try all these options

## 2020-05-16 ENCOUNTER — HOSPITAL ENCOUNTER (EMERGENCY)
Facility: HOSPITAL | Age: 25
Discharge: HOME | End: 2020-05-16
Attending: EMERGENCY MEDICINE | Admitting: OBSTETRICS & GYNECOLOGY
Payer: COMMERCIAL

## 2020-05-16 VITALS
OXYGEN SATURATION: 100 % | TEMPERATURE: 98.6 F | SYSTOLIC BLOOD PRESSURE: 117 MMHG | HEIGHT: 70 IN | HEART RATE: 71 BPM | BODY MASS INDEX: 25.05 KG/M2 | WEIGHT: 175 LBS | DIASTOLIC BLOOD PRESSURE: 56 MMHG | RESPIRATION RATE: 16 BRPM

## 2020-05-16 DIAGNOSIS — Z34.90 PREGNANCY, UNSPECIFIED GESTATIONAL AGE: ICD-10-CM

## 2020-05-16 DIAGNOSIS — Z34.92 PREGNANT AND NOT YET DELIVERED IN SECOND TRIMESTER: Primary | ICD-10-CM

## 2020-05-16 DIAGNOSIS — R10.30 LOWER ABDOMINAL PAIN: ICD-10-CM

## 2020-05-16 LAB
BACTERIA UR QL AUTO: ABNORMAL /HPF
BILIRUB UR QL STRIP: NEGATIVE
CLARITY UR: CLEAR
COLOR UR: YELLOW
EXT PREG TEST URINE: POSITIVE
EXT. CONTROL ED NAV: ABNORMAL
GLUCOSE UR STRIP-MCNC: NEGATIVE MG/DL
HGB UR QL STRIP.AUTO: NEGATIVE
KETONES UR STRIP-MCNC: NEGATIVE MG/DL
LEUKOCYTE ESTERASE UR QL STRIP: ABNORMAL
NITRITE UR QL STRIP: NEGATIVE
NON-SQ EPI CELLS URNS QL MICRO: ABNORMAL /HPF
PH UR STRIP.AUTO: 8.5 [PH] (ref 4.5–8)
PROT UR STRIP-MCNC: NEGATIVE MG/DL
RBC #/AREA URNS AUTO: ABNORMAL /HPF
SP GR UR STRIP.AUTO: 1.02 (ref 1–1.03)
UROBILINOGEN UR QL STRIP.AUTO: 1 E.U./DL
WBC #/AREA URNS AUTO: ABNORMAL /HPF

## 2020-05-16 PROCEDURE — 99284 EMERGENCY DEPT VISIT MOD MDM: CPT

## 2020-05-16 PROCEDURE — 87086 URINE CULTURE/COLONY COUNT: CPT

## 2020-05-16 PROCEDURE — NC001 PR NO CHARGE: Performed by: OBSTETRICS & GYNECOLOGY

## 2020-05-16 PROCEDURE — 76815 OB US LIMITED FETUS(S): CPT | Performed by: EMERGENCY MEDICINE

## 2020-05-16 PROCEDURE — 99213 OFFICE O/P EST LOW 20 MIN: CPT

## 2020-05-16 PROCEDURE — 81001 URINALYSIS AUTO W/SCOPE: CPT

## 2020-05-16 PROCEDURE — 99284 EMERGENCY DEPT VISIT MOD MDM: CPT | Performed by: EMERGENCY MEDICINE

## 2020-05-16 PROCEDURE — 81025 URINE PREGNANCY TEST: CPT | Performed by: EMERGENCY MEDICINE

## 2020-05-17 LAB — BACTERIA UR CULT: NORMAL

## 2020-05-18 ENCOUNTER — TELEPHONE (OUTPATIENT)
Dept: PERINATAL CARE | Facility: CLINIC | Age: 25
End: 2020-05-18

## 2020-05-19 ENCOUNTER — ROUTINE PRENATAL (OUTPATIENT)
Dept: PERINATAL CARE | Facility: OTHER | Age: 25
End: 2020-05-19
Payer: COMMERCIAL

## 2020-05-19 ENCOUNTER — TELEPHONE (OUTPATIENT)
Dept: PERINATAL CARE | Facility: OTHER | Age: 25
End: 2020-05-19

## 2020-05-19 VITALS
BODY MASS INDEX: 28.12 KG/M2 | HEIGHT: 70 IN | WEIGHT: 196.4 LBS | DIASTOLIC BLOOD PRESSURE: 73 MMHG | HEART RATE: 85 BPM | TEMPERATURE: 98.6 F | SYSTOLIC BLOOD PRESSURE: 116 MMHG

## 2020-05-19 DIAGNOSIS — Z34.92 PREGNANT AND NOT YET DELIVERED IN SECOND TRIMESTER: ICD-10-CM

## 2020-05-19 DIAGNOSIS — Z36.89 ENCOUNTER FOR FETAL ANATOMIC SURVEY: ICD-10-CM

## 2020-05-19 DIAGNOSIS — Z3A.26 26 WEEKS GESTATION OF PREGNANCY: Primary | ICD-10-CM

## 2020-05-19 PROBLEM — Z30.42 ENCOUNTER FOR SURVEILLANCE OF INJECTABLE CONTRACEPTIVE: Status: RESOLVED | Noted: 2019-07-12 | Resolved: 2020-05-19

## 2020-05-19 PROCEDURE — 99241 PR OFFICE CONSULTATION NEW/ESTAB PATIENT 15 MIN: CPT | Performed by: OBSTETRICS & GYNECOLOGY

## 2020-05-19 PROCEDURE — 76805 OB US >/= 14 WKS SNGL FETUS: CPT | Performed by: OBSTETRICS & GYNECOLOGY

## 2020-05-20 ENCOUNTER — TELEMEDICINE (OUTPATIENT)
Dept: OBGYN CLINIC | Facility: CLINIC | Age: 25
End: 2020-05-20

## 2020-05-20 ENCOUNTER — TELEPHONE (OUTPATIENT)
Dept: OBGYN CLINIC | Facility: CLINIC | Age: 25
End: 2020-05-20

## 2020-05-20 ENCOUNTER — PATIENT OUTREACH (OUTPATIENT)
Dept: OBGYN CLINIC | Facility: CLINIC | Age: 25
End: 2020-05-20

## 2020-05-20 DIAGNOSIS — Z3A.26 26 WEEKS GESTATION OF PREGNANCY: Primary | ICD-10-CM

## 2020-05-20 PROCEDURE — T1001 NURSING ASSESSMENT/EVALUATN: HCPCS

## 2020-05-20 PROCEDURE — T1015 CLINIC SERVICE: HCPCS

## 2020-05-21 ENCOUNTER — APPOINTMENT (OUTPATIENT)
Dept: LAB | Facility: HOSPITAL | Age: 25
End: 2020-05-21
Attending: OBSTETRICS & GYNECOLOGY
Payer: COMMERCIAL

## 2020-05-21 ENCOUNTER — INITIAL PRENATAL (OUTPATIENT)
Dept: OBGYN CLINIC | Facility: CLINIC | Age: 25
End: 2020-05-21

## 2020-05-21 VITALS
SYSTOLIC BLOOD PRESSURE: 130 MMHG | HEART RATE: 96 BPM | WEIGHT: 198.2 LBS | BODY MASS INDEX: 28.37 KG/M2 | DIASTOLIC BLOOD PRESSURE: 77 MMHG | TEMPERATURE: 99.1 F | HEIGHT: 70 IN

## 2020-05-21 DIAGNOSIS — O99.810 ABNORMAL GLUCOSE AFFECTING PREGNANCY: ICD-10-CM

## 2020-05-21 DIAGNOSIS — Z3A.26 26 WEEKS GESTATION OF PREGNANCY: Primary | ICD-10-CM

## 2020-05-21 DIAGNOSIS — O09.899 SHORT INTERVAL BETWEEN PREGNANCIES AFFECTING PREGNANCY, ANTEPARTUM: ICD-10-CM

## 2020-05-21 DIAGNOSIS — Z3A.26 26 WEEKS GESTATION OF PREGNANCY: ICD-10-CM

## 2020-05-21 PROBLEM — Z36.9 ENCOUNTER FOR FETAL ULTRASOUND: Status: ACTIVE | Noted: 2020-05-21

## 2020-05-21 PROBLEM — Z13.79 GENETIC SCREENING: Status: ACTIVE | Noted: 2020-05-21

## 2020-05-21 PROBLEM — Z30.9 CONTRACEPTION MANAGEMENT: Status: ACTIVE | Noted: 2020-05-21

## 2020-05-21 LAB
ABO GROUP BLD: NORMAL
BASOPHILS # BLD AUTO: 0.03 THOUSANDS/ΜL (ref 0–0.1)
BASOPHILS NFR BLD AUTO: 0 % (ref 0–1)
BILIRUB UR QL STRIP: NEGATIVE
BLD GP AB SCN SERPL QL: NEGATIVE
CLARITY UR: CLEAR
COLOR UR: YELLOW
EOSINOPHIL # BLD AUTO: 0.1 THOUSAND/ΜL (ref 0–0.61)
EOSINOPHIL NFR BLD AUTO: 1 % (ref 0–6)
ERYTHROCYTE [DISTWIDTH] IN BLOOD BY AUTOMATED COUNT: 14.4 % (ref 11.6–15.1)
GLUCOSE 1H P 50 G GLC PO SERPL-MCNC: 150 MG/DL
GLUCOSE UR STRIP-MCNC: ABNORMAL MG/DL
HCT VFR BLD AUTO: 35.8 % (ref 34.8–46.1)
HGB BLD-MCNC: 11.4 G/DL (ref 11.5–15.4)
HGB UR QL STRIP.AUTO: NEGATIVE
IMM GRANULOCYTES # BLD AUTO: 0.1 THOUSAND/UL (ref 0–0.2)
IMM GRANULOCYTES NFR BLD AUTO: 1 % (ref 0–2)
KETONES UR STRIP-MCNC: NEGATIVE MG/DL
LEUKOCYTE ESTERASE UR QL STRIP: NEGATIVE
LYMPHOCYTES # BLD AUTO: 2.1 THOUSANDS/ΜL (ref 0.6–4.47)
LYMPHOCYTES NFR BLD AUTO: 19 % (ref 14–44)
MCH RBC QN AUTO: 28.9 PG (ref 26.8–34.3)
MCHC RBC AUTO-ENTMCNC: 31.8 G/DL (ref 31.4–37.4)
MCV RBC AUTO: 91 FL (ref 82–98)
MONOCYTES # BLD AUTO: 0.47 THOUSAND/ΜL (ref 0.17–1.22)
MONOCYTES NFR BLD AUTO: 4 % (ref 4–12)
NEUTROPHILS # BLD AUTO: 8.38 THOUSANDS/ΜL (ref 1.85–7.62)
NEUTS SEG NFR BLD AUTO: 75 % (ref 43–75)
NITRITE UR QL STRIP: NEGATIVE
NRBC BLD AUTO-RTO: 0 /100 WBCS
PH UR STRIP.AUTO: 6.5 [PH]
PLATELET # BLD AUTO: 288 THOUSANDS/UL (ref 149–390)
PMV BLD AUTO: 10.2 FL (ref 8.9–12.7)
PROT UR STRIP-MCNC: NEGATIVE MG/DL
RBC # BLD AUTO: 3.95 MILLION/UL (ref 3.81–5.12)
RH BLD: POSITIVE
SP GR UR STRIP.AUTO: 1.02 (ref 1–1.03)
SPECIMEN EXPIRATION DATE: NORMAL
UROBILINOGEN UR QL STRIP.AUTO: 0.2 E.U./DL
WBC # BLD AUTO: 11.18 THOUSAND/UL (ref 4.31–10.16)

## 2020-05-21 PROCEDURE — 36415 COLL VENOUS BLD VENIPUNCTURE: CPT

## 2020-05-21 PROCEDURE — 86803 HEPATITIS C AB TEST: CPT

## 2020-05-21 PROCEDURE — 87086 URINE CULTURE/COLONY COUNT: CPT

## 2020-05-21 PROCEDURE — 82950 GLUCOSE TEST: CPT

## 2020-05-21 PROCEDURE — 80081 OBSTETRIC PANEL INC HIV TSTG: CPT

## 2020-05-21 PROCEDURE — T1015 CLINIC SERVICE: HCPCS | Performed by: NURSE PRACTITIONER

## 2020-05-21 PROCEDURE — 87591 N.GONORRHOEAE DNA AMP PROB: CPT | Performed by: NURSE PRACTITIONER

## 2020-05-21 PROCEDURE — 81003 URINALYSIS AUTO W/O SCOPE: CPT

## 2020-05-21 PROCEDURE — 99213 OFFICE O/P EST LOW 20 MIN: CPT | Performed by: NURSE PRACTITIONER

## 2020-05-21 PROCEDURE — 83020 HEMOGLOBIN ELECTROPHORESIS: CPT

## 2020-05-21 PROCEDURE — 87491 CHLMYD TRACH DNA AMP PROBE: CPT | Performed by: NURSE PRACTITIONER

## 2020-05-21 RX ORDER — CALCIUM CARBONATE 500(1250)
1 TABLET ORAL DAILY
Qty: 90 TABLET | Refills: 3 | Status: SHIPPED | OUTPATIENT
Start: 2020-05-21 | End: 2020-06-18

## 2020-05-22 LAB
BACTERIA UR CULT: NORMAL
C TRACH DNA SPEC QL NAA+PROBE: NEGATIVE
HBV SURFACE AG SER QL: NORMAL
HCV AB SER QL: NORMAL
HIV 1+2 AB+HIV1 P24 AG SERPL QL IA: NORMAL
N GONORRHOEA DNA SPEC QL NAA+PROBE: NEGATIVE
RPR SER QL: NORMAL
RUBV IGG SERPL IA-ACNC: 23.7 IU/ML

## 2020-05-26 LAB
DEPRECATED HGB OTHER BLD-IMP: 0 %
HGB A MFR BLD: 2.2 % (ref 1.8–3.2)
HGB A MFR BLD: 97.8 % (ref 96.4–98.8)
HGB C MFR BLD: 0 %
HGB F MFR BLD: 0 % (ref 0–2)
HGB FRACT BLD-IMP: NORMAL
HGB S BLD QL SOLY: NEGATIVE
HGB S MFR BLD: 0 %

## 2020-05-27 DIAGNOSIS — R73.09 ELEVATED GLUCOSE: Primary | ICD-10-CM

## 2020-06-03 ENCOUNTER — TELEPHONE (OUTPATIENT)
Dept: OBGYN CLINIC | Facility: CLINIC | Age: 25
End: 2020-06-03

## 2020-06-03 LAB
GLUCOSE 1H P GLC SERPL-MCNC: 140 MG/DL
GLUCOSE 2H P 75 G GLC PO SERPL-MCNC: 125 MG/DL
GLUCOSE 3H P 100 G GLC PO SERPL-MCNC: 91 MG/DL
GLUCOSE P FAST SERPL-MCNC: 79 MG/DL

## 2020-06-04 ENCOUNTER — ROUTINE PRENATAL (OUTPATIENT)
Dept: OBGYN CLINIC | Facility: CLINIC | Age: 25
End: 2020-06-04

## 2020-06-04 VITALS
SYSTOLIC BLOOD PRESSURE: 124 MMHG | HEART RATE: 88 BPM | TEMPERATURE: 97.8 F | WEIGHT: 198 LBS | DIASTOLIC BLOOD PRESSURE: 77 MMHG | BODY MASS INDEX: 28.41 KG/M2

## 2020-06-04 DIAGNOSIS — Z3A.28 28 WEEKS GESTATION OF PREGNANCY: Primary | ICD-10-CM

## 2020-06-04 DIAGNOSIS — O09.30 LATE PRENATAL CARE: ICD-10-CM

## 2020-06-04 DIAGNOSIS — O09.899 SHORT INTERVAL BETWEEN PREGNANCIES AFFECTING PREGNANCY, ANTEPARTUM: ICD-10-CM

## 2020-06-04 DIAGNOSIS — R73.02 GLUCOSE INTOLERANCE (IMPAIRED GLUCOSE TOLERANCE): ICD-10-CM

## 2020-06-04 PROCEDURE — PNV: Performed by: OBSTETRICS & GYNECOLOGY

## 2020-06-04 PROCEDURE — 90471 IMMUNIZATION ADMIN: CPT | Performed by: OBSTETRICS & GYNECOLOGY

## 2020-06-04 PROCEDURE — 90715 TDAP VACCINE 7 YRS/> IM: CPT | Performed by: OBSTETRICS & GYNECOLOGY

## 2020-06-17 ENCOUNTER — TELEPHONE (OUTPATIENT)
Dept: OBGYN CLINIC | Facility: CLINIC | Age: 25
End: 2020-06-17

## 2020-06-18 ENCOUNTER — ROUTINE PRENATAL (OUTPATIENT)
Dept: OBGYN CLINIC | Facility: CLINIC | Age: 25
End: 2020-06-18

## 2020-06-18 VITALS
DIASTOLIC BLOOD PRESSURE: 69 MMHG | TEMPERATURE: 96 F | HEART RATE: 76 BPM | WEIGHT: 204.2 LBS | BODY MASS INDEX: 29.3 KG/M2 | SYSTOLIC BLOOD PRESSURE: 114 MMHG

## 2020-06-18 DIAGNOSIS — Z3A.30 30 WEEKS GESTATION OF PREGNANCY: Primary | ICD-10-CM

## 2020-06-18 DIAGNOSIS — O99.810 ABNORMAL GLUCOSE AFFECTING PREGNANCY: ICD-10-CM

## 2020-06-18 PROCEDURE — 99213 OFFICE O/P EST LOW 20 MIN: CPT | Performed by: NURSE PRACTITIONER

## 2020-06-18 RX ORDER — PNV,CALCIUM 72/IRON/FOLIC ACID 27 MG-1 MG
TABLET ORAL
COMMUNITY
Start: 2020-06-04

## 2020-06-30 ENCOUNTER — TELEPHONE (OUTPATIENT)
Dept: PERINATAL CARE | Facility: CLINIC | Age: 25
End: 2020-06-30

## 2020-07-01 ENCOUNTER — TELEPHONE (OUTPATIENT)
Dept: OBGYN CLINIC | Facility: CLINIC | Age: 25
End: 2020-07-01

## 2020-07-01 ENCOUNTER — ULTRASOUND (OUTPATIENT)
Dept: PERINATAL CARE | Facility: OTHER | Age: 25
End: 2020-07-01
Payer: COMMERCIAL

## 2020-07-01 VITALS
SYSTOLIC BLOOD PRESSURE: 128 MMHG | TEMPERATURE: 98.7 F | DIASTOLIC BLOOD PRESSURE: 73 MMHG | HEART RATE: 88 BPM | BODY MASS INDEX: 29.63 KG/M2 | HEIGHT: 70 IN | WEIGHT: 207 LBS

## 2020-07-01 DIAGNOSIS — O35.0XX0 VENTRICULOMEGALY OF BRAIN ON FETAL ULTRASOUND: ICD-10-CM

## 2020-07-01 DIAGNOSIS — IMO0002 EVALUATE ANATOMY NOT SEEN ON PRIOR SONOGRAM: ICD-10-CM

## 2020-07-01 DIAGNOSIS — Z36.89 ENCOUNTER FOR ULTRASOUND TO CHECK FETAL GROWTH: Primary | ICD-10-CM

## 2020-07-01 DIAGNOSIS — O09.899 SHORT INTERVAL BETWEEN PREGNANCIES AFFECTING PREGNANCY, ANTEPARTUM: ICD-10-CM

## 2020-07-01 LAB
EXTERNAL HEMATOCRIT: 31.5 %
EXTERNAL HEMOGLOBIN: 10.3 G/DL
EXTERNAL PLATELET COUNT: 278 K/ΜL
EXTERNAL SYPHILIS RPR SCREEN: NORMAL

## 2020-07-01 PROCEDURE — 99212 OFFICE O/P EST SF 10 MIN: CPT | Performed by: OBSTETRICS & GYNECOLOGY

## 2020-07-01 PROCEDURE — 76816 OB US FOLLOW-UP PER FETUS: CPT | Performed by: OBSTETRICS & GYNECOLOGY

## 2020-07-01 NOTE — PATIENT INSTRUCTIONS
Thank you for choosing us for your  care today  If you have any questions about your ultrasound or care, please do not hesitate to contact us or your primary obstetrician  Some general instructions for your pregnancy are:     Exercise: Aim for 22 minutes per day (150 minutes per week!) of regular exercise  This is obviously hard to do during a pandemic but walking is great   Nutrition: aim for calcium-rich and iron-rich foods as well as healthy sources of protein  This will help you gain a healthy amount of weight   Protect against the flu: get yourself and your entire household vaccinated against influenza   Protect against coronavirus: practice social distancing, wear a mask in public, and wash your hands often  This virus can be spread easily by people without symptoms  Notify your primary care doctor if you have any symptoms including cough, shortness of breath or difficulty breathing, fever, chills, muscle pain, sore throat, or new loss of taste or smell   Learn about Preeclampsia: preeclampsia is a common, serious complication in pregnancy  A blood pressure of 140mmHg (top number or systolic) OR 15VAAH (bottom number or diastolic) is not normal and needs evaluation by your doctor   If you smoke, try to reduce how many cigarettes you smoke or quit completely  Do not vape   Other warning signs to watch out for in pregnancy or postpartum: chest pain, obstructed breathing or shortness of breath, seizures, thoughts of hurting yourself or your baby, bleeding, a painful or swollen leg, fever, or headache ([facility] POST-BIRTH Warning Signs campaign)  If these happen call 911  Itching is also not normal in pregnancy and if you experience this, especially over your hands and feet, potentially worse at night, notify your doctors

## 2020-07-01 NOTE — PROGRESS NOTES
Giovanni Prado: Ms Natalya Reese was seen today at [address] [facility] for fetal growth and followup missed anatomy ultrasound  See ultrasound report under "OB Procedures" tab  Please don't hesitate to contact our office with any concerns or questions    Peg Clifford MD

## 2020-07-02 ENCOUNTER — ROUTINE PRENATAL (OUTPATIENT)
Dept: OBGYN CLINIC | Facility: CLINIC | Age: 25
End: 2020-07-02

## 2020-07-02 VITALS
SYSTOLIC BLOOD PRESSURE: 125 MMHG | DIASTOLIC BLOOD PRESSURE: 74 MMHG | HEIGHT: 70 IN | BODY MASS INDEX: 29.52 KG/M2 | WEIGHT: 206.2 LBS | TEMPERATURE: 98.2 F | HEART RATE: 85 BPM

## 2020-07-02 DIAGNOSIS — Z3A.32 32 WEEKS GESTATION OF PREGNANCY: ICD-10-CM

## 2020-07-02 DIAGNOSIS — Z34.93 PRENATAL CARE IN THIRD TRIMESTER: Primary | ICD-10-CM

## 2020-07-02 PROBLEM — O99.019 ANEMIA IN PREGNANCY: Status: ACTIVE | Noted: 2020-07-02

## 2020-07-02 PROCEDURE — 99215 OFFICE O/P EST HI 40 MIN: CPT | Performed by: NURSE PRACTITIONER

## 2020-07-02 NOTE — PROGRESS NOTES
Assessment & Plan  26 y o  [id number] at 32w4d presenting for routine prenatal visit  Pt states she completed 3 hour GTT through [employer] [facility] lab and she called [facility] to request results be sent to this office  Verbal 3 hour GTT = FBS  79, 1 hour 140, 2 hour 125, 3 hour 91,written report to be Faxed  Had F/U U/S in [facility] yesterday, WNL growth and anatomy visualized has F/U appointment in 3 weeks  WNL respiratory effort  Negative cough justo or SOB    Problem List Items Addressed This Visit     None      Visit Diagnoses     Prenatal care in third trimester    -  Primary    32 weeks gestation of pregnancy            ____________________________________________________________  Subjective  She is without complaint  She denies contractions, loss of fluid, or vaginal bleeding  She feels regular fetal movements      Objective  /74 (BP Location: Left arm, Patient Position: Sitting, Cuff Size: Adult)   Pulse 85   Temp 98 2 °F (36 8 °C) (Tympanic)   Ht 5' 10" (1 778 m)   Wt 93 5 kg (206 lb 3 2 oz)   LMP 10/29/2019 (Approximate)   BMI 29 59 kg/m²     Fetal Heart Rate: 140    Patient's Active Problem List  Patient Active Problem List   Diagnosis    Fetal ultrasound    Genetic screening    Contraception management    Short interval pregnancy    Abnormal glucola     Plan  Continue PNC F/U  To call with vaginal bleeding, loss of fluid, regular contractions, decreased fetal movement, other needs or concerns  Return in 2 weeks  Pt verbalized understanding of all discussed

## 2020-07-02 NOTE — PATIENT INSTRUCTIONS
The Third Trimester  (28-42 weeks)  YOUR BABY   * your baby sucks its thumb now! * your baby can hear voices and respond to touch   so talk to him or her!!   * your babys brain grows and develops most in the last 2 months of pregnancy   * babys head and bones are soft and flexible so they can fit through the birth canal   * babys movements change towards the end of pregnancy because there is less room for kicking and stretching in your belly   * babys lungs are not fully developed and completely ready to breathe on their own until the last 3-4 weeks before your due date    YOUR BODY   * your belly is growing a lot now   * it may become more difficult to sleep well at night or to be as active as you usually are   * you may sweat more than usual   * you will become more off-balancebe careful not to fall!!   * you may develop hemorrhoids (which can be painful and make it difficult to sit down)   * the last two months of pregnancy can become very uncomfortablewith backaches, headaches, and heartburn   * you can start to have contractions  as long as they are irregular and less than 5 per hour, this is a normal part of your body getting ready to have a baby   * your cervix may start to thin out and open upto get ready for delivery   * you may find yourself needing to pee very often  because baby is pressing on your bladder so much   * you may get out of breathe more quickly than usual      FETAL KICK COUNTS    In the third trimester (after 28 weeks gestation) you should be performing fetal kick counts every day  Your baby should move at least 10 times in 2 hours during an active time, once a day  Choose atime of day when your baby is most active  Try to do this around the same time each day  Get into a comfortable position and then write down the time your baby first moves  Count each movement until the baby moves 10 times  These movements include kicks, punches, nudges, flutters, or rolls    This can take anywhere from 5 minutes to 2 hours  Write down the time you feel the baby's 10th movement  If 2 hours has passed and your baby has not moved at least 10 times, you should CALL THE OFFICE RIGHT AWAY  [phone]  PREMATURE LABOR     When to call [phone]:  * I need to call immediately if I have even a small amount of LIQUID leaking from my vagina, with or without contractions  * I need to call if I am BLEEDING from my vagina  * I need to call if I am feeling CRAMPING that continues after drinking 2-3 glasses of water and lying down on my side for one hour and that feels like I am having a period  * I need to call if I feel CONTRACTIONS  more than 4 times in an hour that feels like the baby is balling up even after I try drinking 2-3 glasses of water and lying down on my side for an hour  * I need to call if I notice a change in my vaginal DISCHARGE  * I need to call if I am feeling PELVIC PRESSURE  that feels like the baby is pushing down into my vagina and lasts more than an hour  * I need to call if I have LOW BACKACHE which is new and near my tailbone  It may either come and go several times during an hour or stay there constantly  PRE-ECLAMPSIA     What is it? Pre-eclampsia is a serious disease that can occur during pregnancy related to high blood pressures  It can happen to any woman  Why should I care? Women who develop pre-eclampsia have serious risks which can include seizures, stroke, organ damage, premature birth of their baby  In the very worst cases, it can cause death of the mother and/or their baby  What should I pay attention to?    Signs and symptoms of pre-eclampsia can include:   * Severe swelling of face or hands    * A headache that will not go away even after you have taken Tylenol   * Seeing spots or changes in eyesight    * Pain in the upper abdomen or shoulder    * New nausea and vomiting (in the second half of pregnancy)    * Sudden weight gain    * Difficulty breathing     What should I do? If you experience any of the above symptoms of pre-eclampsia, contact your OB provider  Finding pre-eclampsia early is important for you and your baby  Call us at [phone]  [facility] BREASTFEEDING     BENEFITS FOR BABIES   * stronger immune systems (less allergies, eczema, asthma, and childhood cancers)   * less diarrhea and constipation or other GI diseases   * fewer colds and ear infections   * better vision and teeth (fewer cavities)   * improves IQ   * lower rates of diabetes and obesity in childhood     BENEFITS FOR MOMS   * promotes faster weight loss after delivery   * lower risk for postpartum depression   * lower risk for breast, uterine, and ovarian cancers   * lower risk for osteoporosis developing with age   * easier than formula - is always right with you, clean, and the right temperature   * less expensive than formulaits FREE !!!!     KEYS TO SUCCESSFUL BREASTFEEDING   * keep baby skin-to-skin until after first feeding event   * keep baby in your room with you during your hospital stay after delivery   * avoid any bottle feedings (unless medically necessary)   * limit the use of pacifiers and swaddling   * ask for help if you are having any issueslactation consultants (who specialize in breastfeeding) are available to help you   * a healthy diet for momeating a variety of foods and portions in moderation    THINGS YOU SHOULD KNOW ABOUT BREASTFEEDING   * most medications are considered compatible with breastfeeding by the [address] [place] [address] Academy of Pediatrics, but you should check with your health care provider or lactation consultant prior to taking a new medicationjust to be sure it is safe   * alcohol (beer, wine, liquor) can be passed from mother to baby through breast milkan occasional, social drink is deemed acceptable by the American Academy of [place] [address]   more than that should be avoided   * breastfeeding is NOT an effective method of birth control   * nicotine (in cigarettes) can pass from mother to baby through breast milk   however, for mothers who smoke, it is still healthier to breastfeed than use formula   * caffeine should be limited to 1-3 cups per dayincludes coffee, soda, energy drinks         PERINEAL / VAGINAL MASSAGE    What can I do now to decrease my chances of tearing during delivery? Massaging around the vaginal opening by you (or your partner), either antepartum (before birth) or during the second stage of labor, can reduce the likelihood of perineal tearing during childbirth  Likewise, the use of warm packs held on the perineum during the pushing stage of labor can reduce the severity of your tear  This will happen during the pushing stage of labor  At home, you can also help reduce the chances of injury that may occur during the birth of your child through perineal massage  When should I do this? Starting around or shortly after 34 weeks of pregnancy, you or your partner should provide 5-10 minutes of vaginal massage 1-4 times per week  How? Use either almond, coconut, or olive oil and water mixture on 1 or 2 fingers (depending on comfort)  Insert finger(s) 3-5cm into the vagina  Apply sweeping downward/sideward pressure from 3 to 9 o'clock for 5-10 minutes, 1-4 times per week  WARNING SIGNS DURING PREGNANCY  Call our office at [phone] if you experience any of the followin  Vaginal bleeding  2  Sharp abdominal pain that does not go away  3  Fever (more than 100 4 and is not relieved by Tylenol)  4  Persistent vomiting lasting greater than 24 hours  5  Chest pain   6  Pain or burning when you urinate  7  Severe headache that doesn't resolve with Tylenol  8  Blurred vision or seeing spots in your vision  9  Sudden swelling of your face or hands  10  Redness, swelling or pain in a leg  11  A sudden weight gain in just a few days  12   Decrease in your baby's movement (after 28 weeks or the 6th month of pregnancy)  13  A loss of watery fluid from your vagina - can be a gush, a trickle or continuous wetness  14  After 20 weeks of pregnancy, rhythmic cramping (greater than 4 per hour) or menstrual like low/pelvic pain          VACCINES IN PREGNANCY    TDAP  Whopping cough (or pertusSsis) can be serious for anyone, but for your , it can be life-threatning  Up to 20 babies die each year in the [place]  Due to whopping cough  About half of babies younger than 3year old who get whopping cough need treatment in the hospital   The younger the baby is when he or she gets whopping cough, the more likely he or she will need to be treated in a hospital   When you receive the whopping cough vaccine (Tdap) during your pregnancy, your body will create protective antibodies and pass some of them to your baby before birth  These antibodies can help protect your baby from getting whopping cough until they are old enough to be vaccinated themselves (usually around 7 months of age)  INFLUENZA  Changes in your immune, heart, and lung functions during pregnancy make you more likely to get seriously ill from the flu  Catching the flu also increases your chances for serious problems for your developing baby, including premature labor and delivery  It is recommended that all women who are pregnant during flu season should receive an influenza vaccine  Coronavirus (ZQTXW-93) pregnancy FAQs: Medical experts answer your questions  From [employer] cy  com/0_coronavirus-covid-19-pregnancy-faq-medical-rhtgbad-ooxbud-po_39087125  bc (courtesy of [facility])  As of 3/18/20  By Georgia Kim 40  Medically reviewed by Society for Maternal-Fetal Medicine       We asked parents-to-be to send us their most pressing questions about coronavirus (COVID-19)  Among them: Is it still safe to deliver in a hospital? What if my ob-gyn has the virus?  We sent those questions to the top docs at the Society for Maternal-Fetal Medicine  Here are their answers  The coronavirus (COVID-19) pandemic has been declared a national emergency in the [facility] by [employer]  Moms-to-be like you are concerned about everything from getting medicines to managing disruptions at work  But above and beyond any worry about lifestyle changes is a focus on your health and the impact of COVID-19 on your pregnancy  We asked obstetrics doctors who handle the most complicated pregnancy issues to answer your questions about the coronavirus  Here are their responses, provided by Dr Cleo Stevenson and her colleagues at the Society for [place] [address]  Am I at more risk for COVID-19 if I'm pregnant? We don't know  Pregnancy does change your immune system in ways that might make you more susceptible to viral respiratory infections like COVID-19  If you become infected, you might also be at higher risk for more severe illness compared to the general population  Although this does not appear to be the case with COVID-19, based on limited data so far, a higher risk has been true for pregnant women with other coronavirus infections (SARS-CoV and MERS-CoV) and other viral respiratory infections, such as flu  It's important to take preventive actions to avoid infection, such as washing your hands often and avoiding people who are sick  How might coronavirus affect my pregnancy? Again, we don't know  Women with other coronavirus infections (such as SARS-CoV) did not have miscarriage or stillbirth at higher rates than the general population  We know that having other respiratory viral infections during pregnancy, such as flu, has been associated with problems like low birth weight and  birth  Also, having a high fever early in pregnancy may increase the risk of certain birth defects  Could I transmit coronavirus to my baby during pregnancy or delivery?   We don't know whether you could transmit COVID-19 to your baby before or during delivery  However, among the few case studies of infants born to mothers with COVID-23 published in peer-reviewed literature, none of the infants tested positive for the virus  Also, there was no virus detected in samples of amniotic fluid or breast milk  There have been a few reports of newborns as young as a few days old with infection, suggesting that a mother can transmit the infection to her infant through close contact after delivery  There have been no reports of mother-to-baby transmission for other coronaviruses (MERS-CoV and SARS-CoV), although only limited information is available  Is it safe for me to deliver at a hospital where there have been COVID-19 cases? Yes  We know that COVID-19 is a very scary virus  The good news is that hospitals are taking great precautions to keep patients and healthcare providers safe  When a patient is even suspected to have COVID-19, they are placed in a negative pressure room  (Think of these rooms as vacuums that suck and filter the air so it's safe for the other people in the hospital)  This makes it possible for you to deliver at the hospital without putting you or your baby at risk  Hospitals are also implementing stricter visiting policies to keep patients safe  It's worth calling your hospital to check if there are any new regulations to be aware of  What plans should I make now in case the hospital system is overwhelmed when it's time for me to deliver? This is a great question to talk with your doctor or midwife about when you're 34 to 36 weeks pregnant  Every hospital is making different plans for dealing with this scenario  I work in healthcare  Should I ask my doctor to excuse me from work until the baby is born? What if I work in a school, the travel [address], or some other high-risk setting?   Healthcare facilities should take care to limit the exposure of pregnant employees to patients with confirmed or suspected COVID-19, just as they would with other infectious cases  If you continue working, be sure to follow the CDC's risk assessment and infection control guidelines  If you work in a school, travel [place], or other high-risk setting, talk with your employer about what it's doing to protect employees and minimize infection risks  Wash your hands often  What if my OB gets COVID-19? If your doctor or midwife tests positive for COVID-19, they will need to be quarantined until they recover and are no longer at risk of transmitting the virus  In this case, you'll be assigned to another OB in your doctor's practice (or you may choose another practitioner yourself)  Ask your new OB or your doctor's office if you should self-quarantine or be tested for the virus  (It will depend on when you last saw your provider and when that person tested positive )    Should we hold off on trying to conceive because of COVID-19? At this time, there's no reason to hold off on trying to get pregnant, but the data we have is really limited  For example, we don't think the virus causes birth defects or increases your risk of miscarriage  But we don't know whether you could transmit COVID-19 to your baby before or during delivery  We also don't know if the virus lives in semen or can be sexually transmitted  We have a babymoon scheduled in the next few months - should we cancel? If you're planning to travel internationally or on a cruise, you should strongly consider canceling  At this time, the virus has reached more than 140 countries, and there are travel bans to [place], most of [place], and [place]  Places where large numbers of people gather are at highest risk, especially airports and cruise ships  If you're planning travel in the [place] , note that any travel setting increases your risk of exposure, and there may be travel bans even in [place] by the time you're scheduled to go   Even if you're state is not blocked, you'll definitely want to avoid traveling to communities where the virus is spreading  To find out where these places are, check The [place] Times map based on CDC data  For the most current advice to help you avoid exposure, check the CDC's COVID-19 travel page  Will the hospital separate me from my  and keep the baby in quarantine? If you test positive for COVID-19 or have been exposed but have no symptoms, the CDC, [address] [place] [address] of Obstetricians and Gynecologists, and the Society for [place] 250 all recommend that you be  from your baby to decrease the risk of transmission to the baby  This would last until you are no longer at risk of transmitting the virus  If you do not have COVID-19 and have not been exposed to the virus, the hospital will not separate you from your   My hospital is restricting visitors and only allowing one support person  If my support person leaves after the delivery, will they be allowed to come back? Every hospital has different policies  Contact your hospital or labor and delivery unit a week or so before delivery to get the most up-to-date restrictions  In general, if your support person needs to leave, they would be allowed back unless they knew they were exposed to COVID-19 after leaving your company  [facility] understands that the coronavirus (COVID-19) pandemic is an evolving story and that your questions will change over time  We'll continue asking moms and dads in our Community what they want to know, and we'll get the answers from experts to keep them - and you - informed and supported  My mom was planning to fly here to help me care for my new baby after delivery  Should I tell her not to come? Yes  If your mom is over 61 or has any serious chronic medical conditions (such as heart disease, lung disease, or diabetes), she is at higher risk of serious illness from COVID-19 and should avoid air travel   And remember that any travel setting increases a person's risk of exposure  So, it may be risky to have her around the baby after she has been traveling  For the most current advice on traveling, check the CDC's COVID-19 travel page  [facility] understands that the coronavirus pandemic is an evolving story and that your questions will change over time  We'll continue asking moms and dads in our Community what they want to know, and we'll get the answers from experts to keep them - and you - informed and supported      Continue  [place] follow up as needed  Return in 1 week

## 2020-07-15 ENCOUNTER — TELEPHONE (OUTPATIENT)
Dept: OBGYN CLINIC | Facility: CLINIC | Age: 25
End: 2020-07-15

## 2020-07-16 ENCOUNTER — ROUTINE PRENATAL (OUTPATIENT)
Dept: OBGYN CLINIC | Facility: CLINIC | Age: 25
End: 2020-07-16

## 2020-07-16 VITALS
HEIGHT: 70 IN | TEMPERATURE: 98.4 F | BODY MASS INDEX: 30.38 KG/M2 | DIASTOLIC BLOOD PRESSURE: 60 MMHG | HEART RATE: 102 BPM | WEIGHT: 212.2 LBS | SYSTOLIC BLOOD PRESSURE: 120 MMHG

## 2020-07-16 DIAGNOSIS — O99.810 ABNORMAL GLUCOSE AFFECTING PREGNANCY: Primary | ICD-10-CM

## 2020-07-16 DIAGNOSIS — Z3A.34 34 WEEKS GESTATION OF PREGNANCY: ICD-10-CM

## 2020-07-16 DIAGNOSIS — O09.899 SHORT INTERVAL BETWEEN PREGNANCIES AFFECTING PREGNANCY, ANTEPARTUM: ICD-10-CM

## 2020-07-16 PROCEDURE — 99213 OFFICE O/P EST LOW 20 MIN: CPT | Performed by: OBSTETRICS & GYNECOLOGY

## 2020-07-16 NOTE — ASSESSMENT & PLAN NOTE
- She is doing well  - Recommend regular use of prenatal vitamins  - Labor precautions discussed  - Contraception: Permanent sterilization, MA31 signed at earlier visit  - Recommend good hydration and regular diet

## 2020-07-16 NOTE — ASSESSMENT & PLAN NOTE
- Patient 3h OGTT results verbally; fasting 79, 1 hour 140, 2 hour 125, 3 hour 91   - Se had the test in a private lab and sent the result by fax but we did not get yet     - Will send again to our fax

## 2020-07-22 ENCOUNTER — TELEPHONE (OUTPATIENT)
Dept: PERINATAL CARE | Facility: CLINIC | Age: 25
End: 2020-07-22

## 2020-07-22 NOTE — TELEPHONE ENCOUNTER
Unable to leave message, person that answered the phone hung up or call was lost     Attempted to reach patient by phone and left voicemail to confirm appointment for MFM ultrasound  1 support person ( must be over the age of 15) may accompany you for your appointment  If you or your support person have traveled outside the state in the past 2 weeks, please call and notify our office today #[phone]  You and your support person must wear a mask ,covering nose and mouth,during your entire visit  You and your support person will have temperature screened upon arrival     Please call our office prior to entering the building  Check in and rooming questions will be done via phone  We will give you directions when to enter for your appointment  Inside office # provided:  [place] line: [phone]  [place] line:  [phone]  [facility] line:  [phone] line:  [phone]  [place] line:  [phone]  [place] line:  [phone]    [facility] does not allow cell phone use, recording device or streaming during ultrasound  IF you are not feeling well- cough, fever, shortness of breath or any flu like symptoms, contact your primary care physician or [phone] Lelia Hoff    Any questions with these instructions please call Maternal Fetal Medicine nurse line today @ # [phone]

## 2020-07-23 ENCOUNTER — ULTRASOUND (OUTPATIENT)
Dept: PERINATAL CARE | Facility: OTHER | Age: 25
End: 2020-07-23
Payer: COMMERCIAL

## 2020-07-23 VITALS
BODY MASS INDEX: 30.78 KG/M2 | TEMPERATURE: 98.2 F | WEIGHT: 215 LBS | HEIGHT: 70 IN | SYSTOLIC BLOOD PRESSURE: 111 MMHG | HEART RATE: 80 BPM | DIASTOLIC BLOOD PRESSURE: 73 MMHG

## 2020-07-23 DIAGNOSIS — O35.0XX0: ICD-10-CM

## 2020-07-23 DIAGNOSIS — Z3A.35 35 WEEKS GESTATION OF PREGNANCY: ICD-10-CM

## 2020-07-23 DIAGNOSIS — O09.899 SHORT INTERVAL BETWEEN PREGNANCIES AFFECTING PREGNANCY, ANTEPARTUM: Primary | ICD-10-CM

## 2020-07-23 PROCEDURE — 76816 OB US FOLLOW-UP PER FETUS: CPT | Performed by: OBSTETRICS & GYNECOLOGY

## 2020-07-23 PROCEDURE — 99212 OFFICE O/P EST SF 10 MIN: CPT | Performed by: OBSTETRICS & GYNECOLOGY

## 2020-07-23 NOTE — LETTER
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

## 2020-07-23 NOTE — LETTER
July 23, 2020     Duong Galindo MD  [address]    Patient: Kary Byers   YOB: 1995   Date of Visit: 7/23/2020     Dear Dr Amie Lambert      Thank you for referring William Amel to me for evaluation  Below are the relevant portions of my assessment and plan of care  If you have questions, please do not hesitate to call me  I look forward to following Lex Persaud along with you           Sincerely,        Shukri Varma MD        CC: No Recipients    Progress Notes:

## 2020-07-23 NOTE — PROGRESS NOTES
The patient was seen today for an ultrasound  Please see ultrasound report (located under Ob Procedures) for additional details  Thank you very much for allowing us to participate in the care of this very nice patient  Should you have any questions, please do not hesitate to contact me  Shubham Chowdhury MD [address]  Attending Physician, Emily

## 2020-07-27 ENCOUNTER — TELEPHONE (OUTPATIENT)
Dept: PERINATAL CARE | Facility: CLINIC | Age: 25
End: 2020-07-27

## 2020-07-27 ENCOUNTER — TELEPHONE (OUTPATIENT)
Dept: NEUROLOGY | Facility: CLINIC | Age: 25
End: 2020-07-27

## 2020-07-27 NOTE — TELEPHONE ENCOUNTER
Pt called to schedule a CONSULT  Ref was for Pediatric Neuro AND came from a specialist     Abeba Cotter pt that her insurance REQUIRES that the REF come from her PCP (ONLY) and due to our regulations, this must be a [facility] Provider - not out of network

## 2020-07-27 NOTE — TELEPHONE ENCOUNTER
PT called [phone] a pediatric neurology to make an appt  When pt called she was told that the order was place wrong and that she has to call her pcp  What can patient do? Or who can she call to make appt?

## 2020-07-27 NOTE — TELEPHONE ENCOUNTER
Received a call from Dr Marley Quinn to clarify on ref he had sent for Pediatric Neurology  Dr Marley Quinn confirmed this is indeed a ref for pediatric Neurology as this pertains to the fetus, DX Code O35  0XX0 Fetal neurologic malformation  Per Dr Marley Quinn pt to needs see peds neuro prior to delivery, to know what to expect after delivery of fetus with neurological malformation  Informed Dr Marley Quinn I would follow up with practice admin, Scott Neighbours, on how to direct referral to ensure that patient is contacted quickly and by correct department

## 2020-07-29 ENCOUNTER — HOSPITAL ENCOUNTER (OUTPATIENT)
Facility: HOSPITAL | Age: 25
Discharge: HOME | End: 2020-07-29
Attending: OBSTETRICS & GYNECOLOGY | Admitting: OBSTETRICS & GYNECOLOGY
Payer: COMMERCIAL

## 2020-07-29 VITALS
OXYGEN SATURATION: 97 % | SYSTOLIC BLOOD PRESSURE: 117 MMHG | DIASTOLIC BLOOD PRESSURE: 71 MMHG | TEMPERATURE: 98.5 F | HEART RATE: 93 BPM | RESPIRATION RATE: 18 BRPM

## 2020-07-29 PROBLEM — Z3A.36 36 WEEKS GESTATION OF PREGNANCY: Status: ACTIVE | Noted: 2020-07-16

## 2020-07-29 PROCEDURE — 76815 OB US LIMITED FETUS(S): CPT | Performed by: OBSTETRICS & GYNECOLOGY

## 2020-07-29 PROCEDURE — 99203 OFFICE O/P NEW LOW 30 MIN: CPT

## 2020-07-29 PROCEDURE — NC001 PR NO CHARGE: Performed by: OBSTETRICS & GYNECOLOGY

## 2020-07-29 NOTE — PROGRESS NOTES
L&D Triage Note - OB/GYN  Toan Caldwell 26 y o  female MRN: [id number]  Unit/Bed#: L&D 329-02 Encounter: [id number]      Assessment:  26 y o  [id number] at 36w3d who presents for evaluation of contractions, vaginal spotting, and some leakage of fluid  Rupture workup was negative, with no pooling, no ferning, nitrazine positive, KAYY 15 4, LVP 6 2cm  Her cervical exam was 5/60/-3, posterior, unchanged 2 hours later  Plan:  1  Will plan to recheck cervix in 2 hours  2  GBS collected today  3  Discharged home with precautions      ______________________________________________________________________      Chief Complaint: Contractions    Subjective:  26 y o  [id number] at 36w3d who presents for evaluation of contractions, with some vaginal spotting today  She reports that she has been having some contractions overnight, and noted some vaginal spotting today when she was wiping  She also reports that she has been noticing more leakage of vaginal discharge when she is walking for the past 3 weeks  She denies decreased fetal movement  She is a patient of [facility]    This pregnancy is complicated by abnormal 1h GTT, short interval pregnancy, desire for permanent sterilization, obesity, and 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

## 2020-07-29 NOTE — LETTER
[facility] AND DELIVERY  [address] [facility] [address]  Dept: [phone]    July 29, 2020     Patient: Sigrid Tam   YOB: 1995   Date of Visit: 7/29/2020       To Whom it May Concern: Aiden Fry is under my professional care  She was seen in the hospital from 7/29/2020   to 07/29/20  She may return to work on 7/30/20 without limitations  Her partner Mami Vallejo and Sasha Reinoso accompanied her, and will be able to return to work tomorrow  If you have any questions or concerns, please don't hesitate to call           Sincerely,          Doni Gongora MD

## 2020-07-29 NOTE — PROCEDURES
Jade Gitelman, urszula [id number] at 36w3d with an VANESA of 8/23/2020, by Ultrasound, was seen at [facility] for the following procedure(s): $Procedure Type: KAYY]         4 Quadrant KAYY  KAYY Q1 (cm): 6 2 cm  KAYY Q2 (cm): 2 3 cm  KAYY Q3 (cm): 4 4 cm  KAYY Q4 (cm): 2 5 cm  KAYY TOTAL (cm): 15 4 cm  LVP (cm): 6 2 cm

## 2020-08-03 ENCOUNTER — TELEPHONE (OUTPATIENT)
Dept: OBGYN CLINIC | Facility: CLINIC | Age: 25
End: 2020-08-03

## 2020-08-04 ENCOUNTER — ROUTINE PRENATAL (OUTPATIENT)
Dept: OBGYN CLINIC | Facility: CLINIC | Age: 25
End: 2020-08-04

## 2020-08-04 VITALS
SYSTOLIC BLOOD PRESSURE: 111 MMHG | DIASTOLIC BLOOD PRESSURE: 71 MMHG | HEART RATE: 99 BPM | BODY MASS INDEX: 30.73 KG/M2 | TEMPERATURE: 98 F | WEIGHT: 214.2 LBS

## 2020-08-04 DIAGNOSIS — Z3A.36 36 WEEKS GESTATION OF PREGNANCY: Primary | ICD-10-CM

## 2020-08-04 DIAGNOSIS — O99.013 ANEMIA DURING PREGNANCY IN THIRD TRIMESTER: ICD-10-CM

## 2020-08-04 DIAGNOSIS — O35.0XX0: ICD-10-CM

## 2020-08-04 PROCEDURE — 87653 STREP B DNA AMP PROBE: CPT | Performed by: NURSE PRACTITIONER

## 2020-08-04 PROCEDURE — 87491 CHLMYD TRACH DNA AMP PROBE: CPT | Performed by: NURSE PRACTITIONER

## 2020-08-04 PROCEDURE — 87591 N.GONORRHOEAE DNA AMP PROB: CPT | Performed by: NURSE PRACTITIONER

## 2020-08-04 PROCEDURE — 99213 OFFICE O/P EST LOW 20 MIN: CPT | Performed by: NURSE PRACTITIONER

## 2020-08-04 RX ORDER — FERROUS SULFATE TAB EC 324 MG (65 MG FE EQUIVALENT) 324 (65 FE) MG
324 TABLET DELAYED RESPONSE ORAL
Qty: 30 TABLET | Refills: 3 | Status: SHIPPED | OUTPATIENT
Start: 2020-08-04 | End: 2020-08-10 | Stop reason: HOSPADM

## 2020-08-04 NOTE — PROGRESS NOTES
Toan Caldwell presents today for routine OB visit at 37w2d  Blood Pressure: 111/71  Wt=97 2 kg (214 lb 3 2 oz); Body mass index is 30 73 kg/m² ; TWG=15 1 kg (33 lb 3 2 oz)  Fetal Heart Rate: 140; Fundal Height (cm): 40 cm  SVE today: 4-5cm/70%, -3  Abdomen: gravid, soft, non-tender  She reports irregular uterine contractions  Denies vaginal bleeding or leaking of fluid  Reports adequate fetal movement of at least 10 movements in 2 hours once daily  Vaginal cultures: GBS/GC/CT collected today  Given Rx ferrous sulfate  Reviewed labor precautions and fetal kick counts as well as pre-eclampsia warning signs  Reviewed perineal massage for decreasing risk of perineal lacerations during delivery  Advised to continue medications and return in 1 week        Current Outpatient Medications:     ferrous sulfate 324 (65 Fe) mg, Take 1 tablet (324 mg total) by mouth daily before breakfast, Disp: 30 tablet, Rfl: 3    Prenatal Vit-Fe Fumarate-FA (PREPLUS) 27-1 MG TABS, TK 1 T PO QD, Disp: , Rfl:       G4 Problems (from 05/16/20 to present)     Problem Noted Resolved    Fetal neurologic abnormality 7/23/2020 by Nilay Jaimes MD No    Overview Addendum 8/4/2020  9:42 AM by AMA Pendleton     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  Needs peds neuro consultation - scheduled for 9/16/20         Anemia in pregnancy 7/2/2020 by AMA Pendleton No    Overview Signed 7/2/2020  2:51 PM by AMA Pendleton     Needs Fe supp         Fetal ultrasound 5/21/2020 by AMA Pendleton No    Overview Addendum 8/4/2020 10:06 AM by AMA Pendleton     5/19/20 (26w2d) EDC confirmed,  no previa, growth=44%, tara WNL w/missed views  7/1/20 (32w3d) growth=44%, enlarged cisterna magna, other tara WNL & complete  7/23/20 (35w4d) growth=39%, ambrosio cisterna magna (15mm) and inferior vermian hypoplasia         Genetic screening 5/21/2020 by AMA Pendleton No    Overview Signed 5/21/2020 3:21 PM by AMA Escobar     Too late at presentation for screening         Contraception management 2020 by AMA Escobar No    Overview Addendum 2020  8:59 AM by AMA Escobar     Need to discuss options - done 20  Reviewed with patient surgical sterilization is PERMANENT STERILIZATION and NOT REVERSIBLE  Discussed other temporary contraceptive options (including LARC's)  Reviewed risks & benefits of surgical sterilization  Patient verbally rejects other options and desires surgical sterilization  Sterilization consent (MA 31 form) signed 2020           Short interval pregnancy 2020 by AMA Escobar No    Overview Addendum 2020 10:08 AM by AMA Escobar      2019  Serial growth scans - done         Abnormal glucola 2020 by AMA Escobar No    Overview Addendum 2020 11:15 AM by AMA Escobar     Needs 3h GTT - done WNL

## 2020-08-04 NOTE — PATIENT INSTRUCTIONS
SCARLET DE PARTO   Llame a Meagan Cone al [phone] para cualquiera de los siguientes:    * Necesito llamar inmediatamente yo si tengo incluso courtney pequeña cantidad de LIQUIDO se escapa de mi vagina, con o sin contracciones  * Delaney llamar si tengo SANGRADO courtney cantidad igual o más que un período  Blanca Shaun cantidad de flujo vaginal sangriento es normal al final del embarazo  * Delaney llamar si tengo CONTRACCIONES cada melba minutos shannan al [employer]  Necesito un reloj para tiempo  Yo delaney tiempo desde el comienzo de courtney contracción hasta el comienzo de la siguiente  * De ser necesario ANTES DE  ir al hospital    * Necesito tener un plan para TRANSPORTE a ir al hospital cuando estoy en [place]  Trabajo generalmente no es courtney emergencia que requiere courtney ambulancia  CUENTAS DEL RETROCESO FETAL    En el tercer trimestre (después de 28 semanas de gestación) deben realizar patada fetal cuentas cada día  [place] bebé debe moverse al menos 10 veces en 2 horas shannan un tieo [place], courtney vez al día  Elegir el atime del día cuando el bebé es [place]  Trate de hacerlo a la misma hora cada día  Entrar en courtney posición cómoda y luego escribir el tiempo que tu bebé se mueve cindy  Cuenta cada movimiento hasta que el bebé se mueve 10 veces  Estos movimientos incluyen patadas, puñetazos, codazos, revolotea o rollos  [place] puede tardar entre 5 minutos a 2 horas  Anote el tiempo que sientes movimiento 10 del bebé  Si ha pasado 2 horas y tu bebé no se ha movido al menos 10 veces, usted debe llamar a la oficina de inmediato  [phone]          MASAJE EN LA TISHA PERINEAL O VAGINAL    Que puedo hacer ahora para reducir las probabilidades de desgarro shannan el parto? Masaje alrededor del orificio de la vagina realizado por usted misma (o por jones jennifer)    El masaje en esta tisha, ya sea antes del parto o shannan la segunda etapa del parto, [place] reducir la probabilidad de desgarro perineal shannan el parto  Asimismo, el uso de compresas tibias en el perineo shannan la etapa expulsiva del parto puede reducir la pravedad del desparro  [place] sucedera shannan la etapa expulsiva del parto  En casa tambien puede reducir las probabilidades de sufrir lesiones durnate el parto de con masajes en la paulette perineal     Keenan Reddy? Todas las mujeres embarazadas a partir de, [place], las [facility]  Cuando? Usted o jones jennifer deben hacer masajes en la paulette vaginal shannan 5 a 10 minutos de 1 a 4 veces por semana  [place]? Use courtney mezcla de agua y aceite de leeannasalazar, margarito u pineda con 1 o 2 dedos (yara la comodidad)  Inserte los dedos en la vagina entre 3 y 5cm  Aplique presion general hacia abajo y [employer] costados shannan 5 a [address] 3 y las 9 horas, de 1 a 4 veces por semana  GROUP B STREP    Group B Strep (GBS) es courtney bacteria vaginal común  Aproximadamente el 25% de las mujeres normalmente tienen la bacteria GBS en la vagina  NO es courtney infección de transmisión sexual  ¡De hecho, no es courtney infección! Es solo courtney bacteria vaginal normal para muchas mujeres  Sin embargo, la bacteria GBS puede ser peligrosas para un bebé recién nacido si el bebé está expuesto a mike bacteria particular shannan el parto y el nacimiento Y desarrolla courtney infección de la bacteria  La probabilidad de courtney infección de GBS en recién nacidos para courtney rj que tiene las bacteria GBS en la vagina es cerca de 1% - 2%  [place] si la infeccion produce, un bebé podría ser gravemente enfermo  Por esta razón, hacemos un cultivo vaginal (Q-Tip de la vagina y el recto) para todas las mujeres embarazadas en el aproximadamente 39 semanas de [place]  Si el cultivo demuestra que hay bacteria GBS presente, no es courtney razón para el pánico! [employer] en esta situación dará dianne antibióticos de la rj a través de jones IV mientras está en parto   Cuando courtney madre se trata con antibióticos shannan el [place], [place] bebé FARZANA NUNCA se infecta con la bacteria GBS

## 2020-08-05 ENCOUNTER — CONSULT (OUTPATIENT)
Dept: NEUROLOGY | Facility: CLINIC | Age: 25
End: 2020-08-05
Payer: COMMERCIAL

## 2020-08-05 VITALS — HEART RATE: 90 BPM | HEIGHT: 70 IN | WEIGHT: 214 LBS | RESPIRATION RATE: 16 BRPM | BODY MASS INDEX: 30.64 KG/M2

## 2020-08-05 DIAGNOSIS — O35.0XX0: ICD-10-CM

## 2020-08-05 DIAGNOSIS — Z3A.36 36 WEEKS GESTATION OF PREGNANCY: Primary | ICD-10-CM

## 2020-08-05 PROCEDURE — 99244 OFF/OP CNSLTJ NEW/EST MOD 40: CPT | Performed by: PSYCHIATRY & NEUROLOGY

## 2020-08-05 NOTE — ASSESSMENT & PLAN NOTE
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 noted on UA at ~ 33 weeks    Reviewed with Sal Slater (mom of fetus/baby) potential outcomes & potential prognosis  There is a large, varied spectrum  Most concerning is cisterna magna & vermian hypoplasia being seen together- as this is suggestive of possible Edgardo Sidles   This can be seen in isolation but also in genetic syndromes with varying delays - physical and intellectual   Winford Limes isolated can be and is typically a normal variant     Recommend Brain MRI on  after birth prior to d/c home   Will be able to fully evaluate CNS & help guide diagnosis & prognosis further     Will also follow baby for developmental concerns given US abnormality    Mom aware of all the above - verbalized understanding  Appointment made for post partum follow up with Baby

## 2020-08-05 NOTE — PATIENT INSTRUCTIONS
F/u post delivery with Infant    MRI to be done once born    Will review results at visits and also will periodically monitor development

## 2020-08-05 NOTE — PROGRESS NOTES
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 noted on UA at ~ 33 weeks    Reviewed with Dayo Emanuel (mom of fetus/baby) potential outcomes & potential prognosis  There is a large, varied spectrum  Most concerning is cisterna magna & vermian hypoplasia being seen together- as this is suggestive of possible Shea Wasserman  This can be seen in isolation but also in genetic syndromes with varying delays - physical and intellectual   Marino Tej isolated can be and is typically a normal variant     Recommend Brain MRI on  after birth prior to d/c home   Will be able to fully evaluate CNS & help guide diagnosis & prognosis further     Will also follow baby for developmental concerns given US abnormality    Mom aware of all the above - verbalized understanding  Appointment made for post partum follow up with Baby             Subjective: Thank you Ana Alegria DO for referring your patient for neurological consultation regarding prenatal scan abnormality    Dayo Emanuel  is a 26year old female and currently 37 weeks 3/7 days preganant, history obtained by Dayo Emanuel states she is here for a prenatal consult given an abnormal ultrasound  Of note this is not her first child, she has 2 previous children (daughters) and a spontaneous   Previous pregnancy 1 year ago with daughter who is now 3years old  Dayo Emanuel states she understands why she is here  She understands there is abnormal finding on the prenatal ultrasound in her current fetus' brain  Per chart review noted on US on  and then again   Dayo Emanuel states she found out about this at 36 weeks  Mom states she did not know of this prior  She states her anatomy US was not completed as she did not find out she was pregnant until 26 weeks, hence the delay in [address] and then finding this abnormality  Pre-melvina care has been in place since 26 weeks then            The following portions of the patient's history were reviewed and updated as appropriate: allergies, current medications, past family history, past medical history, past social history, past surgical history and problem list   No birth history on file    Past Medical History:   Diagnosis Date    No known health problems      Family History   Problem Relation Age of Onset    No Known Problems Mother     No Known Problems Father     No Known Problems Sister     No Known Problems Brother     No Known Problems Daughter     Breast cancer Neg Hx     Cancer Neg Hx     Diabetes Neg Hx     Seizures Neg Hx     Migraines Neg Hx     Neurodegenerative disease Neg Hx     Neurological problems Neg Hx      Social History     Socioeconomic History    Marital status: Single     Spouse name: Sulma Davis Number of children: 2    Years of education: 13    Highest education level: Some college, no degree   Occupational History    None   Social Needs    Financial resource strain: Not hard at all   Yazino-Sherpany insecurity     Worry: Never true     Inability: Never true    Transportation needs     Medical: No     Non-medical: No   Tobacco Use    Smoking status: Never Smoker    Smokeless tobacco: Never Used   Substance and Sexual Activity    Alcohol use: No    Drug use: Never    Sexual activity: Yes     Partners: Male   Lifestyle    Physical activity     Days per week: 0 days     Minutes per session: 0 min    Stress: Not at all   Relationships    Social connections     Talks on phone: Once a week     Gets together: Once a week     Attends Oriental orthodox service: Never     Active member of club or organization: No     Attends meetings of clubs or organizations: Never     Relationship status: Never     Intimate partner violence     Fear of current or ex partner: No     Emotionally abused: No     Physically abused: No     Forced sexual activity: No   Other Topics Concern    None   Social History Narrative    Lives with  &  2 daughters       Review of Systems   Constitutional: Negative  HENT: Negative  Eyes: Negative  Respiratory: Negative  Cardiovascular: Negative  Gastrointestinal: Negative  Endocrine: Negative  Genitourinary: Negative  Musculoskeletal: Negative  Allergic/Immunologic: Negative  Neurological: Negative  Hematological: Negative  Psychiatric/Behavioral: Negative  no severe colds or ill ness throughout the pregnancy     Objective:   Pulse 90   Resp 16   Ht 5' 10" (1 778 m)   Wt 97 1 kg (214 lb)   LMP 10/29/2019 (Approximate)   BMI 30 71 kg/m²     Neurologic Exam     Mental Status   Oriented to person, place, and time  Attention: normal  Concentration: normal    Speech: speech is normal   Level of consciousness: alert  Knowledge: good  Cranial Nerves   Cranial nerves II through XII intact  CN III, IV, VI   Pupils are equal, round, and reactive to light  Right pupil: Shape: regular  Reactivity: brisk  Consensual response: intact  Left pupil: Shape: regular  Reactivity: brisk  Consensual response: intact  CN III: no CN III palsy  CN VI: no CN VI palsy  Nystagmus: none   Ophthalmoparesis: none    CN VII   Facial expression full, symmetric  CN XI   Right sternocleidomastoid strength: normal  Left sternocleidomastoid strength: normal  Right trapezius strength: normal  Left trapezius strength: normal    CN XII   Tongue: not atrophic  Tongue deviation: none    Motor Exam   Muscle bulk: normal    Gait, Coordination, and Reflexes     Gait  Gait: normal    Tremor   Resting tremor: absent  Intention tremor: absent    Reflexes   Right biceps: 2+  Left biceps: 2+  Right triceps: 2+  Left triceps: 2+  Right patellar: 2+  Left patellar: 2+  Right achilles: 2+  Left achilles: 2+  Right ankle clonus: absent  Left ankle clonus: absent      Physical Exam   Constitutional: She is oriented to person, place, and time     HENT:   Mouth/Throat: Mucous membranes are moist    Eyes: Pupils are equal, round, and reactive to light  Cardiovascular: Normal rate  Pulmonary/Chest: Effort normal    Musculoskeletal: Normal range of motion  Neurological: She is alert and oriented to person, place, and time  Gait normal    Reflex Scores:       Tricep reflexes are 2+ on the right side and 2+ on the left side  Bicep reflexes are 2+ on the right side and 2+ on the left side  Patellar reflexes are 2+ on the right side and 2+ on the left side  Achilles reflexes are 2+ on the right side and 2+ on the left side  Skin: Skin is warm  Capillary refill takes less than 2 seconds  Psychiatric: Her speech is normal  Mood normal        Studies Reviewed:      Routine Prenatal on 08/04/2020   Component Date Value Ref Range Status    Hematocrit 07/01/2020 31 5  % Final    External Hemoglobin 07/01/2020 10 3  g/dL Final    External Platelets 91/93/9056 278  K/µL Final    Glucose, GTT - 3 Hour 06/03/2020 91   Final    Glucose, Fasting 06/03/2020 79  mg/dL Final    Glucose, GTT 1 HR 06/03/2020 140   Final    Glucose, GTT 2 HOUR  06/03/2020 125   Final    RPR 07/01/2020 Non-Reactive   Final   Initial Prenatal on 05/21/2020   Component Date Value Ref Range Status    N gonorrhoeae, DNA Probe 05/21/2020 Negative  Negative Final    Chlamydia trachomatis, DNA Probe 05/21/2020 Negative  Negative Final   Appointment on 05/21/2020   Component Date Value Ref Range Status    Glucose 05/21/2020 150* <=134 mg/dL Final      Specimen collection should occur prior to Sulfasalazine administration due to the potential for falsely depressed results  Specimen collection should occur prior to Sulfapyridine administration due to the potential for falsely elevated results  Specimen collection should occur prior to Sulfasalazine administration due to the potential for falsely depressed results  Specimen collection should occur prior to Sulfapyridine administration due to the potential for falsely elevated results   Hgb A2 Quant 05/21/2020 2 2  1 8 - 3 2 % Final    Hgb C Quant 05/21/2020 0 0  0 0 % Final    Hgb F Quant 05/21/2020 0 0  0 0 - 2 0 % Final    Hgb S Quant 05/21/2020 0 0  0 0 % Final    Hgb Variant 05/21/2020 0 0  0 0 % Final    Hemoglobin Solubility 05/21/2020 Negative  Negative Final    Hgb Interp  05/21/2020 Comment   Final    Normal adult hemoglobin present      Hgb A 05/21/2020 97 8  96 4 - 98 8 % Final    Hepatitis C Ab 05/21/2020 Non-reactive  Non-reactive Final   Telemedicine on 05/20/2020   Component Date Value Ref Range Status    HIV-1/HIV-2 Ab 05/21/2020 Non-Reactive  Non-Reactive Final    Color, UA 05/21/2020 Yellow   Final    Clarity, UA 05/21/2020 Clear   Final    Specific [place], UA 05/21/2020 1 020  1 003 - 1 030 Final    pH, UA 05/21/2020 6 5  4 5, 5 0, 5 5, 6 0, 6 5, 7 0, 7 5, 8 0 Final    Leukocytes, UA 05/21/2020 Negative  Negative Final    Nitrite, UA 05/21/2020 Negative  Negative Final    Protein, UA 05/21/2020 Negative  Negative mg/dl Final    Glucose, UA 05/21/2020 100 (1/10%)* Negative mg/dl Final    Ketones, UA 05/21/2020 Negative  Negative mg/dl Final    Urobilinogen, UA 05/21/2020 0 2  0 2, 1 0 E U /dl E U /dl Final    Bilirubin, UA 05/21/2020 Negative  Negative Final    Blood, UA 05/21/2020 Negative  Negative Final    Urine Culture 05/21/2020 No Growth <1000 cfu/mL   Final    Hepatitis B Surface Ag 05/21/2020 Non-reactive  Non-reactive, NonReactive - Confirmed Final    WBC 05/21/2020 11 18* 4 31 - 10 16 Thousand/uL Final    RBC 05/21/2020 3 95  3 81 - 5 12 Million/uL Final    Hemoglobin 05/21/2020 11 4* 11 5 - 15 4 g/dL Final    Hematocrit 05/21/2020 35 8  34 8 - 46 1 % Final    MCV 05/21/2020 91  82 - 98 fL Final    MCH 05/21/2020 28 9  26 8 - 34 3 pg Final    MCHC 05/21/2020 31 8  31 4 - 37 4 g/dL Final    RDW 05/21/2020 14 4  11 6 - 15 1 % Final    MPV 05/21/2020 10 2  8 9 - 12 7 fL Final    Platelets 59/81/7621 288  149 - 390 Thousands/uL Final    nRBC 05/21/2020 0  /100 WBCs Final    Neutrophils Relative 05/21/2020 75  43 - 75 % Final    Immat GRANS % 05/21/2020 1  0 - 2 % Final    Lymphocytes Relative 05/21/2020 19  14 - 44 % Final    Monocytes Relative 05/21/2020 4  4 - 12 % Final    Eosinophils Relative 05/21/2020 1  0 - 6 % Final    Basophils Relative 05/21/2020 0  0 - 1 % Final    Neutrophils Absolute 05/21/2020 8 38* 1 85 - 7 62 Thousands/µL Final    Immature Grans Absolute 05/21/2020 0 10  0 00 - 0 20 Thousand/uL Final    Lymphocytes Absolute 05/21/2020 2 10  0 60 - 4 47 Thousands/µL Final    Monocytes Absolute 05/21/2020 0 47  0 17 - 1 22 Thousand/µL Final    Eosinophils Absolute 05/21/2020 0 10  0 00 - 0 61 Thousand/µL Final    Basophils Absolute 05/21/2020 0 03  0 00 - 0 10 Thousands/µL Final    Rubella IgG Quant 05/21/2020 23 7  >9 9 IU/mL Final    ABO Grouping 05/21/2020 A   Final    Rh Factor 05/21/2020 Positive   Final    Antibody Screen 05/21/2020 Negative   Final    Specimen Expiration Date 05/21/2020 [id number]   Final    RPR 05/21/2020 Non-Reactive  Non-Reactive Final   Admission on 05/16/2020, Discharged on 05/16/2020   Component Date Value Ref Range Status    EXT PREG TEST UR (Ref: Negative) 05/16/2020 POSITIVE   Final    Control 05/16/2020 VALID   Final    Color, UA 05/16/2020 Yellow   Final    Clarity, UA 05/16/2020 Clear   Final    pH, UA 05/16/2020 8 5* 4 5 - 8 0 Final    Leukocytes, UA 05/16/2020 Trace* Negative Final    Nitrite, UA 05/16/2020 Negative  Negative Final    Protein, UA 05/16/2020 Negative  Negative mg/dl Final    Glucose, UA 05/16/2020 Negative  Negative mg/dl Final    Ketones, UA 05/16/2020 Negative  Negative mg/dl Final    Urobilinogen, UA 05/16/2020 1 0  0 2, 1 0 E U /dl E U /dl Final    Bilirubin, UA 05/16/2020 Negative  Negative Final    Blood, UA 05/16/2020 Negative  Negative Final    Specific [place], UA 05/16/2020 1 020  1 003 - 1 030 Final    RBC, UA 05/16/2020 0-1* None Seen, 0-5 /hpf Final    WBC, UA 05/16/2020 1-2* None Seen, 0-5, 5-55, 5-65 /hpf Final    Epithelial Cells 05/16/2020 Occasional  None Seen, Occasional /hpf Final    Bacteria, UA 05/16/2020 Occasional  None Seen, Occasional /hpf Final    Urine Culture 05/16/2020 20,000-29,000 cfu/ml    Final    Mixed Contaminants X3       Fetal ultrasound 5/21/2020 by AMA Hicks No      Overview Addendum 8/4/2020 10:06 AM by AMA Hicks       5/19/20 (26w2d) EDC confirmed,  no previa, growth=44%, tara WNL w/missed views  7/1/20 (32w3d) growth=44%, enlarged cisterna magna, other tara WNL & complete  7/23/20 (35w4d) growth=39%, ambrosio cisterna magna (15mm) and inferior vermian hypoplasia               Final Assessment & Orders: Celeste Caba was seen today for fetal neurologic malformation, not applicable or unspecified  Diagnoses and all orders for this visit:    36 weeks gestation of pregnancy    Fetal neurologic abnormality          Thank you for involving me in [facility] care  Should you have any questions or concerns please do not hesitate to contact myself  This was a 45 minute visit, with greater than 50% of the time spent in discussion and counseling of all the above, including the assessment and plan  [facility] Caba was instructed to call with any questions or concerns upon returning home and prior to follow up

## 2020-08-06 LAB
C TRACH DNA SPEC QL NAA+PROBE: NEGATIVE
GP B STREP DNA SPEC QL NAA+PROBE: NORMAL
N GONORRHOEA DNA SPEC QL NAA+PROBE: NEGATIVE

## 2020-08-07 ENCOUNTER — HOSPITAL ENCOUNTER (INPATIENT)
Facility: HOSPITAL | Age: 25
LOS: 3 days | Discharge: HOME | DRG: 560 | End: 2020-08-10
Attending: OBSTETRICS & GYNECOLOGY | Admitting: OBSTETRICS & GYNECOLOGY
Payer: COMMERCIAL

## 2020-08-07 ENCOUNTER — TELEPHONE (OUTPATIENT)
Dept: OBGYN CLINIC | Facility: CLINIC | Age: 25
End: 2020-08-07

## 2020-08-07 DIAGNOSIS — Z3A.37 37 WEEKS GESTATION OF PREGNANCY: Primary | ICD-10-CM

## 2020-08-07 LAB
ABO GROUP BLD: NORMAL
BASOPHILS # BLD AUTO: 0.04 THOUSANDS/ΜL (ref 0–0.1)
BASOPHILS NFR BLD AUTO: 0 % (ref 0–1)
BLD GP AB SCN SERPL QL: NEGATIVE
EOSINOPHIL # BLD AUTO: 0.1 THOUSAND/ΜL (ref 0–0.61)
EOSINOPHIL NFR BLD AUTO: 1 % (ref 0–6)
ERYTHROCYTE [DISTWIDTH] IN BLOOD BY AUTOMATED COUNT: 15.8 % (ref 11.6–15.1)
HCT VFR BLD AUTO: 35 % (ref 34.8–46.1)
HGB BLD-MCNC: 10.8 G/DL (ref 11.5–15.4)
IMM GRANULOCYTES # BLD AUTO: 0.14 THOUSAND/UL (ref 0–0.2)
IMM GRANULOCYTES NFR BLD AUTO: 1 % (ref 0–2)
LYMPHOCYTES # BLD AUTO: 2.81 THOUSANDS/ΜL (ref 0.6–4.47)
LYMPHOCYTES NFR BLD AUTO: 21 % (ref 14–44)
MCH RBC QN AUTO: 26.1 PG (ref 26.8–34.3)
MCHC RBC AUTO-ENTMCNC: 30.9 G/DL (ref 31.4–37.4)
MCV RBC AUTO: 85 FL (ref 82–98)
MONOCYTES # BLD AUTO: 0.8 THOUSAND/ΜL (ref 0.17–1.22)
MONOCYTES NFR BLD AUTO: 6 % (ref 4–12)
NEUTROPHILS # BLD AUTO: 9.55 THOUSANDS/ΜL (ref 1.85–7.62)
NEUTS SEG NFR BLD AUTO: 71 % (ref 43–75)
NRBC BLD AUTO-RTO: 0 /100 WBCS
PLATELET # BLD AUTO: 261 THOUSANDS/UL (ref 149–390)
PMV BLD AUTO: 10 FL (ref 8.9–12.7)
RBC # BLD AUTO: 4.14 MILLION/UL (ref 3.81–5.12)
RH BLD: POSITIVE
SPECIMEN EXPIRATION DATE: NORMAL
WBC # BLD AUTO: 13.44 THOUSAND/UL (ref 4.31–10.16)

## 2020-08-07 PROCEDURE — 85025 COMPLETE CBC W/AUTO DIFF WBC: CPT | Performed by: OBSTETRICS & GYNECOLOGY

## 2020-08-07 PROCEDURE — 86850 RBC ANTIBODY SCREEN: CPT | Performed by: OBSTETRICS & GYNECOLOGY

## 2020-08-07 PROCEDURE — 99212 OFFICE O/P EST SF 10 MIN: CPT

## 2020-08-07 PROCEDURE — 86901 BLOOD TYPING SEROLOGIC RH(D): CPT | Performed by: OBSTETRICS & GYNECOLOGY

## 2020-08-07 PROCEDURE — 99024 POSTOP FOLLOW-UP VISIT: CPT | Performed by: OBSTETRICS & GYNECOLOGY

## 2020-08-07 PROCEDURE — 86900 BLOOD TYPING SEROLOGIC ABO: CPT | Performed by: OBSTETRICS & GYNECOLOGY

## 2020-08-07 PROCEDURE — 86592 SYPHILIS TEST NON-TREP QUAL: CPT | Performed by: OBSTETRICS & GYNECOLOGY

## 2020-08-07 RX ORDER — SODIUM CHLORIDE, SODIUM LACTATE, POTASSIUM CHLORIDE, CALCIUM CHLORIDE 600; 310; 30; 20 MG/100ML; MG/100ML; MG/100ML; MG/100ML
125 INJECTION, SOLUTION INTRAVENOUS CONTINUOUS
Status: DISCONTINUED | OUTPATIENT
Start: 2020-08-07 | End: 2020-08-08

## 2020-08-07 RX ORDER — BUTORPHANOL TARTRATE 1 MG/ML
1 INJECTION, SOLUTION INTRAMUSCULAR; INTRAVENOUS
Status: DISCONTINUED | OUTPATIENT
Start: 2020-08-07 | End: 2020-08-08

## 2020-08-07 RX ORDER — OXYTOCIN/RINGER'S LACTATE 30/500 ML
PLASTIC BAG, INJECTION (ML) INTRAVENOUS
Status: COMPLETED
Start: 2020-08-07 | End: 2020-08-08

## 2020-08-07 RX ADMIN — SODIUM CHLORIDE, SODIUM LACTATE, POTASSIUM CHLORIDE, AND CALCIUM CHLORIDE 999 ML/HR: .6; .31; .03; .02 INJECTION, SOLUTION INTRAVENOUS at 17:22

## 2020-08-07 NOTE — TELEPHONE ENCOUNTER
Patient called and reports having contractions every  3-4 min  For 4 hours and pelvic pressure  Patient instructed to go to [address] [place] [address] and Delivery for evaluation

## 2020-08-07 NOTE — H&P
Miguel [address] [facility] 26 y o  female MRN: [id number]  Unit/Bed#: L&D 322-01 Encounter: [id number]    Assessment: 26 y o  [id number] at 37w5d admitted for labor   SVE: /-2  FHT: Category II  Clinical EFW: 6lb ; Vertex confirmed by TAUS  GBS status: negative  Postpartum contraception plan: tubal ligation, MA- signed    Plan:   · Admit  · CBC, RPR, Type & Screen  · Analgesia at maternal request  · Expectant management  · IVF: LR @ 125cc/hr  · Clear liquid diet  · NICU contacted regarding fetal neurologic abnormality     Dr Eric ruby      Chief Complaint: contractions    HPI: Sofia Hays is a 26 y o  [id number] with an AVNESA of 2020, by Ultrasound at 37w5d who is being admitted for labor   She complains of uterine contractions, occurring every 3-4 minutes, has no LOF, and reports no VB  She states she has felt good FM  Pregnancy has been uncomplicated   She has history of two prior uncomplicated SVDs  Proven to 7lb 12oz  Patient Active Problem List   Diagnosis    Fetal ultrasound    Genetic screening    Contraception management    Short interval pregnancy    Abnormal glucola    Anemia in pregnancy    Fetal neurologic abnormality    37 weeks gestation of pregnancy       Baby complications/comments: Dandy-Walker variant fetal neurologic abnormality    Review of Systems   Constitutional: Negative for chills and fever  Eyes: Negative for visual disturbance  Respiratory: Negative for shortness of breath  Cardiovascular: Negative for chest pain  Gastrointestinal: Negative for abdominal pain, constipation, diarrhea, nausea and vomiting  Genitourinary: Negative for dysuria and hematuria  Musculoskeletal: Positive for back pain  Neurological: Negative for headaches         OB History    Para Term  AB Living   4 2 2 0 1 2   SAB TAB Ectopic Multiple Live Births   1 0 0 0 2      # Outcome Date GA Lbr Fabian/2nd Weight Sex Delivery Anes PTL Lv 4 Current            3 Term 06/21/19 38w2d / 00:59 3115 g (6 lb 13 9 oz) F Vag-Spont EPI N CLARITZA      Birth Comments: FOB1   2 SAB 2018     SAB         Birth Comments: FOB1   1 Term 10/25/17 38w0d  3515 g (7 lb 12 oz) F Vag-Spont  N CLARITZA      Birth Comments: FOB1       Past Medical History:   Diagnosis Date    No known health problems        Past Surgical History:   Procedure Laterality Date    NO PAST SURGERIES         Social History     Tobacco Use    Smoking status: Never Smoker    Smokeless tobacco: Never Used   Substance Use Topics    Alcohol use: No       No Known Allergies    Medications Prior to Admission   Medication    ferrous sulfate 324 (65 Fe) mg    Prenatal Vit-Fe Fumarate-FA (PREPLUS) 27-1 MG TABS       Objective: There is no height or weight on file to calculate BMI  Physical Exam:  Physical Exam  Constitutional:       Appearance: She is well-developed  HENT:      Head: Normocephalic and atraumatic  Cardiovascular:      Rate and Rhythm: Normal rate  Pulmonary:      Effort: Pulmonary effort is normal    Abdominal:      Palpations: Abdomen is soft  Tenderness: There is no abdominal tenderness  Neurological:      Mental Status: She is alert and oriented to person, place, and time  Skin:     General: Skin is warm and dry  Psychiatric:         Behavior: Behavior normal    Vitals signs reviewed            SVE:  Dilation: 6  Effacement (%): 70  Station: -2    FHT:  Baseline Rate: 130 bpm  Variability: Moderate 6-25 bpm  Accelerations: 15 x 15 or greater  Decelerations: (!) Late(two possible two late decelerations on presentation that resolved without intervention)  FHR Category: Category II    TOCO:   Contraction Frequency (minutes): 3-4  Contraction Quality: Moderate    Lab Results   Component Value Date    WBC 13 44 (H) 08/07/2020    HGB 10 8 (L) 08/07/2020    HCT 35 0 08/07/2020     08/07/2020     Lab Results   Component Value Date    K 3 6 01/02/2019     01/02/2019    CO2 26 01/02/2019    BUN 11 01/02/2019    CREATININE 0 92 01/02/2019    AST 12 01/02/2019    ALT 11 (L) 01/02/2019     Prenatal Labs: Reviewed      Blood type: A positive  Antibody: Negative  GBS:  Negative  HIV: Non-reactive  Rubella: Immune  VDRL/RPR: Non reactive  HBsAg: Negative  Chlamydia: Negative  Gonorrhea: Negative  Diabetes 1 hour screen: 150  3 hour glucose: 79, 140, 125, 91  Platelets: 480  Hgb: 10 3  >2 Midnights  INPATIENT     Signature/Title: Dusty Gamez MD  Date: 8/7/2020  Time: 5:42 PM

## 2020-08-07 NOTE — OB LABOR/OXYTOCIN SAFETY PROGRESS
Labor Progress Note - Zulma Cornejoo 26 y o  female MRN: [id number]    Unit/Bed#: L&D 322-01 Encounter: [id number]       Contraction Frequency (minutes): 2-4 5  Contraction Quality: Mild  Tachysystole: No   Cervical Dilation: 6        Cervical Effacement: 70  Fetal Station: -2  Baseline Rate: 125 bpm     FHR Category: Category II               Vital Signs:   Vitals:    08/07/20 1620   BP: 124/71   Pulse: 96   Resp: 18   Temp: 98 1 °F (36 7 °C)           Notes/comments:    Patient is fairly comfortable  Fetal heart tracing not concerning for acidemia at this time  Will continue to monitor  Portillo Downey MD 8/7/2020 7:25 PM

## 2020-08-08 ENCOUNTER — ANESTHESIA EVENT (INPATIENT)
Dept: ANESTHESIOLOGY | Facility: HOSPITAL | Age: 25
DRG: 560 | End: 2020-08-08
Payer: COMMERCIAL

## 2020-08-08 ENCOUNTER — ANESTHESIA (INPATIENT)
Dept: ANESTHESIOLOGY | Facility: HOSPITAL | Age: 25
DRG: 560 | End: 2020-08-08
Payer: COMMERCIAL

## 2020-08-08 LAB
BASE EXCESS BLDCOA CALC-SCNC: -3.2 MMOL/L (ref 3–11)
BASE EXCESS BLDCOV CALC-SCNC: -0.7 MMOL/L (ref 1–9)
HCO3 BLDCOA-SCNC: 22.4 MMOL/L (ref 17.3–27.3)
HCO3 BLDCOV-SCNC: 25.2 MMOL/L (ref 12.2–28.6)
O2 CT VFR BLDCOA CALC: 16.8 ML/DL
OXYHGB MFR BLDCOA: 70.7 %
OXYHGB MFR BLDCOV: 58.9 %
PCO2 BLDCOA: 42 MM[HG] (ref 30–60)
PCO2 BLDCOV: 46.2 MM HG (ref 27–43)
PH BLDCOA: 7.34 [PH] (ref 7.23–7.43)
PH BLDCOV: 7.36 [PH] (ref 7.19–7.49)
PO2 BLDCOA: 30.2 MM HG (ref 5–25)
PO2 BLDCOV: 24.5 MM HG (ref 15–45)
SAO2 % BLDCOV: 13 ML/DL

## 2020-08-08 PROCEDURE — 4A1HXCZ MONITORING OF PRODUCTS OF CONCEPTION, CARDIAC RATE, EXTERNAL APPROACH: ICD-10-PCS | Performed by: OBSTETRICS & GYNECOLOGY

## 2020-08-08 PROCEDURE — 10907ZC DRAINAGE OF AMNIOTIC FLUID, THERAPEUTIC FROM PRODUCTS OF CONCEPTION, VIA NATURAL OR ARTIFICIAL OPENING: ICD-10-PCS | Performed by: OBSTETRICS & GYNECOLOGY

## 2020-08-08 PROCEDURE — 59409 OBSTETRICAL CARE: CPT | Performed by: OBSTETRICS & GYNECOLOGY

## 2020-08-08 PROCEDURE — 82805 BLOOD GASES W/O2 SATURATION: CPT | Performed by: OBSTETRICS & GYNECOLOGY

## 2020-08-08 PROCEDURE — 99024 POSTOP FOLLOW-UP VISIT: CPT | Performed by: OBSTETRICS & GYNECOLOGY

## 2020-08-08 RX ORDER — ROPIVACAINE HYDROCHLORIDE 2 MG/ML
INJECTION, SOLUTION EPIDURAL; INFILTRATION; PERINEURAL CONTINUOUS PRN
Status: DISCONTINUED | OUTPATIENT
Start: 2020-08-08 | End: 2020-08-08 | Stop reason: HOSPADM

## 2020-08-08 RX ORDER — IBUPROFEN 600 MG/1
600 TABLET ORAL EVERY 6 HOURS PRN
Status: DISCONTINUED | OUTPATIENT
Start: 2020-08-08 | End: 2020-08-10 | Stop reason: HOSPADM

## 2020-08-08 RX ORDER — ROPIVACAINE HYDROCHLORIDE 2 MG/ML
INJECTION, SOLUTION EPIDURAL; INFILTRATION; PERINEURAL AS NEEDED
Status: DISCONTINUED | OUTPATIENT
Start: 2020-08-08 | End: 2020-08-08 | Stop reason: HOSPADM

## 2020-08-08 RX ORDER — DIAPER,BRIEF,INFANT-TODD,DISP
1 EACH MISCELLANEOUS 4 TIMES DAILY PRN
Status: DISCONTINUED | OUTPATIENT
Start: 2020-08-08 | End: 2020-08-10 | Stop reason: HOSPADM

## 2020-08-08 RX ORDER — MAGNESIUM HYDROXIDE/ALUMINUM HYDROXICE/SIMETHICONE 120; 1200; 1200 MG/30ML; MG/30ML; MG/30ML
15 SUSPENSION ORAL EVERY 6 HOURS PRN
Status: DISCONTINUED | OUTPATIENT
Start: 2020-08-08 | End: 2020-08-10 | Stop reason: HOSPADM

## 2020-08-08 RX ORDER — SENNOSIDES 8.6 MG
1 TABLET ORAL DAILY
Status: DISCONTINUED | OUTPATIENT
Start: 2020-08-08 | End: 2020-08-10 | Stop reason: HOSPADM

## 2020-08-08 RX ORDER — OXYCODONE HYDROCHLORIDE AND ACETAMINOPHEN 5; 325 MG/1; MG/1
2 TABLET ORAL EVERY 4 HOURS PRN
Status: DISCONTINUED | OUTPATIENT
Start: 2020-08-08 | End: 2020-08-10 | Stop reason: HOSPADM

## 2020-08-08 RX ORDER — DOCUSATE SODIUM 100 MG/1
100 CAPSULE, LIQUID FILLED ORAL 2 TIMES DAILY
Status: DISCONTINUED | OUTPATIENT
Start: 2020-08-08 | End: 2020-08-10 | Stop reason: HOSPADM

## 2020-08-08 RX ORDER — ROPIVACAINE HYDROCHLORIDE 2 MG/ML
INJECTION, SOLUTION EPIDURAL; INFILTRATION; PERINEURAL
Status: COMPLETED
Start: 2020-08-08 | End: 2020-08-08

## 2020-08-08 RX ORDER — LIDOCAINE HYDROCHLORIDE AND EPINEPHRINE 15; 5 MG/ML; UG/ML
INJECTION, SOLUTION EPIDURAL
Status: COMPLETED | OUTPATIENT
Start: 2020-08-08 | End: 2020-08-08

## 2020-08-08 RX ORDER — SIMETHICONE 80 MG
80 TABLET,CHEWABLE ORAL 4 TIMES DAILY PRN
Status: DISCONTINUED | OUTPATIENT
Start: 2020-08-08 | End: 2020-08-10 | Stop reason: HOSPADM

## 2020-08-08 RX ORDER — ACETAMINOPHEN 325 MG/1
650 TABLET ORAL EVERY 4 HOURS PRN
Status: DISCONTINUED | OUTPATIENT
Start: 2020-08-08 | End: 2020-08-10 | Stop reason: HOSPADM

## 2020-08-08 RX ORDER — CALCIUM CARBONATE 200(500)MG
1000 TABLET,CHEWABLE ORAL DAILY PRN
Status: DISCONTINUED | OUTPATIENT
Start: 2020-08-08 | End: 2020-08-10 | Stop reason: HOSPADM

## 2020-08-08 RX ORDER — OXYCODONE HYDROCHLORIDE AND ACETAMINOPHEN 5; 325 MG/1; MG/1
1 TABLET ORAL EVERY 4 HOURS PRN
Status: DISCONTINUED | OUTPATIENT
Start: 2020-08-08 | End: 2020-08-10 | Stop reason: HOSPADM

## 2020-08-08 RX ADMIN — Medication 250 MILLI-UNITS/MIN: at 12:39

## 2020-08-08 RX ADMIN — WITCH HAZEL 1 PAD: 500 SOLUTION RECTAL; TOPICAL at 14:33

## 2020-08-08 RX ADMIN — ROPIVACAINE HYDROCHLORIDE 5 ML: 2 INJECTION, SOLUTION EPIDURAL; INFILTRATION at 09:23

## 2020-08-08 RX ADMIN — BENZOCAINE AND LEVOMENTHOL: 200; 5 SPRAY TOPICAL at 14:33

## 2020-08-08 RX ADMIN — ROPIVACAINE HYDROCHLORIDE: 2 INJECTION, SOLUTION EPIDURAL; INFILTRATION at 09:36

## 2020-08-08 RX ADMIN — IBUPROFEN 600 MG: 600 TABLET, FILM COATED ORAL at 18:33

## 2020-08-08 RX ADMIN — ROPIVACAINE HYDROCHLORIDE 5 ML: 2 INJECTION, SOLUTION EPIDURAL; INFILTRATION at 09:24

## 2020-08-08 RX ADMIN — LIDOCAINE HYDROCHLORIDE AND EPINEPHRINE 3 ML: 15; 5 INJECTION, SOLUTION EPIDURAL at 09:20

## 2020-08-08 RX ADMIN — ROPIVACAINE HYDROCHLORIDE 10 ML/HR: 2 INJECTION, SOLUTION EPIDURAL; INFILTRATION at 09:24

## 2020-08-08 RX ADMIN — DOCUSATE SODIUM 100 MG: 100 CAPSULE, LIQUID FILLED ORAL at 18:31

## 2020-08-08 NOTE — ANESTHESIA PREPROCEDURE EVALUATION
Procedure:  LABOR ANALGESIA    Relevant Problems   ANESTHESIA (within normal limits)      CARDIO (within normal limits)      ENDO (within normal limits)      GI/HEPATIC (within normal limits)      /RENAL (within normal limits)      GYN (within normal limits)   (+) 37 weeks gestation of pregnancy      HEMATOLOGY   (+) Anemia in pregnancy      MUSCULOSKELETAL (within normal limits)      NEURO/PSYCH (within normal limits)      PULMONARY (within normal limits)        Physical Exam    Airway    Mallampati score: II  TM Distance: >3 FB  Neck ROM: full     Dental       Cardiovascular  Cardiovascular exam normal    Pulmonary  Pulmonary exam normal     Other Findings        Anesthesia Plan  ASA Score- 2     Anesthesia Type- epidural with ASA Monitors  Additional Monitors:   Airway Plan:           Plan Factors-Exercise tolerance (METS): >4 METS  Chart reviewed  Patient is not a current smoker  Patient not instructed to abstain from smoking on day of procedure  Patient did not smoke on day of surgery  Induction-     Postoperative Plan-     Informed Consent- Anesthetic plan and risks discussed with patient

## 2020-08-08 NOTE — DISCHARGE SUMMARY
Discharge Summary - OB/GYN   Ap EnriqueRubi 26 y o  female MRN: [id number]  Unit/Bed#: L&D 322-01 Encounter: [id number]      Admission Date: 2020     Discharge Date: 2020    Admitting Diagnosis:   1  Pregnancy at 37w5d  2  Late PNC  3  Fetal Dandy-Walker variant  4  GBS negative  5  Labor    Discharge Diagnosis:   Same, delivered    Procedures: spontaneous vaginal delivery    Delivery Attending: Bety Jose MD  Discharge Attending: Dr Carl Dwyer Course:     Ms Keyonna Batista is a 26 y o  [id number] at 37wk5d  She presented to labor and delivery for labor  Her pregnancy was complicated by those listed above  On exam in triage she was noted to be 6/70/-2  She was admitted for labor  She eventually made change to 6-7/80/-2 and had AROM  She then quickly progressed to complete dilation  She delivered a viable male  on 2020 at 26  Weight 7lbs 10oz via spontaneous vaginal delivery  Apgars were 9 (1 min) and 9 (5 min)   was transferred to  nursery  Patient tolerated the procedure well and was transferred to recovery in stable condition  Her postpartum course was uncomplicated    Her postpartum pain was well controlled with oral analgesics  On day of discharge, she was ambulating and able to reasonably perform all ADLs  She was voiding and had appropriate bowel function  Pain was well controlled  She was discharged home on post-partum day #2 without complications  Patient was instructed to follow up with her OB as an outpatient and was given appropriate warnings to call provider if she develops signs of infection or uncontrolled pain  Complications: none apparent    Condition at discharge: good     Discharge instructions/Information to patient and family:   - Do not place anything (no partner, tampons or douche) in your vagina for 6 weeks    -You may walk for exercise for the first 6 weeks then gradually return to your usual activities    -Please do not drive for 1 week if you have no stitches and for 2 weeks if you have stitches or underwent a  delivery     -You may take baths or shower per your preference    -Please look at your bust (breasts) in the mirror daily and call for redness or tenderness or increased warmth    -Please call us for temperature > 100 4*F or 38* C, worsening pain or a foul discharge  Discharge Medications:   Prenatal vitamin daily for 6 months or the duration of nursing whichever is longer    Motrin 600 mg orally every 6 hours as needed for pain  Tylenol (over the counter) per bottle directions as needed for pain: do NOT use with percocet  Hydrocortisone cream 1% (over the counter) applied 1-2x daily to hemorrhoids as needed    Planned Readmission: No    Ian Alatorre MD  PGY-1 OB/GYN   8/10/2020 6:22 AM

## 2020-08-08 NOTE — OB LABOR/OXYTOCIN SAFETY PROGRESS
Labor Progress Note - Renay Lipscomb 26 y o  female MRN: [id number]    Unit/Bed#: L&D 322-01 Encounter: [id number]       Contraction Frequency (minutes): 2-3  Contraction Quality: Moderate  Tachysystole: No   Cervical Dilation: 7-8        Cervical Effacement: 80  Fetal Station: -2  Baseline Rate: 130 bpm     FHR Category: Category I             Notes/comments:   Patient reporting rectal pressure  SVE as above  Patient wanting epidural at this time  Continue expectant management    Debbie Nelson MD 8/8/2020 8:45 AM

## 2020-08-08 NOTE — ANESTHESIA PROCEDURE NOTES
Epidural Block    Patient location during procedure: OB  Start time: 8/8/2020 9:22 AM  Reason for block: at surgeon's request  Staffing  Anesthesiologist: Willem Velazquez DO  Performed: anesthesiologist   Preanesthetic Checklist  Completed: patient identified, site marked, surgical consent, pre-op evaluation, timeout performed, IV checked, risks and benefits discussed and monitors and equipment checked  Epidural  Patient position: sitting  Prep: Betadine  Patient monitoring: heart rate, cardiac monitor, continuous pulse ox and frequent blood pressure checks  Approach: midline  Location: lumbar (1-5)  Injection technique: GRISEL saline  Needle  Needle type: Tuohy   Needle gauge: 18 G  Catheter type: side hole  Catheter size: 20 G  Catheter at skin depth: 13 cm  Test dose: negativelidocaine 1 5% with epinephrine 1:200,000 test dose, 3 mL  Assessment  Sensory level: [id number] aspiration for CSF, negative aspiration for heme and no paresthesia on injection  patient tolerated the procedure well with no immediate complications

## 2020-08-08 NOTE — LACTATION NOTE
This note was copied from a baby's chart  Met with mother  Provided mother with Ready, Set, Baby booklet  Discussed Skin to Skin contact an benefits to mom and baby  Talked about the delay of the first bath until baby has adjusted  Spoke about the benefits of rooming in  Feeding on cue and what that means for recognizing infant's hunger  Avoidance of pacifiers for the first month discussed  Talked about exclusive breastfeeding for the first 6 months  Positioning and latch reviewed as well as showing images of other feeding positions  Discussed the properties of a good latch in any position  Reviewed hand/manual expression  Discussed s/s that baby is getting enough milk and some s/s that breastfeeding dyad may need further help  Gave information on common concerns, what to expect the first few weeks after delivery, preparing for other caregivers, and how partners can help  Resources for support also provided  Baby at right breast in cradle hold at time of visit  Mom expressed comfort with latch and position  Encouraged parents to call for assistance, questions, and concerns about breastfeeding  Extension provided

## 2020-08-08 NOTE — OB LABOR/OXYTOCIN SAFETY PROGRESS
Labor Progress Note - Tom Lorenzo 26 y o  female MRN: [id number]    Unit/Bed#: L&D 322-01 Encounter: [id number]       Contraction Frequency (minutes): 1 5-3  Contraction Quality: Strong  Tachysystole: No   Cervical Dilation: 10  Dilation Complete Date: 08/08/20  Dilation Complete Time: 1130  Cervical Effacement: 100  Fetal Station: 0  Baseline Rate: 130 bpm     FHR Category: Category I           Vital Signs:  Vitals:    08/08/20 1039   BP: 101/52   Pulse: 75   Resp:    Temp:            Notes/comments:   SVE as above  Plan to start pushing shortly    Lisa Whalen MD 8/8/2020 11:33 AM

## 2020-08-08 NOTE — OB LABOR/OXYTOCIN SAFETY PROGRESS
Labor Progress Note - Dina Vargas 26 y o  female MRN: [id number]    Unit/Bed#: L&D 322-01 Encounter: [id number]       Contraction Frequency (minutes): 6-12  Contraction Quality: Moderate  Tachysystole: No   Cervical Dilation: 6        Cervical Effacement: 70  Fetal Station: -2  Baseline Rate: 120 bpm     FHR Category: Category I               Vital Signs:   Vitals:    08/07/20 2331   BP: 130/63   Pulse: 78   Resp: 18   Temp: 97 9 °F (36 6 °C)           Notes/comments:    Patient is still relatively comfortable  Fetal heart tracing not concerning for acidemia at this time  Cervical exam is as above and unchanged  Patient is brooke every 5-10 minutes  Will continue to monitor      Moraima Santana MD 8/8/2020 5:02 AM

## 2020-08-08 NOTE — DISCHARGE INSTRUCTIONS
Cuidado personal después del parto   CUIDADO AMBULATORIO:   El periodo postparto  es el periodo de tiempo desde el momento de abdiel a maureen hasta por lo menos 6 semanas  Shannan dianne tiempo usted puede experimentar muchos cambios físicos daniela emocionales  Es muy importante entender que es lo normal y cuándo es necesario llamar a jones médico  También es importante saber daniela cuidarse a sí misma shannan dianne periodo  Llame al 911 en [place] de presentar lo siguiente:   · Usted empapa courtney toalla higiénica en 15 minutos, tiene visión borrosa, piel húmeda o pálida y siente que se va a desmayar  · Usted se desmaya o pierde el conocimiento  · Jones corazón está latiendo más rápido de lo normal      · Usted tiene dificultad para respirar  · Usted expectora caryl  Busque atención médica de inmediato si:   · Usted empapa 1 o más toallas higiénicas en 1 hora, o de jones vagina le salen unos coágulos de caryl más grandes que courtney moneda de 25 ORLÉANS  · Ojnes pierna está Roxine Merck y [place] alex de lo normal      · Usted tiene dolor abdominal intenso  · Usted tiene un karen dolor de gail o cambios en jones visión  · La incisión de la episiotomía o de la cesárea está daily, inflamada, sangrando o supurando pus  · La incisión se abre  · Usted ve o escucha cosas que no existen o tiene pensamientos de [place] daño a sí misma o de lastimar a jones bebé  Comuníquese con jones obstetra o la partera sí:   · Usted tiene fiebre  · Usted le comienza o se le empeora el dolor en jones abdomen o vagina  · Usted sigue con tristeza de maternidad 10 alva después del parto  · Usted tiene dificultad para dormir  · Usted tiene flujo vaginal con mal olor  · Usted tiene dolor o ardor al orinar  · Usted no tiene evacuaciones intestinales por 3 días o más  · Usted tiene náuseas o está vomitando  · Usted tiene unos bultos duros o lodnon [place] por encima de joslyn senos       · Usted tiene los [employer]IG[employer] cuarteados o le están sangrando  · Usted tiene preguntas o inquietudes acerca de jones condición o cuidado  Cambios físicos:  A continuación están los cambios normales después de abdiel a maureen:  · Dolor en el área entre el ano y la vagina    · Dolor en el pecho    · Estreñimiento o hemorroides    · Golpes de calor o frío    · Sangrado o secreción vaginal    · Cólicos abdominales de leves a moderados    · Dificultad para controlar las deposiciones o la orina  Cambios emocionales:  Los siguientes son los síntomas de la tristeza de maternidad o de las emociones normales después de abdiel a maureen  El cambio en joslyn emociones puede ser causado por un bajón en los niveles hormonales después del [place]  Si estos síntomas payne más de 1 a 2 [address] de abdiel a maureen, usted podría tener depresión posparto  · Sentirse irritable    · Sentimiento de tristeza    · Llora sin razón    · Sentimiento de ansiedad  Cuidado de los senos para mamás lactantes:  Usted puede tener los senos adoloridos shannan 3 a 6 días después de abdiel a maureen  [place] sucede a medida que la [place] a llenar joslyn senos  Es posible que también tenga los senos adoloridos en [place] que usted no esté dando de lactar a jones bebé con frecuencia  Candace lo siguiente para cuidar de joslyn senos:  · Aplique courtney compresa húmeda y tibia en joslyn senos según las indicaciones  [place] puede servir para calmar el dolor en joslyn senos  Asegúrese que el paño no esté muy caliente antes de colocarlo en jones pecho  · Alimente al bebé o sáquese leche con frecuencia  [place] puede prevenir la congestión de los conductos de [place]  Pregunte a jones médico con qué frecuencia debe alimentar a jones bebé o sacarse leche  · Dese masajes en los senos según las indicaciones  [place] puede ayudar a aumentar el flujo de [place]  Frote con suavidad de forma circular los senos antes de la lactancia  Es posible que necesite apretar con suavidad jones pecho o pezón para ayudar a que salga la leche   Usted también puede usar un extractor de [place] para ayudar a [employer] de joslyn senos  · Lávese los senos sólo con agua tibia  No use jabón en joslyn pezones  El jabón puede [employer]  · Aplique crema de lanolina en joslyn pezones según las indicaciones  La crema de lanonila puede servir para humectar jones piel y evitar que los pezones se resequen  Siempre  debe quitarse la crema de lanolina con agua tibia antes de darle pecho a jones bebé  · Use protectores para la lactancia en jones brasier o sostén  La [employer] salir de joslyn pezones cuando usted no está dando de lactar  Usted se puede colocar los protectores dentro de jones brasier para evitar que la [place] se traspase a jones ropa  Pregunte a jones médico sobre donde puede [employer] protectores para lactancia  · Solicite asistencia para la lactancia materna si lo necesita  Existen médicos que le pueden contestar las preguntas sobre la [employer] y brindar [de-identified]  Pregunte a jones médico con quién puede asesorarse si necesita asistencia con la lactancia  El cuidado de los senos para las madres que dan biberón con formula:  La leche materna llenará joslyn pechos incluso si usted alimenta a jones bebé con leche de formula  A continuación unas cosas que puede hacer que ayudan a que deje de producir [place] y que joslyn senos se llenen y le provoque dolor:  · Use un sostén o brasier de soporte en todo momento  Un brasier deportivo o un sostén Sarah Quentin puede ayudar a suspender la producción de [place]  · Aplique hielo en cada seno por 15 a 20 minutos cada hora según las indicaciones  Use un paquete de hielo o ponga hielo molido dentro de [employer]  Cúbrala con courtney toalla  El hielo ayuda a encoger los conductos de [place]  · Evite que le caiga agua tibia en joslyn senos  El agua tibia hará que sea más fácil que la leche llene joslyn pechos  El la ducha póngase de espaldas al agua  · Limite la cantidad de tiempo que toca joslyn senos    [place] evitará que los senos se llenen de [place]  Cuidado del perineo:  El perineo es el área entre el recto y la vagina  Es normal tener inflamación y dolor en esta área después de abdiel a maureen  Si a usted le hicieron courtney episiotomía, jones médico le proporcionará instrucciones especiales  · Limpie el perineo después de ir al baño  [place] puede prevenir courtney infección y [de-identified] para la cicatrización  Use courtney botella de agua tibia con atomizador para limpiar el perineo  También puede rociar suavemente agua tibia contra el perineo mientras orina  Siempre debe limpiarse de adelante hacia atrás  · [address]  Un baño de asiento puede servir para aliviar la inflamación y el dolor  Llene la cheri o un recipiente con agua hasta que [place] joslyn caderas y siéntese en el agua  Use agua fría por 2 días después del parto  Luego use agua tibia  Pregunte a jones médico por más [employer] rodney de [place]  · Aplique compresas de hielo por las primeras 24 horas o [address] indicaciones  Use un guante médico y llénelo con hielo o compre compresas de hielo  Envuelva la compresa de hielo o el guante en courtney toalla o paño pequeño  Coloque la compresa de hielo en el perineo por 20 minutos cada vez  · Siéntese en un cojín en forma de calvin  [place] puede ayudar [employer] presión que se ejerce en jones perineo cuando se sienta  · Use toallitas medicadas o tome pastillas según las indicaciones  Jones médico puede indicarle que use toallitas de hamamelis  Usted puede colocar las toallitas de hamamelis en el refrigerados antes de aplicarlas en el perineo  También le puede indicar que tome un analgésico antiinflamatorio  Pregunte a jones médico con que frecuencia usar las pastillas o usar las toallitas con medicamento  · No vaya a nadar ni tome rodney en cheri por 4 a 6 semanas o según las indicaciones  [place] servirá para evitar courtney infección en jones útero o vagina     El cuidado intestinal y de la vejiga:  [place] Vivian puede mega entre 3 a 5 alva para tener courtney evacuación intestinal después de abdiel a maureen a jones bebé  Usted puede hacer lo siguiente para prevenir o controlar el estreñimiento y tener el control de joslyn intestinos o vejiga:  · [address] [facility] indicaciones  Un ablandador fecal es un medicamento que hará que joslyn movimiento intestinal de materia fecal sea [address]  [place] puede prevenir o aliviar el estreñimiento  Un ablandador fecal además puede hacer que la evacuación intestinal duela menos  · [place] suficiente líquidos  Pregunte cuánto líquido debe mega cada día y cuáles líquidos son los más adecuados para usted  Los líquidos pueden ayudar a prevenir el estreñimiento  · Ong alimentos ricos en fibras  [address] frutas, verduras, granos, frijoles y lentejas  Pregunte a jones médico cuál es la cantidad de fibra que necesita al día  La fibra puede prevenir el estreñimiento  · Samia los ejercicios de [place] según las indicaciones  Los ejercicios de [place] sirven para fortalecer los músculos que [employer] movimientos intestinales y la [place]  Pídale a jones médico más [employer] ejercicios de [place]  · Aplique courtney compresa o medicamento para la hemorroides según las indicaciones  [place] puede aliviar la inflamación y el dolor  Jones médico le puede indicar que use hielo o pañitos medicados para la hemorroides  También le puede indicar que se samia rodney tibios de [place]  Pregunte a jones médico por mayor información sobre cómo controlar la hemorroides  Nutrición:  Ced Divehi buena nutrición es importante en el periodo posparto  La nutrición ayuda a que usted regrese a jones peso saludable, aumenta el nivel de energía y daniel el estreñimiento  También sirve para que [employer] suficientes nutrientes y calorías si usted va a alimentar a jones wojciech con [place]  · Consuma alimentos saludables y variados    Los alimentos saludables incluyen frutas, verduras, pan integral, productos lácteos bajos en grasa, frijoles, kirk magras y pescado  Usted puede [employer] 500 a 700 calorías adicionales al día si usted está dando de lactar a jones bebé  Puede que también necesite añadir proteína  · Limite los alimentos con azúcar añadida y grandes cantidades de [place]  [place] alimentos son altos en calorías y bajos en nutrientes saludables  Nadine las etiquetas de los alimentos para que sepa cuál es la cantidad de azúcar y grasas que contiene los alimentos que quiere comer  · Google 8 a 10 vasos de agua al día  El agua le ayudará a producir suficiente leche para jones bebé  También le ayudará a prevenir el estreñimiento  Nissa un vaso de agua cada vez que amamanta a jones bebé  · [address]  Pregunte a jones médico cuáles vitaminas necesita  · Limite la cafeína y el alcohol si usted está alimentando a jones bebé con [employer]  Benjamine Breath y el alcohol pueden pasar a la [employer]  Benjamine Breath y el alcohol pueden hacer que jones bebé esté molesto  Toyei también interfiere con el sueño de jones bebé  Pregunte a jones médico si usted puede mega alcohol o cafeína  Descanse y duerma:  Usted se puede sentir muy cansada en el periodo posparto  Dormir lo suficiente le ayudará a recuperarse y tener la energía para cuidar de jones bebé  Los siguientes pueden ayudarle a dormir y descansar:  · Duerma cuando jones bebé esté tomando la siesta  Jones bebé puede mega varias siestas shannan el día  Descanse shannan [employer]  · [address]  Muchas personas quieren venir a visitarla a usted y conocer al bebé  Pídale a joslyn amigos y familiares que la visiten en diferentes días  Toyei le dará tiempo para descansar  · No planee demasiadas cosas en un día  Deje los quehaceres de la casa para que tenga tiempo para descansar  Poco a poca samia más cada día  · Solicite ayuda de familiares, amigos y vecinos  Pida que le ayuden a isreal la ropa, a limpiar o hacer mandados   También pregunte que alguien le cuide jones bebé [place] man[place]et courtney siesta o se relaja  Pídale a jones jennifer que la ayude con el cuidado del bebé  Sáquese leche para que jones jennifer pueda alimentar a jones bebé por la noche  Ejercicios después del parto:  Espere hasta que jones médico la autorice a hacer ejercicio  El ejercicio puede ayudarla adelgazar, aumentar jones niveles de energía y controlar jones estado de ánimo  También puede prevenir el estreñimiento y los coágulos  Empiece con un ejercicio leve daniela caminar  Semprius a medida que tenga [place]  Es posible que necesite evitar hacer ejercicios para el abdomen por 1 a 2 semana después del parto  Hable con jones médico sobre un plan de ejercicios que sea adecuado para usted  Actividad sexual después del parto:   · No tenga relaciones sexuales hasta que jones médico lo autorice  Es posible que necesite esperar de 4 a 6 semanas antes de Cherylyn Silvino relación sexual  [place] puede evitar que contraiga courtney infección y darle tiempo para recuperarse  · Jones ciclo menstrual puede comenzar tan pronto daniela en 3 semanas después de abdiel a maureen  Jones período puede demorarse si usted está dando de lactar a jones bebé  Usted puede quedar embarazada antes de que le venga jones primer período posparto  Consulte con jones médico sobre los anticonceptivos que son los adecuados para usted  Algunos tipos de anticonceptivos no son Santi Cordia  Para [de-identified] y más información:  Participe en un vamsi de apoyo para madres primerizas  Pídale ayuda a familiares y [facility] con los [address] de la casa, [place] y el cuidado de jones bebé     · Office of Women's Health,  Department of Health and Human Services  [address] [facility], [address] [facility] [address]  [address], [address] [facility] , [facility] 178  Phone: 53 Petra Arturo  Web Address: www womenshealth gov  · [facility]  [address] [facility] , [address] [facility]  [address] [facility] , [address] [facility]  Web Address: [employer]s [email or web address]  [email or web address]  Programe courtney emmanuel de control con reina obstetra o partera según las indicaciones:  Usted tendrá que acudir [place] 2 a 6 semanas a courtney emmanuel de control con reina médico después del parto  Escriba las preguntas que tenga para que recuerde hacerlas shannan joslyn citas  © 2017 [address] Information is for End User's use only and may not be sold, redistributed or otherwise used for commercial purposes  All illustrations and images included in CareNotes® are the copyrighted property of A D A M , Inc  or Jean Peterson  Esta información es sólo para uso en educación  Reina intención no es darle un consejo médico sobre enfermedades o tratamientos  Colsulte con reina Charna Heckler farmacéutico antes de seguir cualquier régimen médico para saber si es seguro y efectivo para usted

## 2020-08-08 NOTE — OB LABOR/OXYTOCIN SAFETY PROGRESS
Labor Progress Note - Sasha Dave 26 y o  female MRN: [id number]    Unit/Bed#: L&D 322-01 Encounter: [id number]       Contraction Frequency (minutes): 6-9  Contraction Quality: Moderate  Tachysystole: No   Cervical Dilation: 6-7        Cervical Effacement: 80  Fetal Station: -2  Baseline Rate: 130 bpm     FHR Category: Category I             Notes/comments:   Patient was previously 6cm, stable overnight  At this time, SVE is as above, very stretchy  AROM, slightly yellow fluid  Continue expectant management         Tremaine Amanda MD 8/8/2020 7:27 AM

## 2020-08-08 NOTE — OB LABOR/OXYTOCIN SAFETY PROGRESS
Labor Progress Note - Kary Byers 26 y o  female MRN: [id number]    Unit/Bed#: L&D 322-01 Encounter: [id number]       Contraction Frequency (minutes): 2-4 5  Contraction Quality: Mild  Tachysystole: No   Cervical Dilation: 6        Cervical Effacement: 70  Fetal Station: -2  Baseline Rate: 125 bpm     FHR Category: Category I               Vital Signs:   Vitals:    08/07/20 1620   BP: 124/71   Pulse: 96   Resp: 18   Temp: 98 1 °F (36 7 °C)           Notes/comments:    Patient is fairly comfortable  She is feeling some painful contractions occasionally  Her cervical exam is unchanged  Will continue to monitor      Esperanza Cuello MD 8/7/2020 11:06 PM

## 2020-08-08 NOTE — ANESTHESIA POSTPROCEDURE EVALUATION
Post-Op Assessment Note    CV Status:  Stable  Pain Score: 0    Pain management: adequate     Mental Status:  Alert and awake   Hydration Status:  Euvolemic and stable   PONV Controlled:  Controlled   Airway Patency:  Patent      Post Op Vitals Reviewed: Yes      Staff: Anesthesiologist     Post-op block assessment: site cleaned, catheter intact and no complications      No complications documented      /58   Pulse 77   Temp 98 6 °F (37 °C)   Resp 16   Ht 5' 10" (1 778 m)   Wt 97 1 kg (214 lb)   LMP 10/29/2019 (Approximate)   Breastfeeding Unknown   BMI 30 71 kg/m²       BP      Temp      Pulse     Resp      SpO2

## 2020-08-08 NOTE — L&D DELIVERY NOTE
Vaginal Delivery Note - OB/GYN   Dina Vargas 26 y o  female MRN: [id number]  Unit/Bed#: L&D 322-01 Encounter: [id number]          Predelivery Diagnosis:  1  Pregnancy at 37w5d  2  Late PNC  3  Fetal Dandy-Walker variant  4  GBS negative  5  Labor    Postdelivery Diagnosis:  1  Same as above  2  Delivery of term     Procedure: Spontaneous Vaginal Delivery    Attending: Hayley Farmer    Assistant: Negra Lindquist    Anesthesia: Epidural    QBL: 80cc  Admission Hg: 10 8  Admission platelets: 804N    Complications: none apparent    Specimens: cord blood, arterial and venous cord blood gasses, placenta to storage    Findings:   1  Viable male at 1236, with APGARS of 9 and 9 at 1 and 5 minutes respectively,  2  Spontaneous delivery of intact placenta at 1240  3  No perineal lacerations  4  Blood gases:   Arterial pH: 7 345   Arterial base excess: -3 2   Venous pH: 7 355   Venous base excess: -0 7    Disposition:  Patient tolerated the procedure well and was recovering in labor and delivery room     Brief history and labor course:  Ms Dina Vargas is a 26 y o  [id number] at 37wk5d  She presented to labor and delivery for labor  Her pregnancy was complicated by those listed above  On exam in triage she was noted to be 6/70/-2  She was admitted for labor  She eventually made change to 6-7/80/-2 and had AROM  She then quickly progressed to complete dilation  Description of procedure    After pushing for 4 minutes, at 1236 patient delivered a viable male , wt pending, apgars of 9 (1 min) and 9 (5 min)  The fetal vertex delivered spontaneously  Baby was checked for nuchal  Nuchal cord x1 was palpated and fetus was delivered through  The anterior shoulder delivered atraumatically with maternal expulsive forces and the assistance of gentle downward traction  The posterior shoulder delivered with maternal expulsive forces and the assistance of upward traction   The remainder of the fetus delivered spontaneously  Upon delivery, the infant was placed on the mothers abdomen and the cord was clamped and cut  Delayed cord clamping was performed  The infant was noted to cry spontaneously and was moving all extremities appropriately  There was no evidence for injury  Awaiting nurse resuscitators evaluated the   Arterial and venous cord blood gases and cord blood was collected for analysis  These were promptly sent to the lab  In the immediate post-partum, 30 units of IV pitocin was administered, and the uterus was noted to contract down well with massage and pitocin  The placenta delivered spontaneously at 1240 and was noted to have a centrally inserted 3 vessel cord  The vagina, cervix, perineum, and rectum were inspected and there was noted to be no perineal lacerations  At the conclusion of the procedure, all needle, sponge, and instrument counts were noted to be correct  Patient tolerated the procedure well and was allowed to recover in labor and delivery room with family and  before being transferred to the post-partum floor  The attending was present and participated in all key portions of the case          Brinda Trimble MD  2020  12:46 PM

## 2020-08-09 PROCEDURE — 99024 POSTOP FOLLOW-UP VISIT: CPT | Performed by: OBSTETRICS & GYNECOLOGY

## 2020-08-09 RX ORDER — MEDROXYPROGESTERONE ACETATE 150 MG/ML
150 INJECTION, SUSPENSION INTRAMUSCULAR ONCE
Status: COMPLETED | OUTPATIENT
Start: 2020-08-09 | End: 2020-08-09

## 2020-08-09 RX ADMIN — MEDROXYPROGESTERONE ACETATE 150 MG: 150 INJECTION, SUSPENSION, EXTENDED RELEASE INTRAMUSCULAR at 08:02

## 2020-08-09 RX ADMIN — DOCUSATE SODIUM 100 MG: 100 CAPSULE, LIQUID FILLED ORAL at 18:02

## 2020-08-09 RX ADMIN — DOCUSATE SODIUM 100 MG: 100 CAPSULE, LIQUID FILLED ORAL at 08:01

## 2020-08-09 RX ADMIN — SENNOSIDES 8.6 MG: 8.6 TABLET ORAL at 08:01

## 2020-08-09 NOTE — PROGRESS NOTES
Late entry due to patient care  At 2214 patient requested bottle/non-human milk substitute for infant,"    because he [ the infant] won't eat  He falls asleep on my breast " Patient educated on breastfeeding benefits and feeding on cue, recognizing infant's hunger, as well as signs that baby is getting enough milk  Patient does not request bottle at this time  Parents encouraged to call for assistance, questions, and concerns about breastfeeding

## 2020-08-09 NOTE — LACTATION NOTE
This note was copied from a baby's chart  Met with mom, verbalized breastfeeding is going well  No questions at this time  Enc to call for assistance as needed,phone # given

## 2020-08-09 NOTE — PLAN OF CARE
Problem: PAIN - ADULT  Goal: Verbalizes/displays adequate comfort level or baseline comfort level  Description: Interventions:  - Encourage patient to monitor pain and request assistance  - Assess pain using appropriate pain scale  - Administer analgesics based on type and severity of pain and evaluate response  - Implement non-pharmacological measures as appropriate and evaluate response  - Consider cultural and social influences on pain and pain management  - Notify physician/advanced practitioner if interventions unsuccessful or patient reports new pain  Outcome: Progressing     Problem: INFECTION - ADULT  Goal: Absence or prevention of progression during hospitalization  Description: INTERVENTIONS:  - Assess and monitor for signs and symptoms of infection  - Monitor lab/diagnostic results  - Monitor all insertion sites, i e  indwelling lines, tubes, and drains  - Monitor endotracheal if appropriate and nasal secretions for changes in amount and color  - [place] appropriate cooling/warming therapies per order  - Administer medications as ordered  - Instruct and encourage patient and family to use good hand hygiene technique  - Identify and instruct in appropriate isolation precautions for identified infection/condition  Outcome: Progressing  Goal: Absence of fever/infection during neutropenic period  Description: INTERVENTIONS:  - Monitor WBC    Outcome: Progressing     Problem: SAFETY ADULT  Goal: Patient will remain free of falls  Description: INTERVENTIONS:  - Assess patient frequently for physical needs  -  Identify cognitive and physical deficits and behaviors that affect risk of falls    -  [place] fall precautions as indicated by assessment   - Educate patient/family on patient safety including physical limitations  - Instruct patient to call for assistance with activity based on assessment  - Modify environment to reduce risk of injury  - Consider OT/PT consult to assist with strengthening/mobility  Outcome: Progressing  Goal: Maintain or return to baseline ADL function  Description: INTERVENTIONS:  -  Assess patient's ability to carry out ADLs; assess patient's baseline for ADL function and identify physical deficits which impact ability to perform ADLs (bathing, care of mouth/teeth, toileting, grooming, dressing, etc )  - Assess/evaluate cause of self-care deficits   - Assess range of motion  - Assess patient's mobility; develop plan if impaired  - Assess patient's need for assistive devices and provide as appropriate  - Encourage maximum independence but intervene and supervise when necessary  - Involve family in performance of ADLs  - Assess for home care needs following discharge   - Consider OT consult to assist with ADL evaluation and planning for discharge  - Provide patient education as appropriate  Outcome: Progressing  Goal: Maintain or return mobility status to optimal level  Description: INTERVENTIONS:  - Assess patient's baseline mobility status (ambulation, transfers, stairs, etc )    - Identify cognitive and physical deficits and behaviors that affect mobility  - Identify mobility aids required to assist with transfers and/or ambulation (gait belt, sit-to-stand, lift, walker, cane, etc )  - [place] fall precautions as indicated by assessment  - Record patient progress and toleration of activity level on Mobility SBAR; progress patient to next Phase/Stage  - Instruct patient to call for assistance with activity based on assessment  - Consider rehabilitation consult to assist with strengthening/weightbearing, etc   Outcome: Progressing     Problem: Knowledge Deficit  Goal: Patient/family/caregiver demonstrates understanding of disease process, treatment plan, medications, and discharge instructions  Description: Complete learning assessment and assess knowledge base    Interventions:  - Provide teaching at level of understanding  - Provide teaching via preferred learning methods  Outcome: Progressing     Problem: DISCHARGE PLANNING  Goal: Discharge to home or other facility with appropriate resources  Description: INTERVENTIONS:  - Identify barriers to discharge w/patient and caregiver  - Arrange for needed discharge resources and transportation as appropriate  - Identify discharge learning needs (meds, wound care, etc )  - Arrange for interpretive services to assist at discharge as needed  - Refer to Case Management Department for coordinating discharge planning if the patient needs post-hospital services based on physician/advanced practitioner order or complex needs related to functional status, cognitive ability, or social support system  Outcome: Progressing     Problem: POSTPARTUM  Goal: Experiences normal postpartum course  Description: INTERVENTIONS:  - Monitor maternal vital signs  - Assess uterine involution and lochia  Outcome: Progressing  Goal: Appropriate maternal -  bonding  Description: INTERVENTIONS:  - Identify family support  - Assess for appropriate maternal/infant bonding   -Encourage maternal/infant bonding opportunities  - Referral to  or  as needed  Outcome: Progressing  Goal: Establishment of infant feeding pattern  Description: INTERVENTIONS:  - Assess breast/bottle feeding  - Refer to lactation as needed  Outcome: Progressing

## 2020-08-09 NOTE — PLAN OF CARE
Problem: PAIN - ADULT  Goal: Verbalizes/displays adequate comfort level or baseline comfort level  Description: Interventions:  - Encourage patient to monitor pain and request assistance  - Assess pain using appropriate pain scale  - Administer analgesics based on type and severity of pain and evaluate response  - Implement non-pharmacological measures as appropriate and evaluate response  - Consider cultural and social influences on pain and pain management  - Notify physician/advanced practitioner if interventions unsuccessful or patient reports new pain  Outcome: Progressing     Problem: INFECTION - ADULT  Goal: Absence or prevention of progression during hospitalization  Description: INTERVENTIONS:  - Assess and monitor for signs and symptoms of infection  - Monitor lab/diagnostic results  - Monitor all insertion sites, i e  indwelling lines, tubes, and drains  - Monitor endotracheal if appropriate and nasal secretions for changes in amount and color  - [place] appropriate cooling/warming therapies per order  - Administer medications as ordered  - Instruct and encourage patient and family to use good hand hygiene technique  - Identify and instruct in appropriate isolation precautions for identified infection/condition  Outcome: Progressing  Goal: Absence of fever/infection during neutropenic period  Description: INTERVENTIONS:  - Monitor WBC    Outcome: Progressing     Problem: SAFETY ADULT  Goal: Patient will remain free of falls  Description: INTERVENTIONS:  - Assess patient frequently for physical needs  -  Identify cognitive and physical deficits and behaviors that affect risk of falls    -  [place] fall precautions as indicated by assessment   - Educate patient/family on patient safety including physical limitations  - Instruct patient to call for assistance with activity based on assessment  - Modify environment to reduce risk of injury  - Consider OT/PT consult to assist with strengthening/mobility  Outcome: Progressing  Goal: Maintain or return to baseline ADL function  Description: INTERVENTIONS:  -  Assess patient's ability to carry out ADLs; assess patient's baseline for ADL function and identify physical deficits which impact ability to perform ADLs (bathing, care of mouth/teeth, toileting, grooming, dressing, etc )  - Assess/evaluate cause of self-care deficits   - Assess range of motion  - Assess patient's mobility; develop plan if impaired  - Assess patient's need for assistive devices and provide as appropriate  - Encourage maximum independence but intervene and supervise when necessary  - Involve family in performance of ADLs  - Assess for home care needs following discharge   - Consider OT consult to assist with ADL evaluation and planning for discharge  - Provide patient education as appropriate  Outcome: Progressing  Goal: Maintain or return mobility status to optimal level  Description: INTERVENTIONS:  - Assess patient's baseline mobility status (ambulation, transfers, stairs, etc )    - Identify cognitive and physical deficits and behaviors that affect mobility  - Identify mobility aids required to assist with transfers and/or ambulation (gait belt, sit-to-stand, lift, walker, cane, etc )  - [place] fall precautions as indicated by assessment  - Record patient progress and toleration of activity level on Mobility SBAR; progress patient to next Phase/Stage  - Instruct patient to call for assistance with activity based on assessment  - Consider rehabilitation consult to assist with strengthening/weightbearing, etc   Outcome: Progressing     Problem: Knowledge Deficit  Goal: Patient/family/caregiver demonstrates understanding of disease process, treatment plan, medications, and discharge instructions  Description: Complete learning assessment and assess knowledge base    Interventions:  - Provide teaching at level of understanding  - Provide teaching via preferred learning methods  Outcome: Progressing     Problem: DISCHARGE PLANNING  Goal: Discharge to home or other facility with appropriate resources  Description: INTERVENTIONS:  - Identify barriers to discharge w/patient and caregiver  - Arrange for needed discharge resources and transportation as appropriate  - Identify discharge learning needs (meds, wound care, etc )  - Arrange for interpretive services to assist at discharge as needed  - Refer to Case Management Department for coordinating discharge planning if the patient needs post-hospital services based on physician/advanced practitioner order or complex needs related to functional status, cognitive ability, or social support system  Outcome: Progressing     Problem: POSTPARTUM  Goal: Experiences normal postpartum course  Description: INTERVENTIONS:  - Monitor maternal vital signs  - Assess uterine involution and lochia  Outcome: Progressing  Goal: Appropriate maternal -  bonding  Description: INTERVENTIONS:  - Identify family support  - Assess for appropriate maternal/infant bonding   -Encourage maternal/infant bonding opportunities  - Referral to  or  as needed  Outcome: Progressing  Goal: Establishment of infant feeding pattern  Description: INTERVENTIONS:  - Assess breast/bottle feeding  - Refer to lactation as needed  Outcome: Progressing

## 2020-08-09 NOTE — PROGRESS NOTES
Discharge education papers, pamphlets, and "Save Your Life" magnet provided and reviewed with pt  Pt asked appropriate questions and verbalized understanding  No further questions at this time  Pt encouraged to contact staff for assistance with questions or concerns

## 2020-08-09 NOTE — PROGRESS NOTES
Progress Note - OB/GYN   Eufemia Douse 26 y o  female MRN: [id number]  Unit/Bed#: L&D 308-01 Encounter: [id number]    Assessment:  Post partum Day #1 s/p , stable, baby in nursery    Plan:  1) Fetal [place] variant   -Fetal MRI for baby prior to discharge    2) Continue routine post partum care   Encourage ambulation   Encourage breastfeeding   Depo Provera as bridge to IUD   Patient would like early discharge today    Subjective/Objective   Chief Complaint:     Post delivery  Patient is doing well  Lochia WNL  Pain well controlled  Subjective:     Pain: yes, cramping, improved with meds  Tolerating PO: yes  Voiding: yes  Flatus: yes  BM: no  Ambulating: yes  Breastfeeding:  yes  Chest pain: no  Shortness of breath: no  Leg pain: no  Lochia: minimal    Objective:     Vitals: /65 (BP Location: Right arm)   Pulse 64   Temp 97 7 °F (36 5 °C) (Oral)   Resp 16   Ht 5' 10" (1 778 m)   Wt 97 1 kg (214 lb)   LMP 10/29/2019 (Approximate)   Breastfeeding Yes   BMI 30 71 kg/m²       Intake/Output Summary (Last 24 hours) at 2020 0647  Last data filed at 2020 0045  Gross per 24 hour   Intake --   Output 1130 ml   Net -1130 ml       Lab Results   Component Value Date    WBC 13 44 (H) 2020    HGB 10 8 (L) 2020    HCT 35 0 2020    MCV 85 2020     2020       Physical Exam:     Gen: AAOx3, NAD  CV: RRR  Lungs: CTA b/l  Abd: Soft, non-tender, non-distended, no rebound or guarding  Uterine fundus firm and non-tender, 1 cm below the umbilicus     Ext: Non tender    Ankita House MD  2020  6:47 AM

## 2020-08-10 VITALS
TEMPERATURE: 98.2 F | SYSTOLIC BLOOD PRESSURE: 108 MMHG | OXYGEN SATURATION: 97 % | BODY MASS INDEX: 30.64 KG/M2 | HEIGHT: 70 IN | HEART RATE: 64 BPM | DIASTOLIC BLOOD PRESSURE: 65 MMHG | RESPIRATION RATE: 16 BRPM | WEIGHT: 214 LBS

## 2020-08-10 LAB — RPR SER QL: NORMAL

## 2020-08-10 PROCEDURE — 99024 POSTOP FOLLOW-UP VISIT: CPT | Performed by: OBSTETRICS & GYNECOLOGY

## 2020-08-10 RX ORDER — IBUPROFEN 600 MG/1
600 TABLET ORAL EVERY 6 HOURS PRN
Qty: 30 TABLET | Refills: 0 | Status: SHIPPED | OUTPATIENT
Start: 2020-08-10 | End: 2020-08-28

## 2020-08-10 RX ORDER — ACETAMINOPHEN 325 MG/1
650 TABLET ORAL EVERY 4 HOURS PRN
Qty: 30 TABLET | Refills: 0
Start: 2020-08-10 | End: 2020-08-28

## 2020-08-10 RX ADMIN — SENNOSIDES 8.6 MG: 8.6 TABLET ORAL at 08:22

## 2020-08-10 RX ADMIN — DOCUSATE SODIUM 100 MG: 100 CAPSULE, LIQUID FILLED ORAL at 08:22

## 2020-08-10 NOTE — PROGRESS NOTES
Pt requested formula  Pt was educated on the importance of solely putting the baby to the breast to develop a strong breastfeeding relationship with the   Pt declared that she will be giving formula upon discharge and will breastfeed the  in between formula as she wishes

## 2020-08-10 NOTE — PLAN OF CARE
Problem: PAIN - ADULT  Goal: Verbalizes/displays adequate comfort level or baseline comfort level  Description: Interventions:  - Encourage patient to monitor pain and request assistance  - Assess pain using appropriate pain scale  - Administer analgesics based on type and severity of pain and evaluate response  - Implement non-pharmacological measures as appropriate and evaluate response  - Consider cultural and social influences on pain and pain management  - Notify physician/advanced practitioner if interventions unsuccessful or patient reports new pain  Outcome: Progressing     Problem: INFECTION - ADULT  Goal: Absence or prevention of progression during hospitalization  Description: INTERVENTIONS:  - Assess and monitor for signs and symptoms of infection  - Monitor lab/diagnostic results  - Monitor all insertion sites, i e  indwelling lines, tubes, and drains  - Monitor endotracheal if appropriate and nasal secretions for changes in amount and color  - [place] appropriate cooling/warming therapies per order  - Administer medications as ordered  - Instruct and encourage patient and family to use good hand hygiene technique  - Identify and instruct in appropriate isolation precautions for identified infection/condition  Outcome: Progressing  Goal: Absence of fever/infection during neutropenic period  Description: INTERVENTIONS:  - Monitor WBC    Outcome: Progressing     Problem: SAFETY ADULT  Goal: Patient will remain free of falls  Description: INTERVENTIONS:  - Assess patient frequently for physical needs  -  Identify cognitive and physical deficits and behaviors that affect risk of falls    -  [place] fall precautions as indicated by assessment   - Educate patient/family on patient safety including physical limitations  - Instruct patient to call for assistance with activity based on assessment  - Modify environment to reduce risk of injury  - Consider OT/PT consult to assist with strengthening/mobility  Outcome: Progressing  Goal: Maintain or return to baseline ADL function  Description: INTERVENTIONS:  -  Assess patient's ability to carry out ADLs; assess patient's baseline for ADL function and identify physical deficits which impact ability to perform ADLs (bathing, care of mouth/teeth, toileting, grooming, dressing, etc )  - Assess/evaluate cause of self-care deficits   - Assess range of motion  - Assess patient's mobility; develop plan if impaired  - Assess patient's need for assistive devices and provide as appropriate  - Encourage maximum independence but intervene and supervise when necessary  - Involve family in performance of ADLs  - Assess for home care needs following discharge   - Consider OT consult to assist with ADL evaluation and planning for discharge  - Provide patient education as appropriate  Outcome: Progressing  Goal: Maintain or return mobility status to optimal level  Description: INTERVENTIONS:  - Assess patient's baseline mobility status (ambulation, transfers, stairs, etc )    - Identify cognitive and physical deficits and behaviors that affect mobility  - Identify mobility aids required to assist with transfers and/or ambulation (gait belt, sit-to-stand, lift, walker, cane, etc )  - [place] fall precautions as indicated by assessment  - Record patient progress and toleration of activity level on Mobility SBAR; progress patient to next Phase/Stage  - Instruct patient to call for assistance with activity based on assessment  - Consider rehabilitation consult to assist with strengthening/weightbearing, etc   Outcome: Progressing     Problem: Knowledge Deficit  Goal: Patient/family/caregiver demonstrates understanding of disease process, treatment plan, medications, and discharge instructions  Description: Complete learning assessment and assess knowledge base    Interventions:  - Provide teaching at level of understanding  - Provide teaching via preferred learning methods  Outcome: Progressing     Problem: DISCHARGE PLANNING  Goal: Discharge to home or other facility with appropriate resources  Description: INTERVENTIONS:  - Identify barriers to discharge w/patient and caregiver  - Arrange for needed discharge resources and transportation as appropriate  - Identify discharge learning needs (meds, wound care, etc )  - Arrange for interpretive services to assist at discharge as needed  - Refer to Case Management Department for coordinating discharge planning if the patient needs post-hospital services based on physician/advanced practitioner order or complex needs related to functional status, cognitive ability, or social support system  Outcome: Progressing     Problem: POSTPARTUM  Goal: Experiences normal postpartum course  Description: INTERVENTIONS:  - Monitor maternal vital signs  - Assess uterine involution and lochia  Outcome: Progressing  Goal: Appropriate maternal -  bonding  Description: INTERVENTIONS:  - Identify family support  - Assess for appropriate maternal/infant bonding   -Encourage maternal/infant bonding opportunities  - Referral to  or  as needed  Outcome: Progressing  Goal: Establishment of infant feeding pattern  Description: INTERVENTIONS:  - Assess breast/bottle feeding  - Refer to lactation as needed  Outcome: Progressing

## 2020-08-10 NOTE — PLAN OF CARE
Problem: PAIN - ADULT  Goal: Verbalizes/displays adequate comfort level or baseline comfort level  Description: Interventions:  - Encourage patient to monitor pain and request assistance  - Assess pain using appropriate pain scale  - Administer analgesics based on type and severity of pain and evaluate response  - Implement non-pharmacological measures as appropriate and evaluate response  - Consider cultural and social influences on pain and pain management  - Notify physician/advanced practitioner if interventions unsuccessful or patient reports new pain  Outcome: Progressing     Problem: INFECTION - ADULT  Goal: Absence or prevention of progression during hospitalization  Description: INTERVENTIONS:  - Assess and monitor for signs and symptoms of infection  - Monitor lab/diagnostic results  - Monitor all insertion sites, i e  indwelling lines, tubes, and drains  - Monitor endotracheal if appropriate and nasal secretions for changes in amount and color  - [place] appropriate cooling/warming therapies per order  - Administer medications as ordered  - Instruct and encourage patient and family to use good hand hygiene technique  - Identify and instruct in appropriate isolation precautions for identified infection/condition  Outcome: Progressing  Goal: Absence of fever/infection during neutropenic period  Description: INTERVENTIONS:  - Monitor WBC    Outcome: Progressing     Problem: SAFETY ADULT  Goal: Patient will remain free of falls  Description: INTERVENTIONS:  - Assess patient frequently for physical needs  -  Identify cognitive and physical deficits and behaviors that affect risk of falls    -  [place] fall precautions as indicated by assessment   - Educate patient/family on patient safety including physical limitations  - Instruct patient to call for assistance with activity based on assessment  - Modify environment to reduce risk of injury  - Consider OT/PT consult to assist with strengthening/mobility  Outcome: Progressing  Goal: Maintain or return to baseline ADL function  Description: INTERVENTIONS:  -  Assess patient's ability to carry out ADLs; assess patient's baseline for ADL function and identify physical deficits which impact ability to perform ADLs (bathing, care of mouth/teeth, toileting, grooming, dressing, etc )  - Assess/evaluate cause of self-care deficits   - Assess range of motion  - Assess patient's mobility; develop plan if impaired  - Assess patient's need for assistive devices and provide as appropriate  - Encourage maximum independence but intervene and supervise when necessary  - Involve family in performance of ADLs  - Assess for home care needs following discharge   - Consider OT consult to assist with ADL evaluation and planning for discharge  - Provide patient education as appropriate  Outcome: Progressing  Goal: Maintain or return mobility status to optimal level  Description: INTERVENTIONS:  - Assess patient's baseline mobility status (ambulation, transfers, stairs, etc )    - Identify cognitive and physical deficits and behaviors that affect mobility  - Identify mobility aids required to assist with transfers and/or ambulation (gait belt, sit-to-stand, lift, walker, cane, etc )  - [place] fall precautions as indicated by assessment  - Record patient progress and toleration of activity level on Mobility SBAR; progress patient to next Phase/Stage  - Instruct patient to call for assistance with activity based on assessment  - Consider rehabilitation consult to assist with strengthening/weightbearing, etc   Outcome: Progressing     Problem: Knowledge Deficit  Goal: Patient/family/caregiver demonstrates understanding of disease process, treatment plan, medications, and discharge instructions  Description: Complete learning assessment and assess knowledge base    Interventions:  - Provide teaching at level of understanding  - Provide teaching via preferred learning methods  Outcome: Progressing     Problem: DISCHARGE PLANNING  Goal: Discharge to home or other facility with appropriate resources  Description: INTERVENTIONS:  - Identify barriers to discharge w/patient and caregiver  - Arrange for needed discharge resources and transportation as appropriate  - Identify discharge learning needs (meds, wound care, etc )  - Arrange for interpretive services to assist at discharge as needed  - Refer to Case Management Department for coordinating discharge planning if the patient needs post-hospital services based on physician/advanced practitioner order or complex needs related to functional status, cognitive ability, or social support system  Outcome: Progressing     Problem: POSTPARTUM  Goal: Experiences normal postpartum course  Description: INTERVENTIONS:  - Monitor maternal vital signs  - Assess uterine involution and lochia  Outcome: Progressing  Goal: Appropriate maternal -  bonding  Description: INTERVENTIONS:  - Identify family support  - Assess for appropriate maternal/infant bonding   -Encourage maternal/infant bonding opportunities  - Referral to  or  as needed  Outcome: Progressing  Goal: Establishment of infant feeding pattern  Description: INTERVENTIONS:  - Assess breast/bottle feeding  - Refer to lactation as needed  Outcome: Progressing

## 2020-08-10 NOTE — SOCIAL WORK
Consult received [facility]     MOB is Lashell Temple  FOB is [facility] is [address]    Parents live together with their two other children  MOB reports biggest support is FOB  They have all necessary items for the infant at discharge, including a carseat and crib for safe sleep  MOB is breastfeeding and still has her pump at home  MOB has [facility]  and MA services  MOB and FOB both working in a [employer], no loss of employment during [place]  Parents drive  Plan to use [employer] for ped needs at discharge  [facility] provided through [facility] - she reports no barriers to late care, was consistant with care when she found out she was pregnant, had no first trimester symptoms, did not know she was pregnant initially  No MH concerns  NO D&A use  No legal history  No CYS for [facility] involvement    No concerns identified

## 2020-08-10 NOTE — PLAN OF CARE
Problem: PAIN - ADULT  Goal: Verbalizes/displays adequate comfort level or baseline comfort level  Description: Interventions:  - Encourage patient to monitor pain and request assistance  - Assess pain using appropriate pain scale  - Administer analgesics based on type and severity of pain and evaluate response  - Implement non-pharmacological measures as appropriate and evaluate response  - Consider cultural and social influences on pain and pain management  - Notify physician/advanced practitioner if interventions unsuccessful or patient reports new pain  8/10/2020 1225 by Brian Oliveira RN  Outcome: Completed  8/10/2020 0836 by Brian Oliveira RN  Outcome: Progressing     Problem: INFECTION - ADULT  Goal: Absence or prevention of progression during hospitalization  Description: INTERVENTIONS:  - Assess and monitor for signs and symptoms of infection  - Monitor lab/diagnostic results  - Monitor all insertion sites, i e  indwelling lines, tubes, and drains  - Monitor endotracheal if appropriate and nasal secretions for changes in amount and color  - [place] appropriate cooling/warming therapies per order  - Administer medications as ordered  - Instruct and encourage patient and family to use good hand hygiene technique  - Identify and instruct in appropriate isolation precautions for identified infection/condition  8/10/2020 1225 by Brian Oliveira RN  Outcome: Completed  8/10/2020 0836 by Brian Oliveira RN  Outcome: Progressing  Goal: Absence of fever/infection during neutropenic period  Description: INTERVENTIONS:  - Monitor WBC    8/10/2020 1225 by Brian Oliveira RN  Outcome: Completed  8/10/2020 0836 by Brian Oliveira RN  Outcome: Progressing     Problem: SAFETY ADULT  Goal: Patient will remain free of falls  Description: INTERVENTIONS:  - Assess patient frequently for physical needs  -  Identify cognitive and physical deficits and behaviors that affect risk of falls    -  [place] fall precautions as indicated by assessment   - Educate patient/family on patient safety including physical limitations  - Instruct patient to call for assistance with activity based on assessment  - Modify environment to reduce risk of injury  - Consider OT/PT consult to assist with strengthening/mobility  8/10/2020 1225 by Tae Oliva RN  Outcome: Completed  8/10/2020 0836 by Tae Oliva RN  Outcome: Progressing  Goal: Maintain or return to baseline ADL function  Description: INTERVENTIONS:  -  Assess patient's ability to carry out ADLs; assess patient's baseline for ADL function and identify physical deficits which impact ability to perform ADLs (bathing, care of mouth/teeth, toileting, grooming, dressing, etc )  - Assess/evaluate cause of self-care deficits   - Assess range of motion  - Assess patient's mobility; develop plan if impaired  - Assess patient's need for assistive devices and provide as appropriate  - Encourage maximum independence but intervene and supervise when necessary  - Involve family in performance of ADLs  - Assess for home care needs following discharge   - Consider OT consult to assist with ADL evaluation and planning for discharge  - Provide patient education as appropriate  8/10/2020 1225 by Tae Oliva RN  Outcome: Completed  8/10/2020 0836 by Tae Oliva RN  Outcome: Progressing  Goal: Maintain or return mobility status to optimal level  Description: INTERVENTIONS:  - Assess patient's baseline mobility status (ambulation, transfers, stairs, etc )    - Identify cognitive and physical deficits and behaviors that affect mobility  - Identify mobility aids required to assist with transfers and/or ambulation (gait belt, sit-to-stand, lift, walker, cane, etc )  - [place] fall precautions as indicated by assessment  - Record patient progress and toleration of activity level on Mobility SBAR; progress patient to next Phase/Stage  - Instruct patient to call for assistance with activity based on assessment  - Consider rehabilitation consult to assist with strengthening/weightbearing, etc   8/10/2020 1225 by Gregory Merrill RN  Outcome: Completed  8/10/2020 0836 by Gregory Merrill RN  Outcome: Progressing     Problem: Knowledge Deficit  Goal: Patient/family/caregiver demonstrates understanding of disease process, treatment plan, medications, and discharge instructions  Description: Complete learning assessment and assess knowledge base    Interventions:  - Provide teaching at level of understanding  - Provide teaching via preferred learning methods  8/10/2020 1225 by Gregory Merrill RN  Outcome: Completed  8/10/2020 0836 by Gregory Merrill RN  Outcome: Progressing     Problem: DISCHARGE PLANNING  Goal: Discharge to home or other facility with appropriate resources  Description: INTERVENTIONS:  - Identify barriers to discharge w/patient and caregiver  - Arrange for needed discharge resources and transportation as appropriate  - Identify discharge learning needs (meds, wound care, etc )  - Arrange for interpretive services to assist at discharge as needed  - Refer to Case Management Department for coordinating discharge planning if the patient needs post-hospital services based on physician/advanced practitioner order or complex needs related to functional status, cognitive ability, or social support system  8/10/2020 1225 by Gregory Merrill RN  Outcome: Completed  8/10/2020 0836 by Gregory Merrill RN  Outcome: Progressing     Problem: POSTPARTUM  Goal: Experiences normal postpartum course  Description: INTERVENTIONS:  - Monitor maternal vital signs  - Assess uterine involution and lochia  8/10/2020 1225 by Gregory Merrill RN  Outcome: Completed  8/10/2020 0836 by Gregory Merrill RN  Outcome: Progressing  Goal: Appropriate maternal -  bonding  Description: INTERVENTIONS:  - Identify family support  - Assess for appropriate maternal/infant bonding   -Encourage maternal/infant bonding opportunities  - Referral to  or  as needed  8/10/2020 1225 by Meg Cronin RN  Outcome: Completed  8/10/2020 0836 by Meg Cronin RN  Outcome: Progressing  Goal: Establishment of infant feeding pattern  Description: INTERVENTIONS:  - Assess breast/bottle feeding  - Refer to lactation as needed  8/10/2020 1225 by Meg Cronin RN  Outcome: Completed  8/10/2020 0836 by Meg Cronin RN  Outcome: Progressing

## 2020-08-10 NOTE — PROGRESS NOTES
Progress Note - OB/GYN   Kayce Holcomb 26 y o  female MRN: [id number]  Unit/Bed#: L&D 308-01 Encounter: [id number]      Assessment:  Post partum day #2 s/p , stable, and doing well  Patient is doing well this morning and is ready to go home  Her baby needs one more set of blood work, per mom    Plan:    Continue routine post partum care   - Patient is ambulating, encouraged to continue    - Encourage breastfeeding    Continue current meds    - See list below   - Pain well controlled on PO analgesia    Future contraception   - Pt signed an MA-31 (scanned into our system) and received her depo shot as a bridge      Disposition   - Anticipate discharge home today, pending baby's blood work results      Subjective/Objective       Subjective:   Pain: no  Tolerating Oral Intake: yes  Voiding: yes  Flatus: yes  Bowel Movement: no  Ambulating: yes  Breastfeeding: Breastfeeding  Chest Pain: no  Shortness of Breath: no  Leg Pain/Discomfort: no    Objective:   Vitals:   /68 (BP Location: Right arm)   Pulse 70   Temp 97 9 °F (36 6 °C) (Oral)   Resp 16   Ht 5' 10" (1 778 m)   Wt 97 1 kg (214 lb)   LMP 10/29/2019 (Approximate)   SpO2 97%   Breastfeeding Yes   BMI 30 71 kg/m²   Body mass index is 30 71 kg/m²    I/O        07 -  0700  07 - 08/10 0700    Urine (mL/kg/hr) 1050 (0 5)     Blood 80     Total Output 1130     Net -1130           Unmeasured Urine Occurrence 1 x         Lab Results   Component Value Date    WBC 13 44 (H) 2020    HGB 10 8 (L) 2020    HCT 35 0 2020    MCV 85 2020     2020       Meds/Allergies   Current Facility-Administered Medications   Medication Dose Route Frequency    acetaminophen (TYLENOL) tablet 650 mg  650 mg Oral Q4H PRN    aluminum-magnesium hydroxide-simethicone (MYLANTA) 200-200-20 mg/5 mL oral suspension 15 mL  15 mL Oral Q6H PRN    benzocaine-menthol-lanolin-aloe (DERMOPLAST) 20-0 5 % topical spray Topical 4x Daily PRN    calcium carbonate (TUMS) chewable tablet 1,000 mg  1,000 mg Oral Daily PRN    docusate sodium (COLACE) capsule 100 mg  100 mg Oral BID    hydrocortisone 1 % cream 1 application  1 application Topical 4x Daily PRN    ibuprofen (MOTRIN) tablet 600 mg  600 mg Oral Q6H PRN    oxyCODONE-acetaminophen (PERCOCET) 5-325 mg per tablet 1 tablet  1 tablet Oral Q4H PRN    oxyCODONE-acetaminophen (PERCOCET) 5-325 mg per tablet 2 tablet  2 tablet Oral Q4H PRN    senna (SENOKOT) tablet 8 6 mg  1 tablet Oral Daily    simethicone (MYLICON) chewable tablet 80 mg  80 mg Oral 4x Daily PRN    witch hazel-glycerin (TUCKS) topical pad 1 pad  1 pad Topical Q2H PRN       Physical Exam:  General: in no apparent distress  Cardiovascular: tachy, no m/r/g  Lungs: clear to auscultation bilaterally  Fundus: Firm, 1 cm below the umbilicus    Deo Hollis MD  PGY-1 OB/GYN   8/10/2020 6:18 AM

## 2020-08-13 ENCOUNTER — TELEPHONE (OUTPATIENT)
Dept: OBGYN CLINIC | Facility: CLINIC | Age: 25
End: 2020-08-13

## 2020-08-16 LAB — PLACENTA IN STORAGE: NORMAL

## 2020-08-18 ENCOUNTER — TELEPHONE (OUTPATIENT)
Dept: OBGYN CLINIC | Facility: CLINIC | Age: 25
End: 2020-08-18

## 2020-08-18 NOTE — TELEPHONE ENCOUNTER
Transition of Care Post Partum phone call:     Date of delivery: 2020  Weeks gestation: full term 38w2d weeks  Type of delivery: vaginal delivery  Sex of child: male    Birth weight: 3459 gm    How are you feeling: Patient reports feeling well,  Depo given at hospital   Vaginal bleeding status: light  Urinary status: none  Bowel movement: Yes  Pain control: none   feeding: Breastfeeding   Any baby blues: No  Scheduled for follow-up appointment: 2020

## 2020-08-27 ENCOUNTER — TELEPHONE (OUTPATIENT)
Dept: OBGYN CLINIC | Facility: CLINIC | Age: 25
End: 2020-08-27

## 2020-08-28 ENCOUNTER — TELEPHONE (OUTPATIENT)
Dept: OBGYN CLINIC | Facility: CLINIC | Age: 25
End: 2020-08-28

## 2020-08-28 ENCOUNTER — POSTPARTUM VISIT (OUTPATIENT)
Dept: OBGYN CLINIC | Facility: CLINIC | Age: 25
End: 2020-08-28

## 2020-08-28 VITALS
TEMPERATURE: 98.5 F | SYSTOLIC BLOOD PRESSURE: 114 MMHG | WEIGHT: 197.4 LBS | HEART RATE: 71 BPM | BODY MASS INDEX: 28.32 KG/M2 | DIASTOLIC BLOOD PRESSURE: 72 MMHG

## 2020-08-28 DIAGNOSIS — Z30.2 REQUEST FOR STERILIZATION: ICD-10-CM

## 2020-08-28 PROBLEM — O99.019 ANEMIA IN PREGNANCY: Status: RESOLVED | Noted: 2020-07-02 | Resolved: 2020-08-28

## 2020-08-28 PROBLEM — O99.810 ABNORMAL GLUCOSE AFFECTING PREGNANCY: Status: RESOLVED | Noted: 2020-05-21 | Resolved: 2020-08-28

## 2020-08-28 PROBLEM — O09.899 SHORT INTERVAL BETWEEN PREGNANCIES AFFECTING PREGNANCY, ANTEPARTUM: Status: RESOLVED | Noted: 2020-05-21 | Resolved: 2020-08-28

## 2020-08-28 PROBLEM — O35.0XX0: Status: RESOLVED | Noted: 2020-07-23 | Resolved: 2020-08-28

## 2020-08-28 PROBLEM — Z3A.37 37 WEEKS GESTATION OF PREGNANCY: Status: RESOLVED | Noted: 2020-08-07 | Resolved: 2020-08-28

## 2020-08-28 PROBLEM — Z36.9 ENCOUNTER FOR FETAL ULTRASOUND: Status: RESOLVED | Noted: 2020-05-21 | Resolved: 2020-08-28

## 2020-08-28 PROBLEM — Z13.79 GENETIC SCREENING: Status: RESOLVED | Noted: 2020-05-21 | Resolved: 2020-08-28

## 2020-08-28 PROCEDURE — 99213 OFFICE O/P EST LOW 20 MIN: CPT | Performed by: NURSE PRACTITIONER

## 2020-08-28 NOTE — PROGRESS NOTES
POSTPARTUM VISIT    Dina Vargas presents today for postpartum visit  She had a vaginal delivery on 8/8/2020  Complications included none  She is breast and bottlefeeding her infant and reports no issues with such  She desires surgical sterilization for contraception and had injection of Depoprovera on 8/9/2020 prior to hospital discharge as bridge until surgery can be scheduled  She was provided with [place] Depression Screening tool and her score was 0  Review of Systems:   -Constitutional: denies issues, denies pain   -Breasts: denies tenderness   -Gynecologic: lochia continues - light flow   -Urinary: denies issues urinating   -GI: stools WNL, denies issues    Physical Exam:   -Vitals:   Vitals:    08/28/20 1109   BP: 114/72   Pulse: 71   Temp: 98 5 °F (36 9 °C)   Weight: 89 5 kg (197 lb 6 4 oz)      -General: A&Ox3, no acute distress note   -Abdomen: soft, non-tender   -Extremities: nontender, no edema noted   -Breasts: deferred   -Pelvic exam: deferred    Assessment/Plan:  1  Normal postpartum exam   2  Depression screening negative  3  Last pap smear was done 1/23/2019 and result was NILM  Advise return for next annual GYN exam in 1/2020   4  Contraception: Dr Danelle Kenyon in to see patient to discuss and consent patient for surgical sterilization with bilateral salpingectomy  MA31 consent form already signed on 6/18/20  Advised patient she will be contacted to schedule procedure

## 2020-09-24 ENCOUNTER — TELEPHONE (OUTPATIENT)
Dept: OBGYN CLINIC | Facility: CLINIC | Age: 25
End: 2020-09-24

## 2020-09-24 NOTE — TELEPHONE ENCOUNTER
Informed patient of her surgery date 10/28/20 and H&P 10/13/20  Auth not required  Consent scanned, Ma sterilization form faxed

## 2020-09-24 NOTE — LETTER
Zulma Capps  1995      To whom it may concern,   Please be advised that Zulma Capps has been under our care for purposes of her pregnancy, delivery, and subsequent postpartum care  She is cleared to return to work on 9/25/2020 with no activity restrictions  Please contact our office with any questions or concerns      AMA Harris  9/24/2020

## 2020-10-12 ENCOUNTER — TELEPHONE (OUTPATIENT)
Dept: OBGYN CLINIC | Facility: CLINIC | Age: 25
End: 2020-10-12

## 2020-10-13 ENCOUNTER — OFFICE VISIT (OUTPATIENT)
Dept: OBGYN CLINIC | Facility: CLINIC | Age: 25
End: 2020-10-13

## 2020-10-13 VITALS
TEMPERATURE: 97.8 F | HEIGHT: 70 IN | DIASTOLIC BLOOD PRESSURE: 75 MMHG | HEART RATE: 68 BPM | SYSTOLIC BLOOD PRESSURE: 132 MMHG | BODY MASS INDEX: 28.2 KG/M2 | WEIGHT: 197 LBS

## 2020-10-13 DIAGNOSIS — Z30.2 ENCOUNTER FOR STERILIZATION: Primary | ICD-10-CM

## 2020-10-13 PROCEDURE — 99213 OFFICE O/P EST LOW 20 MIN: CPT | Performed by: OBSTETRICS & GYNECOLOGY

## 2020-10-27 ENCOUNTER — ANESTHESIA EVENT (OUTPATIENT)
Dept: PERIOP | Facility: HOSPITAL | Age: 25
End: 2020-10-27
Payer: COMMERCIAL

## 2020-10-28 ENCOUNTER — HOSPITAL ENCOUNTER (OUTPATIENT)
Facility: HOSPITAL | Age: 25
Setting detail: OUTPATIENT SURGERY
Discharge: HOME | End: 2020-10-28
Attending: OBSTETRICS & GYNECOLOGY | Admitting: OBSTETRICS & GYNECOLOGY
Payer: COMMERCIAL

## 2020-10-28 ENCOUNTER — ANESTHESIA (OUTPATIENT)
Dept: PERIOP | Facility: HOSPITAL | Age: 25
End: 2020-10-28
Payer: COMMERCIAL

## 2020-10-28 VITALS
RESPIRATION RATE: 16 BRPM | BODY MASS INDEX: 25.77 KG/M2 | OXYGEN SATURATION: 100 % | TEMPERATURE: 97.8 F | HEIGHT: 70 IN | HEART RATE: 76 BPM | WEIGHT: 180 LBS | DIASTOLIC BLOOD PRESSURE: 66 MMHG | SYSTOLIC BLOOD PRESSURE: 128 MMHG

## 2020-10-28 VITALS — HEART RATE: 85 BPM

## 2020-10-28 DIAGNOSIS — Z90.79 STATUS POST BILATERAL SALPINGECTOMY: Primary | ICD-10-CM

## 2020-10-28 DIAGNOSIS — Z64.1 MULTIPARITY: ICD-10-CM

## 2020-10-28 LAB
EXT PREGNANCY TEST URINE: NEGATIVE
EXT. CONTROL: NORMAL

## 2020-10-28 PROCEDURE — 88302 TISSUE EXAM BY PATHOLOGIST: CPT | Performed by: PATHOLOGY

## 2020-10-28 PROCEDURE — 58661 LAPAROSCOPY REMOVE ADNEXA: CPT | Performed by: OBSTETRICS & GYNECOLOGY

## 2020-10-28 PROCEDURE — 81025 URINE PREGNANCY TEST: CPT | Performed by: OBSTETRICS & GYNECOLOGY

## 2020-10-28 RX ORDER — PROPOFOL 10 MG/ML
INJECTION, EMULSION INTRAVENOUS AS NEEDED
Status: DISCONTINUED | OUTPATIENT
Start: 2020-10-28 | End: 2020-10-28

## 2020-10-28 RX ORDER — IBUPROFEN 600 MG/1
600 TABLET ORAL EVERY 6 HOURS PRN
Status: DISCONTINUED | OUTPATIENT
Start: 2020-10-28 | End: 2020-10-28 | Stop reason: HOSPADM

## 2020-10-28 RX ORDER — ONDANSETRON 2 MG/ML
INJECTION INTRAMUSCULAR; INTRAVENOUS AS NEEDED
Status: DISCONTINUED | OUTPATIENT
Start: 2020-10-28 | End: 2020-10-28

## 2020-10-28 RX ORDER — OXYCODONE HYDROCHLORIDE 5 MG/1
5 TABLET ORAL EVERY 4 HOURS PRN
Qty: 10 TABLET | Refills: 0 | Status: SHIPPED | OUTPATIENT
Start: 2020-10-28 | End: 2020-11-07

## 2020-10-28 RX ORDER — NEOSTIGMINE METHYLSULFATE 1 MG/ML
INJECTION INTRAVENOUS AS NEEDED
Status: DISCONTINUED | OUTPATIENT
Start: 2020-10-28 | End: 2020-10-28

## 2020-10-28 RX ORDER — MIDAZOLAM HYDROCHLORIDE 2 MG/2ML
INJECTION, SOLUTION INTRAMUSCULAR; INTRAVENOUS AS NEEDED
Status: DISCONTINUED | OUTPATIENT
Start: 2020-10-28 | End: 2020-10-28

## 2020-10-28 RX ORDER — KETOROLAC TROMETHAMINE 30 MG/ML
INJECTION, SOLUTION INTRAMUSCULAR; INTRAVENOUS AS NEEDED
Status: DISCONTINUED | OUTPATIENT
Start: 2020-10-28 | End: 2020-10-28

## 2020-10-28 RX ORDER — GLYCOPYRROLATE 0.2 MG/ML
INJECTION INTRAMUSCULAR; INTRAVENOUS AS NEEDED
Status: DISCONTINUED | OUTPATIENT
Start: 2020-10-28 | End: 2020-10-28

## 2020-10-28 RX ORDER — ONDANSETRON 2 MG/ML
4 INJECTION INTRAMUSCULAR; INTRAVENOUS ONCE AS NEEDED
Status: COMPLETED | OUTPATIENT
Start: 2020-10-28 | End: 2020-10-28

## 2020-10-28 RX ORDER — ACETAMINOPHEN 325 MG/1
650 TABLET ORAL EVERY 6 HOURS PRN
Status: DISCONTINUED | OUTPATIENT
Start: 2020-10-28 | End: 2020-10-28 | Stop reason: HOSPADM

## 2020-10-28 RX ORDER — DEXAMETHASONE SODIUM PHOSPHATE 4 MG/ML
INJECTION, SOLUTION INTRA-ARTICULAR; INTRALESIONAL; INTRAMUSCULAR; INTRAVENOUS; SOFT TISSUE AS NEEDED
Status: DISCONTINUED | OUTPATIENT
Start: 2020-10-28 | End: 2020-10-28

## 2020-10-28 RX ORDER — BUPIVACAINE HYDROCHLORIDE 5 MG/ML
INJECTION, SOLUTION EPIDURAL; INTRACAUDAL AS NEEDED
Status: DISCONTINUED | OUTPATIENT
Start: 2020-10-28 | End: 2020-10-28 | Stop reason: HOSPADM

## 2020-10-28 RX ORDER — SODIUM CHLORIDE 9 MG/ML
125 INJECTION, SOLUTION INTRAVENOUS CONTINUOUS
Status: DISCONTINUED | OUTPATIENT
Start: 2020-10-28 | End: 2020-10-28 | Stop reason: HOSPADM

## 2020-10-28 RX ORDER — OXYCODONE HYDROCHLORIDE 5 MG/1
5 TABLET ORAL EVERY 4 HOURS PRN
Status: DISCONTINUED | OUTPATIENT
Start: 2020-10-28 | End: 2020-10-28 | Stop reason: HOSPADM

## 2020-10-28 RX ORDER — FENTANYL CITRATE/PF 50 MCG/ML
50 SYRINGE (ML) INJECTION
Status: DISCONTINUED | OUTPATIENT
Start: 2020-10-28 | End: 2020-10-28 | Stop reason: HOSPADM

## 2020-10-28 RX ORDER — ROCURONIUM BROMIDE 10 MG/ML
INJECTION, SOLUTION INTRAVENOUS AS NEEDED
Status: DISCONTINUED | OUTPATIENT
Start: 2020-10-28 | End: 2020-10-28

## 2020-10-28 RX ORDER — FENTANYL CITRATE 50 UG/ML
INJECTION, SOLUTION INTRAMUSCULAR; INTRAVENOUS AS NEEDED
Status: DISCONTINUED | OUTPATIENT
Start: 2020-10-28 | End: 2020-10-28

## 2020-10-28 RX ADMIN — FENTANYL CITRATE 50 MCG: 50 INJECTION, SOLUTION INTRAMUSCULAR; INTRAVENOUS at 14:03

## 2020-10-28 RX ADMIN — ONDANSETRON 4 MG: 2 INJECTION INTRAMUSCULAR; INTRAVENOUS at 14:07

## 2020-10-28 RX ADMIN — SODIUM CHLORIDE 125 ML/HR: 0.9 INJECTION, SOLUTION INTRAVENOUS at 12:41

## 2020-10-28 RX ADMIN — PROPOFOL 200 MG: 10 INJECTION, EMULSION INTRAVENOUS at 14:03

## 2020-10-28 RX ADMIN — FENTANYL CITRATE 50 MCG: 50 INJECTION, SOLUTION INTRAMUSCULAR; INTRAVENOUS at 14:20

## 2020-10-28 RX ADMIN — KETOROLAC TROMETHAMINE 30 MG: 30 INJECTION, SOLUTION INTRAMUSCULAR at 14:41

## 2020-10-28 RX ADMIN — SODIUM CHLORIDE 125 ML/HR: 0.9 INJECTION, SOLUTION INTRAVENOUS at 15:10

## 2020-10-28 RX ADMIN — NEOSTIGMINE METHYLSULFATE 3 MG: 1 INJECTION, SOLUTION INTRAVENOUS at 14:45

## 2020-10-28 RX ADMIN — DEXAMETHASONE SODIUM PHOSPHATE 4 MG: 4 INJECTION, SOLUTION INTRAMUSCULAR; INTRAVENOUS at 14:07

## 2020-10-28 RX ADMIN — ROCURONIUM BROMIDE 40 MG: 10 INJECTION, SOLUTION INTRAVENOUS at 14:03

## 2020-10-28 RX ADMIN — MIDAZOLAM 2 MG: 1 INJECTION INTRAMUSCULAR; INTRAVENOUS at 13:56

## 2020-10-28 RX ADMIN — LIDOCAINE HYDROCHLORIDE 50 MG: 20 INJECTION, SOLUTION INTRAVENOUS at 14:03

## 2020-10-28 RX ADMIN — GLYCOPYRROLATE 0.4 MG: 0.2 INJECTION, SOLUTION INTRAMUSCULAR; INTRAVENOUS at 14:45

## 2020-10-28 RX ADMIN — ONDANSETRON 4 MG: 2 INJECTION INTRAMUSCULAR; INTRAVENOUS at 15:15

## 2020-11-09 ENCOUNTER — TELEPHONE (OUTPATIENT)
Dept: OBGYN CLINIC | Facility: CLINIC | Age: 25
End: 2020-11-09

## 2020-11-10 ENCOUNTER — OFFICE VISIT (OUTPATIENT)
Dept: OBGYN CLINIC | Facility: CLINIC | Age: 25
End: 2020-11-10

## 2020-11-10 VITALS
TEMPERATURE: 97.6 F | WEIGHT: 198 LBS | BODY MASS INDEX: 28.41 KG/M2 | SYSTOLIC BLOOD PRESSURE: 140 MMHG | DIASTOLIC BLOOD PRESSURE: 79 MMHG | HEART RATE: 88 BPM

## 2020-11-10 DIAGNOSIS — R11.0 NAUSEA: Primary | ICD-10-CM

## 2020-11-10 PROCEDURE — 99213 OFFICE O/P EST LOW 20 MIN: CPT | Performed by: OBSTETRICS & GYNECOLOGY

## 2020-11-10 RX ORDER — ONDANSETRON 4 MG/1
4 TABLET, ORALLY DISINTEGRATING ORAL EVERY 6 HOURS PRN
Qty: 20 TABLET | Refills: 0 | Status: SHIPPED | OUTPATIENT
Start: 2020-11-10 | End: 2021-01-08

## 2020-11-24 ENCOUNTER — OFFICE VISIT (OUTPATIENT)
Dept: OBGYN CLINIC | Facility: CLINIC | Age: 25
End: 2020-11-24

## 2020-11-24 VITALS
DIASTOLIC BLOOD PRESSURE: 73 MMHG | HEART RATE: 72 BPM | SYSTOLIC BLOOD PRESSURE: 110 MMHG | BODY MASS INDEX: 28.27 KG/M2 | WEIGHT: 197 LBS

## 2020-11-24 DIAGNOSIS — Z90.79 STATUS POST BILATERAL SALPINGECTOMY: Primary | ICD-10-CM

## 2020-11-24 PROCEDURE — 99213 OFFICE O/P EST LOW 20 MIN: CPT | Performed by: OBSTETRICS & GYNECOLOGY

## 2021-01-07 ENCOUNTER — TELEPHONE (OUTPATIENT)
Dept: OBGYN CLINIC | Facility: CLINIC | Age: 26
End: 2021-01-07

## 2021-01-08 ENCOUNTER — ANNUAL EXAM (OUTPATIENT)
Dept: OBGYN CLINIC | Facility: CLINIC | Age: 26
End: 2021-01-08

## 2021-01-08 VITALS
WEIGHT: 197 LBS | HEART RATE: 76 BPM | DIASTOLIC BLOOD PRESSURE: 81 MMHG | SYSTOLIC BLOOD PRESSURE: 116 MMHG | BODY MASS INDEX: 28.2 KG/M2 | HEIGHT: 70 IN

## 2021-01-08 DIAGNOSIS — Z12.39 ENCOUNTER FOR BREAST CANCER SCREENING USING NON-MAMMOGRAM MODALITY: ICD-10-CM

## 2021-01-08 DIAGNOSIS — Z12.4 SCREENING FOR CERVICAL CANCER: ICD-10-CM

## 2021-01-08 DIAGNOSIS — Z01.419 ENCOUNTER FOR GYNECOLOGICAL EXAMINATION WITHOUT ABNORMAL FINDING: Primary | ICD-10-CM

## 2021-01-08 PROBLEM — Z90.79 STATUS POST BILATERAL SALPINGECTOMY: Status: RESOLVED | Noted: 2020-11-24 | Resolved: 2021-01-08

## 2021-01-08 PROBLEM — Z30.9 CONTRACEPTION MANAGEMENT: Status: RESOLVED | Noted: 2020-05-21 | Resolved: 2021-01-08

## 2021-01-08 PROCEDURE — 99395 PREV VISIT EST AGE 18-39: CPT | Performed by: NURSE PRACTITIONER

## 2021-01-08 NOTE — PROGRESS NOTES
ANNUAL GYNECOLOGICAL EXAMINATION    Rosi Kiran is a 26 y o  female who presents today for annual GYN exam   Her last pap smear was performed 2019 and result was NILM  She reports no history of abnormal pap smears in her past   She reports menses as regular  Patient's last menstrual period was 2020 (exact date)  Her contraceptive method is BTL  Her general medical history has been reviewed and she reports it as follows:    Past Medical History:   Diagnosis Date    Migraines      Past Surgical History:   Procedure Laterality Date    MS LAP,RMV  ADNEXAL STRUCTURE Bilateral 10/28/2020    Procedure: LAP SALPINGECTOMY;  Surgeon: Anam Frazier MD;  Location: [facility];  Service: Gynecology     OB History        4    Para   3    Term   3       0    AB   1    Living   3       SAB   1    TAB   0    Ectopic   0    Multiple   0    Live Births   3               Social History     Tobacco Use    Smoking status: Never Smoker    Smokeless tobacco: Never Used   Substance Use Topics    Alcohol use: No    Drug use: Never     Cancer-related family history is negative for Breast cancer and Cancer  Current Outpatient Medications   Medication Instructions    Prenatal Vit-Fe Fumarate-FA (PREPLUS) 27-1 MG TABS TK 1 T PO QD       Review of Systems:  Review of Systems   Constitutional: Negative  Gastrointestinal: Negative  Genitourinary: Negative for difficulty urinating, menstrual problem, pelvic pain and vaginal discharge  Skin: Negative  Physical Exam:  /81   Pulse 76   Ht 5' 10" (1 778 m)   Wt 89 4 kg (197 lb)   LMP 2020 (Exact Date)   BMI 28 27 kg/m²   Physical Exam  Constitutional:       Appearance: She is well-developed  Genitourinary:      Vagina and uterus normal       No lesions in the vagina  No vaginal discharge or rugosity  No cervical motion tenderness, lesion or pinkness  Uterus is not tender        No right or left adnexal mass present  Right adnexa not tender  Left adnexa not tender  Neck:      Musculoskeletal: Neck supple  Thyroid: No thyromegaly  Cardiovascular:      Rate and Rhythm: Normal rate and regular rhythm  Pulmonary:      Effort: Pulmonary effort is normal       Breath sounds: Normal breath sounds  Chest:      Breasts:         Right: No mass, nipple discharge, skin change or tenderness  Left: No mass, nipple discharge, skin change or tenderness  Abdominal:      General: Bowel sounds are normal       Palpations: Abdomen is soft  Neurological:      Mental Status: She is alert and oriented to person, place, and time  Skin:     General: Skin is warm and dry  Assessment/Plan:   1  Normal well-woman GYN exam   2  Cervical cancer screening:  Normal pelvic exam   Pap smear not indicated at this time  3  STD screening:  Patient declines  4  Breast cancer screening:  Normal breast exam   Reviewed breast self-awareness  5  Depression Screening: Patient's depression screening was assessed with a PHQ-2 score of 0  Their PHQ-9 score was 0  Clinically patient does not have depression  No treatment is required  6  BMI Counseling: Body mass index is 28 27 kg/m²  Discussed the patient's BMI with her  The BMI is above normal  Patient referred to PCP due to patient being obese  7  Contraception:  BTL     8  Return to office in 1 year for next annual GYN exam

## 2022-02-10 ENCOUNTER — TELEPHONE (OUTPATIENT)
Dept: OBGYN CLINIC | Facility: CLINIC | Age: 27
End: 2022-02-10

## 2023-05-04 NOTE — TELEPHONE ENCOUNTER
----- Message from Ernestina Gunter MD sent at 5/4/2023  8:40 AM CDT -----  Please call momJessica.  All Gopi's labs are normal, except his HDL (good) cholesterol is low.  This can be raised by having Gopi exercise daily, even walking, and eating healthy fats like avocadoes, nuts, and cooking with olive oil.   Iron and colace ordered for anemia in pregnancy, patient notified

## 2024-10-02 NOTE — PROGRESS NOTES
Depo-Provera     Last Depo date: 6/2018   Side effects: none   HCG: YES, NEGATIVE   Given by: Tawana Martin MA   Site: right deltoid   Annual due: 1/2020  Pt had missed her last depo inj  but reports she has not been sexually active in a month and her upt today was negative   Calcium supplement daily teaching, condoms for 2 weeks following first injection dose  Please notify Eva of her Pap smear results and offer treatment for BV.  The recommendation is to have her repeat her Pap smear in 3 years.  Thanks!   Noemi     Called patient, informed of ASCUS but HPV negative, repeat in 3 years. Sent metrogel per pt's preference. Pt has no questions.

## (undated) DEVICE — BLUE HEAT SCOPE WARMER

## (undated) DEVICE — SCD SEQUENTIAL COMPRESSION COMFORT SLEEVE MEDIUM KNEE LENGTH: Brand: KENDALL SCD

## (undated) DEVICE — GLOVE PI ULTRA TOUCH SZ.7.5

## (undated) DEVICE — DRAPE EQUIPMENT RF WAND

## (undated) DEVICE — TROCAR: Brand: KII® SLEEVE

## (undated) DEVICE — GLOVE INDICATOR PI UNDERGLOVE SZ 7 BLUE

## (undated) DEVICE — BLUNT TIP LAPAROSCOPIC SEALER/DIVIDER NANO-COATED: Brand: LIGASURE

## (undated) DEVICE — TROCAR: Brand: KII FIOS FIRST ENTRY

## (undated) DEVICE — INTENDED FOR TISSUE SEPARATION, AND OTHER PROCEDURES THAT REQUIRE A SHARP SURGICAL BLADE TO PUNCTURE OR CUT.: Brand: BARD-PARKER SAFETY BLADES SIZE 11, STERILE

## (undated) DEVICE — CHLORAPREP HI-LITE 26ML ORANGE

## (undated) DEVICE — PREMIUM DRY TRAY LF: Brand: MEDLINE INDUSTRIES, INC.

## (undated) DEVICE — BETHLEHEM UNIVERSAL GYN LAP PK: Brand: CARDINAL HEALTH

## (undated) DEVICE — SUT MONOCRYL 4-0 PS-2 27 IN Y426H

## (undated) DEVICE — ENDOPATH 5MM CURVED SCISSORS WITH MONOPOLAR CAUTERY: Brand: ENDOPATH

## (undated) DEVICE — [HIGH FLOW INSUFFLATOR,  DO NOT USE IF PACKAGE IS DAMAGED,  KEEP DRY,  KEEP AWAY FROM SUNLIGHT,  PROTECT FROM HEAT AND RADIOACTIVE SOURCES.]: Brand: PNEUMOSURE

## (undated) DEVICE — PVC URETHRAL CATHETER: Brand: DOVER

## (undated) DEVICE — GLOVE PI ULTRA TOUCH SZ.6.5